# Patient Record
Sex: FEMALE | Race: ASIAN | NOT HISPANIC OR LATINO | Employment: UNEMPLOYED | URBAN - METROPOLITAN AREA
[De-identification: names, ages, dates, MRNs, and addresses within clinical notes are randomized per-mention and may not be internally consistent; named-entity substitution may affect disease eponyms.]

---

## 2017-01-18 ENCOUNTER — OFFICE VISIT (OUTPATIENT)
Dept: FAMILY MEDICINE | Facility: CLINIC | Age: 62
End: 2017-01-18

## 2017-01-18 VITALS
BODY MASS INDEX: 34.42 KG/M2 | DIASTOLIC BLOOD PRESSURE: 72 MMHG | RESPIRATION RATE: 16 BRPM | WEIGHT: 153 LBS | HEART RATE: 70 BPM | HEIGHT: 56 IN | TEMPERATURE: 97.6 F | OXYGEN SATURATION: 98 % | SYSTOLIC BLOOD PRESSURE: 128 MMHG

## 2017-01-18 DIAGNOSIS — E11.42 TYPE 2 DIABETES MELLITUS WITH DIABETIC POLYNEUROPATHY, WITH LONG-TERM CURRENT USE OF INSULIN (H): Primary | ICD-10-CM

## 2017-01-18 DIAGNOSIS — Z79.4 TYPE 2 DIABETES MELLITUS WITH DIABETIC POLYNEUROPATHY, WITH LONG-TERM CURRENT USE OF INSULIN (H): Primary | ICD-10-CM

## 2017-01-18 LAB
CREAT UR-MCNC: 52.4 MG/DL
MICROALBUMIN UR-MCNC: <0.5 MG/DL (ref 0–1.99)
MICROALBUMIN/CREAT UR: NORMAL MG/G

## 2017-01-18 NOTE — NURSING NOTE
name: Naty Santos  Language: Hmong  Agency: [facility]  Phone number: [phone]

## 2017-01-19 LAB
BUN SERPL-MCNC: 52 MG/DL (ref 7–30)
CALCIUM SERPL-MCNC: 9.3 MG/DL (ref 8.5–10.4)
CHLORIDE SERPLBLD-SCNC: 109 MMOL/L (ref 94–109)
CO2 SERPL-SCNC: 23 MMOL/L (ref 20–32)
CREAT SERPL-MCNC: 1.7 MG/DL (ref 0.6–1.3)
EGFR CALCULATED (BLACK REFERENCE): 39.3 ML/MIN
EGFR CALCULATED (NON BLACK REFERENCE): 32.5 ML/MIN
GLUCOSE SERPL-MCNC: 147 MG/DL (ref 60–109)
POTASSIUM SERPL-SCNC: 4.7 MMOL/L (ref 3.4–5.3)
SODIUM SERPL-SCNC: 140 MMOL/L (ref 133–144)

## 2017-01-19 NOTE — PROGRESS NOTES
Quick Note:    Call patient:    Kidney test does show that the new pill (invokana) is causing some decline in kidney function. I don't like this kidney response and therefore want you to stop taking the invokana pill. When you are no longer using that pill, you will need to go back up on your insulin: I think you could return to 70 units in AM and 50 units in PM. We will recheck your kidney and A1c blood work the last week of February.  ______

## 2017-01-20 NOTE — PROGRESS NOTES
Quick Note:    Called pt with results. Informed not to take the Invokana and to go back on insulin as mentioned in message from provider.   ---SANDY Balderas.     ______

## 2017-03-08 ENCOUNTER — OFFICE VISIT (OUTPATIENT)
Dept: FAMILY MEDICINE | Facility: CLINIC | Age: 62
End: 2017-03-08

## 2017-03-08 VITALS
RESPIRATION RATE: 16 BRPM | HEIGHT: 56 IN | BODY MASS INDEX: 34.42 KG/M2 | DIASTOLIC BLOOD PRESSURE: 68 MMHG | HEART RATE: 77 BPM | OXYGEN SATURATION: 100 % | SYSTOLIC BLOOD PRESSURE: 120 MMHG | TEMPERATURE: 97.4 F | WEIGHT: 153 LBS

## 2017-03-08 DIAGNOSIS — Z11.59 NEED FOR HEPATITIS C SCREENING TEST: ICD-10-CM

## 2017-03-08 DIAGNOSIS — Z79.4 TYPE 2 DIABETES MELLITUS WITH DIABETIC POLYNEUROPATHY, WITH LONG-TERM CURRENT USE OF INSULIN (H): Primary | ICD-10-CM

## 2017-03-08 DIAGNOSIS — E11.42 TYPE 2 DIABETES MELLITUS WITH DIABETIC POLYNEUROPATHY, WITH LONG-TERM CURRENT USE OF INSULIN (H): Primary | ICD-10-CM

## 2017-03-08 DIAGNOSIS — Z12.39 BREAST CANCER SCREENING: ICD-10-CM

## 2017-03-08 DIAGNOSIS — Z12.11 COLON CANCER SCREENING: ICD-10-CM

## 2017-03-08 LAB
ANION GAP SERPL CALCULATED.3IONS-SCNC: 9 MMOL/L (ref 5–18)
BUN SERPL-MCNC: 35 MG/DL (ref 8–22)
CALCIUM SERPL-MCNC: 9.8 MG/DL (ref 8.5–10.5)
CHLORIDE SERPL-SCNC: 109 MMOL/L (ref 98–107)
CO2 SERPL-SCNC: 21 MMOL/L (ref 22–31)
CREAT SERPL-MCNC: 1.19 MG/DL (ref 0.6–1.1)
GLUCOSE SERPL-MCNC: 76 MG/DL (ref 70–125)
HBA1C MFR BLD: 9.4 % (ref 4.1–5.7)
POTASSIUM SERPL-SCNC: 4.3 MMOL/L (ref 3.5–5)
SODIUM SERPL-SCNC: 139 MMOL/L (ref 136–145)

## 2017-03-08 RX ORDER — GLIPIZIDE 5 MG/1
15 TABLET ORAL
Qty: 270 TABLET | Refills: 3 | Status: SHIPPED | OUTPATIENT
Start: 2017-03-08 | End: 2017-07-19

## 2017-03-08 NOTE — PROGRESS NOTES
SUBJECTIVE:  Omid Adorno is a 60 year old who presents today for follow up of DIABETES MELLITUS     1.  Diabetes:  Victoza daily, lantus 70 AM 54 PM, and glipizide 10 in the AM,  Blood glucose averages:132 in AM, 107 and 191 in evening  Symptoms of low blood sugar (hypoglycemia:sweating, shaky, weak, dizzy, blurred vision, confusion)? Denies    Problems taking medications regularly? none  Side effects? None    Health maintenance reviewed and appropriate orders placed?  Yes      BP Readings from Last 3 Encounters:   03/08/17 120/68   01/18/17 128/72   11/30/16 138/75     Lab Results   Component Value Date    A1C 9.4 03/08/2017    A1C 10.3 11/30/2016    A1C 9.2 09/13/2016    A1C 9.4 06/01/2016    A1C 10.8 03/18/2016           Recent Labs   Lab Test  05/08/15   0847  04/28/14   0829   CHOL  119.0  147.0   HDL  40.0*  47.0*   LDL  62  65.0  82.0   TRIG  74.0  88.0   CHOLHDLRATIO  3.0  3.1       Wt Readings from Last 3 Encounters:   03/08/17 153 lb (69.4 kg)   01/18/17 153 lb (69.4 kg)   11/30/16 154 lb 12.8 oz (70.2 kg)       Current Outpatient Prescriptions   Medication Sig Dispense Refill     insulin glargine (LANTUS) 100 UNIT/ML injection 64 units SQ in AM 50 units SQ in PM 30 mL 11     aspirin 81 MG tablet Take 1 tablet (81 mg) by mouth daily 90 tablet 3     atorvastatin (LIPITOR) 40 MG tablet Take 1 tablet (40 mg) by mouth daily 90 tablet 3     glipiZIDE (GLUCOTROL) 10 MG tablet Take 1 tablet (10 mg) by mouth daily (before lunch) 90 tablet 3     liraglutide (VICTOZA) 18 MG/3ML soln Inject 1.8 mg Subcutaneous daily 3 Month 1     lisinopril-hydrochlorothiazide (PRINZIDE,ZESTORETIC) 20-12.5 MG per tablet Take 2 tablets by mouth daily 180 tablet 3     canagliflozin (INVOKANA) 100 MG tablet Take 1 tablet (100 mg) by mouth every morning (before breakfast) 30 tablet 3     latanoprost (XALATAN) 0.005 % ophthalmic solution Apply 1 drop to eye daily Instill  1 drop into the affected eye(s) once daily as directed 1 Bottle 3  "    acetaminophen (TYLENOL) 325 MG tablet Take 1-2 tablets (325-650 mg) by mouth every 4 hours as needed for mild pain 100 tablet 3     ammonium lactate (AMLACTIN) 12 % cream Apply to feet twice daily 420 g 4     blood glucose monitoring (KEYSHA CONTOUR) test strip Check sugars 2 times daily 200 each 3     blood glucose monitoring (KEYSHA MICROLET) lancets Check sugars twice daily 200 Box 3     capsaicin (ZOSTRIX) 0.025 % CREA Apply twice daily to affected skin 180 g 10     cholecalciferol (VITAMIN D) 1000 UNIT tablet Take 1 tablet (1,000 Units) by mouth daily 100 tablet 3     insulin pen needle 31G X 8 MM Use for insulin injections twice daily 200 each 3     loratadine (CLARITIN) 10 MG tablet Take 1 tablet (10 mg) by mouth daily as needed for allergies 30 tablet 10     Alcohol Swabs PADS Use as directed twice daily 100 each 1     Blood Glucose Monitoring Suppl ([employer] CONTOUR MONITOR) W/DEVICE KIT Check fasting glucose 2 day on insulin 1 kit 3       Histories reviewed and updated in Epic.      ROS:  C: NEGATIVE for fatigue, unexpected change in weight  R: NEGATIVE for significant cough or shortness of breath  CV: NEGATIVE for chest pain, palpitations or new or worsening peripheral edema  HEME/ALLERGY/IMMUNE: no known seasonal allergies  P: NEGATIVE for changes in mood or affect    EXAM:  Vitals: /68  Pulse 77  Temp 97.4  F (36.3  C) (Oral)  Resp 16  Ht 4' 7.5\" (141 cm)  Wt 153 lb (69.4 kg)  SpO2 100%  BMI 34.92 kg/m2  BMIE= Body mass index is 34.92 kg/(m^2).  GENERAL APPEARANCE: alert and no acute distress  RESP: lungs clear to auscultation - no rales, rhonchi or wheezes  CV: regular rate and rhythm, normal S1 S2, no S3 or S4 and no murmur, click or rub -    ASSESSMENT AND PLAN:  (E11.42,  Z79.4) Type 2 diabetes mellitus with diabetic polyneuropathy, with long-term current use of insulin (H)  (primary encounter diagnosis)  Comment: improving again  Plan: Hemoglobin A1c ([facility]), glipiZIDE (GLUCOTROL)         " 5 MG tablet, Basic Metabolic Profile         ([employer]), CANCELED: Basic Metabolic Panel         (LabDAQ)        Only change will be with glipizide to now take 1.5 tabs daily with supper meal    (Z11.59) Need for hepatitis C screening test  Comment: agrees, low risk  Plan: Hepatitis C Antibody ([facility])            (Z12.11) Colon cancer screening  Comment: agrees, asymptomatic  Plan: Fecal Occult Blood - FIT, iFOB ([facility])            (Z12.39) Breast cancer screening  Comment: agrees  Plan: MA SCREENING DIGITAL BILAT

## 2017-03-08 NOTE — MR AVS SNAPSHOT
After Visit Summary   3/8/2017    Omid Adorno    MRN: [id number]           Patient Information     Date Of Birth          1955        Visit Information        Provider Department      3/8/2017 4:20 PM Shannan Benson MD [facility]        Today's Diagnoses     Type 2 diabetes mellitus with diabetic polyneuropathy, with long-term current use of insulin (H)    -  1    Need for hepatitis C screening test        Colon cancer screening        Breast cancer screening          Care Instructions    What: Mammogram   Where: [facility]   When:  4:30pm Thursday March 16th 2017  Telephone: [phone]   Fax: [phone]  Any additional comments: No soaps, powders, deodorants, or scented items.  Please arrive at least 10-15 minutes early.   Scheduled by: Detsin José CMA    Increase Glipizide  ~A new script sent to pharmacy for the Glipizide 5mg (take 3 daily to equal a total of 15mg daily).  If you want to use up the Glipizide you have now, you can take 1 and half tablet (Total of 15mg)    ~Follow up 3 months    Your medication list is printed, please keep this with you, it is helpful to bring this current list to any other medical appointments, the emergency room or hospital.    If you had lab testing today and your results are reassuring or normal they will be be mailed to you within 7 days.     If the lab tests need quick action we will call you with the results.   The phone number we will call with results is # [phone] (home) . If this is not the best number please call our clinic and change the number.    If you need any refills please call your pharmacy and they will contact us.    If you have any further concerns or wish to schedule another appointment you must call our office during normal business hours  [phone] (8-5:00 M-F)  If you have urgent medical questions that cannot wait  you may also call [phone] at any time of day.  If you have a  "medical emergency please call 911.    Thank you for coming to [facility].          Follow-ups after your visit        Future tests that were ordered for you today     Open Future Orders        Priority Expected Expires Ordered    Fecal Occult Blood - FIT, iFOB ([facility]) Routine  12/15/2017 3/8/2017            Who to contact     Please call your clinic at [phone] to:    Ask questions about your health    Make or cancel appointments    Discuss your medicines    Learn about your test results    Speak to your doctor   If you have compliments or concerns about an experience at your clinic, or if you wish to file a complaint, please contact [facility] Patient Relations at [phone] or email us at [email or web address]         Additional Information About Your Visit        Care EveryWhere ID     This is your Care EveryWhere ID. This could be used by other organizations to access your [place] medical records  [id number]        Your Vitals Were     Pulse Temperature Respirations Height Pulse Oximetry BMI (Body Mass Index)    77 97.4  F (36.3  C) (Oral) 16 4' 7.5\" (141 cm) 100% 34.92 kg/m2       Blood Pressure from Last 3 Encounters:   03/08/17 120/68   01/18/17 128/72   11/30/16 138/75    Weight from Last 3 Encounters:   03/08/17 153 lb (69.4 kg)   01/18/17 153 lb (69.4 kg)   11/30/16 154 lb 12.8 oz (70.2 kg)              We Performed the Following     Basic Metabolic Panel ([facility])     Hemoglobin A1c ([facility])     Hepatitis C Antibody ([facility])     MA SCREENING DIGITAL BILAT          Today's Medication Changes          These changes are accurate as of: 3/8/17  5:15 PM.  If you have any questions, ask your nurse or doctor.               These medicines have changed or have updated prescriptions.        Dose/Directions    glipiZIDE 5 MG tablet   Commonly known as:  GLUCOTROL   This may have changed:    - medication strength  - how much to take   Used for:  Type 2 " diabetes mellitus with diabetic polyneuropathy, with long-term current use of insulin (H)   Changed by:  Shannan Benson MD        Dose:  15 mg   Take 3 tablets (15 mg) by mouth daily (before lunch)   Quantity:  270 tablet   Refills:  3         Stop taking these medicines if you haven't already. Please contact your care team if you have questions.     canagliflozin 100 MG tablet   Commonly known as:  INVOKANA   Stopped by:  Shannan Benson MD                Where to get your medicines      These medications were sent to [facility]pharm[facility] #[id number] - [place], [place] - [address]  [address], [place] [address]    Hours:  24-hours Phone:  [phone]     glipiZIDE 5 MG tablet                Primary Care Provider Office Phone # Fax #    Shannan Benson [phone]       [facility] [address] [facility] [address]        Thank you!     Thank you for choosing [facility]  for your care. Our goal is always to provide you with excellent care. Hearing back from our patients is one way we can continue to improve our services. Please take a few minutes to complete the written survey that you may receive in the mail after your visit with us. Thank you!             Your Updated Medication List - Protect others around you: Learn how to safely use, store and throw away your medicines at www.disposemymeds.org.          This list is accurate as of: 3/8/17  5:15 PM.  Always use your most recent med list.                   Brand Name Dispense Instructions for use    acetaminophen 325 MG tablet    TYLENOL    100 tablet    Take 1-2 tablets (325-650 mg) by mouth every 4 hours as needed for mild pain       Alcohol Swabs Pads     100 each    Use as directed twice daily       ammonium lactate 12 % cream    AMLACTIN    420 g    Apply to feet twice daily       aspirin 81 MG tablet     90 tablet    Take 1 tablet (81 mg) by mouth daily       atorvastatin 40 MG tablet     LIPITOR    90 tablet    Take 1 tablet (40 mg) by mouth daily       blood glucose monitoring lancets     200 Box    Check sugars twice daily       blood glucose monitoring meter device kit     1 kit    Check fasting glucose 2 day on insulin       blood glucose monitoring test strip    KEYSHA CONTOUR    200 each    Check sugars 2 times daily       capsaicin 0.025 % Crea cream    ZOSTRIX    180 g    Apply twice daily to affected skin       cholecalciferol 1000 UNIT tablet    vitamin D    100 tablet    Take 1 tablet (1,000 Units) by mouth daily       glipiZIDE 5 MG tablet    GLUCOTROL    270 tablet    Take 3 tablets (15 mg) by mouth daily (before lunch)       insulin glargine 100 UNIT/ML injection    LANTUS    30 mL    70 units SQ in AM 50 units SQ in PM       insulin pen needle 31G X 8 MM     200 each    Use for insulin injections twice daily       latanoprost 0.005 % ophthalmic solution    XALATAN    1 Bottle    Apply 1 drop to eye daily Instill  1 drop into the affected eye(s) once daily as directed       liraglutide 18 MG/3ML soln    VICTOZA    3 Month    Inject 1.8 mg Subcutaneous daily       lisinopril-hydrochlorothiazide 20-12.5 MG per tablet    PRINZIDE/ZESTORETIC    180 tablet    Take 2 tablets by mouth daily       loratadine 10 MG tablet    CLARITIN    30 tablet    Take 1 tablet (10 mg) by mouth daily as needed for allergies

## 2017-03-08 NOTE — PATIENT INSTRUCTIONS
What: Mammogram   Where: [facility]   When:  4:30pm Thursday March 16th 2017  Telephone: [phone]   Fax: [phone]  Any additional comments: No soaps, powders, deodorants, or scented items.  Please arrive at least 10-15 minutes early.   Scheduled by: Destin José CMA    Increase Glipizide  ~A new script sent to pharmacy for the Glipizide 5mg (take 3 daily to equal a total of 15mg daily).  If you want to use up the Glipizide you have now, you can take 1 and half tablet (Total of 15mg)    ~Follow up 3 months    Your medication list is printed, please keep this with you, it is helpful to bring this current list to any other medical appointments, the emergency room or hospital.    If you had lab testing today and your results are reassuring or normal they will be be mailed to you within 7 days.     If the lab tests need quick action we will call you with the results.   The phone number we will call with results is # [phone] (home) . If this is not the best number please call our clinic and change the number.    If you need any refills please call your pharmacy and they will contact us.    If you have any further concerns or wish to schedule another appointment you must call our office during normal business hours  [phone] (8-5:00 M-F)  If you have urgent medical questions that cannot wait  you may also call [phone] at any time of day.  If you have a medical emergency please call [phone].    Thank you for coming to [facility].

## 2017-03-09 LAB — HCV AB SER QL: NEGATIVE

## 2017-03-09 ASSESSMENT — PATIENT HEALTH QUESTIONNAIRE - PHQ9: SUM OF ALL RESPONSES TO PHQ QUESTIONS 1-9: 9

## 2017-03-09 NOTE — PROGRESS NOTES
Call patient:    Kidneys better since stopping invokanna medicine.  We will see you back in mid May for recheck.  Only medicine change is glipizide now 15 mg daily

## 2017-03-14 DIAGNOSIS — E11.42 TYPE 2 DIABETES MELLITUS WITH DIABETIC POLYNEUROPATHY, WITH LONG-TERM CURRENT USE OF INSULIN (H): Primary | ICD-10-CM

## 2017-03-14 DIAGNOSIS — H40.9 GLAUCOMA, UNSPECIFIED GLAUCOMA, UNSPECIFIED LATERALITY: ICD-10-CM

## 2017-03-14 DIAGNOSIS — Z79.4 TYPE 2 DIABETES MELLITUS WITH DIABETIC POLYNEUROPATHY, WITH LONG-TERM CURRENT USE OF INSULIN (H): Primary | ICD-10-CM

## 2017-03-14 RX ORDER — LATANOPROST 50 UG/ML
1 SOLUTION/ DROPS OPHTHALMIC DAILY
Qty: 2.5 ML | Refills: 6 | Status: SHIPPED | OUTPATIENT
Start: 2017-03-14 | End: 2017-10-26

## 2017-03-14 RX ORDER — LIRAGLUTIDE 6 MG/ML
1.8 INJECTION SUBCUTANEOUS DAILY
Qty: 27 ML | Refills: 3 | Status: SHIPPED | OUTPATIENT
Start: 2017-03-14 | End: 2017-10-25

## 2017-03-16 ENCOUNTER — HOSPITAL ENCOUNTER (OUTPATIENT)
Dept: MAMMOGRAPHY | Facility: HOSPITAL | Age: 62
Discharge: HOME | End: 2017-03-16
Attending: FAMILY MEDICINE

## 2017-03-16 DIAGNOSIS — Z12.31 VISIT FOR SCREENING MAMMOGRAM: ICD-10-CM

## 2017-03-16 LAB — MAMMOGRAM: NORMAL

## 2017-05-03 DIAGNOSIS — L84 CALLUS OF FOOT: ICD-10-CM

## 2017-05-03 RX ORDER — AMMONIUM LACTATE 12 G/100G
CREAM TOPICAL
Qty: 420 G | Refills: 3 | Status: SHIPPED | OUTPATIENT
Start: 2017-05-03 | End: 2017-12-20

## 2017-05-09 ENCOUNTER — TRANSFERRED RECORDS (OUTPATIENT)
Dept: HEALTH INFORMATION MANAGEMENT | Facility: CLINIC | Age: 62
End: 2017-05-09

## 2017-05-09 ENCOUNTER — OFFICE VISIT (OUTPATIENT)
Dept: FAMILY MEDICINE | Facility: CLINIC | Age: 62
End: 2017-05-09

## 2017-05-09 VITALS
BODY MASS INDEX: 35.68 KG/M2 | HEIGHT: 55 IN | SYSTOLIC BLOOD PRESSURE: 130 MMHG | HEART RATE: 69 BPM | DIASTOLIC BLOOD PRESSURE: 79 MMHG | TEMPERATURE: 97.7 F | WEIGHT: 154.2 LBS | OXYGEN SATURATION: 99 %

## 2017-05-09 DIAGNOSIS — M72.2 PLANTAR FASCIITIS: ICD-10-CM

## 2017-05-09 DIAGNOSIS — M79.671 RIGHT FOOT PAIN: Primary | ICD-10-CM

## 2017-05-09 NOTE — PATIENT INSTRUCTIONS
Your medication list is printed, please keep this with you, it is helpful to bring this current list to any other medical appointments, the emergency room or hospital.    If you had lab testing today and your results are reassuring or normal they will be be mailed to you within 7 days.     If the lab tests need quick action we will call you with the results.   The phone number we will call with results is # [phone] (home) . If this is not the best number please call our clinic and change the number.    If you need any refills please call your pharmacy and they will contact us.    If you have any further concerns or wish to schedule another appointment you must call our office during normal business hours  [phone] (8-5:00 M-F)  If you have urgent medical questions that cannot wait  you may also call [phone] at any time of day.  If you have a medical emergency please call [phone].    Thank you for coming to [facility].    Referral for Physical Therapy faxed to [facility] and they will contact pt to schedule appointment.

## 2017-05-09 NOTE — PROGRESS NOTES
"SUBJECTIVE:  1. Right heel pain   -since nails in sole of feet 9/2016 one area seemed to heal well, another area seemed to always be tender, now gotten worse  -pain around the heel with weight bearing, no pain when just sitting/laying  -denies other trauma and has not tried anything for pain    ROS: denies fevers/chills/skin changes    ED visit and initial injury records reviewed    OBJECTIVE:  /79 (BP Location: Right arm, Patient Position: Chair, Cuff Size: Adult Large)  Pulse 69  Temp 97.7  F (36.5  C) (Oral)  Ht 4' 7.25\" (140.3 cm)  Wt 154 lb 3.2 oz (69.9 kg)  SpO2 99%  BMI 35.52 kg/m2  Gen: alert, oriented X 3, no acute distress, no sign of discomfort  HEEL: relatively non-tender over lateral heel, mild tender over plantar insertion, no obvious skin changes/lumps suggesting retained fb    XRAY: negative for retained metallic FB, no spur, nl bony structures    ASSESSMENT/PLAN:  (M79.671) Right foot pain  (primary encounter diagnosis)  Comment: evaluate for any FB from retained nail  Plan: XR FOOT RT G/E 3 VW        Reassured about puncture    (M72.2) Plantar fasciitis  Comment: exam and story consistent  Plan: PHYSICAL THERAPY REFERRAL        Discussed use of ibuprofen/tylenol/ice massage and PT      "

## 2017-05-09 NOTE — MR AVS SNAPSHOT
After Visit Summary   5/9/2017    Omid Adorno    MRN: [id number]           Patient Information     Date Of Birth          1955        Visit Information        Provider Department      5/9/2017 4:20 PM Shannan eBnson MD [facility]        Today's Diagnoses     Right foot pain    -  1    Plantar fasciitis          Care Instructions          Your medication list is printed, please keep this with you, it is helpful to bring this current list to any other medical appointments, the emergency room or hospital.    If you had lab testing today and your results are reassuring or normal they will be be mailed to you within 7 days.     If the lab tests need quick action we will call you with the results.   The phone number we will call with results is # [phone] (home) . If this is not the best number please call our clinic and change the number.    If you need any refills please call your pharmacy and they will contact us.    If you have any further concerns or wish to schedule another appointment you must call our office during normal business hours  [phone] (8-5:00 M-F)  If you have urgent medical questions that cannot wait  you may also call [phone] at any time of day.  If you have a medical emergency please call [phone].    Thank you for coming to [facility].    Referral for Physical Therapy faxed to [facility] and they will contact pt to schedule appointment.         Follow-ups after your visit        Additional Services     PHYSICAL THERAPY REFERRAL       PT/OT  Facility:   [facility], P: [phone], F: [phone]    History: right heel pain, has been mild pain and acute flare    Precautions/Contraindications: None    Imaging Studies: X-Ray    Surgical Procedure/Test Results: None    Treatment Goals:   Decrease: Pain    Prognosis: good    Visits: Up to 12    Evaluate: Evaluate and treat    Plan: Manual Therapy                 "  Follow-up notes from your care team     Return in about 4 weeks (around 2017).      Who to contact     Please call your clinic at [phone] to:    Ask questions about your health    Make or cancel appointments    Discuss your medicines    Learn about your test results    Speak to your doctor   If you have compliments or concerns about an experience at your clinic, or if you wish to file a complaint, please contact [facility] Patient Relations at [phone] or email us at [email or web address]         Additional Information About Your Visit        [employer] Information     [employer] is an electronic gateway that provides easy, online access to your medical records. With [employer], you can request a clinic appointment, read your test results, renew a prescription or communicate with your care team.     To sign up for [employer] visit the website at [email or web address]   You will be asked to enter the access code listed below, as well as some personal information. Please follow the directions to create your username and password.     Your access code is: [email or web address]  Expires: 2017 11:19 AM     Your access code will  in 90 days. If you need help or a new code, please contact your [facility] or call [phone] for assistance.        Care EveryWhere ID     This is your Care EveryWhere ID. This could be used by other organizations to access your [place] medical records  [id number]        Your Vitals Were     Pulse Temperature Height Pulse Oximetry BMI (Body Mass Index)       69 97.7  F (36.5  C) (Oral) 4' 7.25\" (140.3 cm) 99% 35.52 kg/m2        Blood Pressure from Last 3 Encounters:   17 130/79   17 120/68   17 128/72    Weight from Last 3 Encounters:   17 154 lb 3.2 oz (69.9 kg)   17 153 lb (69.4 kg)   17 153 lb (69.4 kg)              We Performed the Following     XR FOOT RT G/E 3 VW        " Primary Care Provider Office Phone # Fax #    Shannan Patrick Benson [phone]       [facility] [address] [place] [address]        Thank you!     Thank you for choosing [facility]  for your care. Our goal is always to provide you with excellent care. Hearing back from our patients is one way we can continue to improve our services. Please take a few minutes to complete the written survey that you may receive in the mail after your visit with us. Thank you!             Your Updated Medication List - Protect others around you: Learn how to safely use, store and throw away your medicines at www.disposemymeds.org.          This list is accurate as of: 5/9/17 11:59 PM.  Always use your most recent med list.                   Brand Name Dispense Instructions for use    acetaminophen 325 MG tablet    TYLENOL    100 tablet    Take 1-2 tablets (325-650 mg) by mouth every 4 hours as needed for mild pain       Alcohol Swabs Pads     100 each    Use as directed twice daily       ammonium lactate 12 % cream    AMLACTIN    420 g    Apply to feet twice daily       aspirin 81 MG tablet     90 tablet    Take 1 tablet (81 mg) by mouth daily       atorvastatin 40 MG tablet    LIPITOR    90 tablet    Take 1 tablet (40 mg) by mouth daily       blood glucose monitoring lancets     200 Box    Check sugars twice daily       blood glucose monitoring meter device kit     1 kit    Check fasting glucose 2 day on insulin       blood glucose monitoring test strip    KEYSHA CONTOUR    200 each    Check sugars 2 times daily       capsaicin 0.025 % Crea cream    ZOSTRIX    180 g    Apply twice daily to affected skin       cholecalciferol 1000 UNIT tablet    vitamin D    100 tablet    Take 1 tablet (1,000 Units) by mouth daily       glipiZIDE 5 MG tablet    GLUCOTROL    270 tablet    Take 3 tablets (15 mg) by mouth daily (before lunch)       insulin glargine 100 UNIT/ML injection    LANTUS    30 mL    70  units SQ in AM 50 units SQ in PM       insulin pen needle 31G X 8 MM     200 each    Use for insulin injections twice daily       latanoprost 0.005 % ophthalmic solution    XALATAN    2.5 mL    Apply 1 drop to eye daily Instill  1 drop into the affected eye(s) once daily as directed       liraglutide 18 MG/3ML soln    VICTOZA    27 mL    Inject 1.8 mg Subcutaneous daily       lisinopril-hydrochlorothiazide 20-12.5 MG per tablet    PRINZIDE/ZESTORETIC    180 tablet    Take 2 tablets by mouth daily       loratadine 10 MG tablet    CLARITIN    30 tablet    Take 1 tablet (10 mg) by mouth daily as needed for allergies

## 2017-05-10 ENCOUNTER — AMBULATORY - HEALTHEAST (OUTPATIENT)
Dept: ADMINISTRATIVE | Facility: REHABILITATION | Age: 62
End: 2017-05-10

## 2017-05-10 DIAGNOSIS — M72.2 PLANTAR FASCIITIS: ICD-10-CM

## 2017-05-18 ENCOUNTER — OFFICE VISIT - HEALTHEAST (OUTPATIENT)
Dept: PHYSICAL THERAPY | Facility: REHABILITATION | Age: 62
End: 2017-05-18

## 2017-05-18 DIAGNOSIS — M72.2 PLANTAR FASCIITIS: ICD-10-CM

## 2017-05-18 DIAGNOSIS — M79.671 RIGHT FOOT PAIN: ICD-10-CM

## 2017-05-18 DIAGNOSIS — M25.673 DECREASED ROM OF ANKLE: ICD-10-CM

## 2017-05-25 ENCOUNTER — OFFICE VISIT - HEALTHEAST (OUTPATIENT)
Dept: PHYSICAL THERAPY | Facility: REHABILITATION | Age: 62
End: 2017-05-25

## 2017-05-25 DIAGNOSIS — M25.673 DECREASED ROM OF ANKLE: ICD-10-CM

## 2017-05-25 DIAGNOSIS — M72.2 PLANTAR FASCIITIS: ICD-10-CM

## 2017-05-25 DIAGNOSIS — M79.671 RIGHT FOOT PAIN: ICD-10-CM

## 2017-06-01 ENCOUNTER — OFFICE VISIT - HEALTHEAST (OUTPATIENT)
Dept: PHYSICAL THERAPY | Facility: REHABILITATION | Age: 62
End: 2017-06-01

## 2017-06-01 DIAGNOSIS — M79.671 RIGHT FOOT PAIN: ICD-10-CM

## 2017-06-01 DIAGNOSIS — M25.673 DECREASED ROM OF ANKLE: ICD-10-CM

## 2017-06-01 DIAGNOSIS — M72.2 PLANTAR FASCIITIS: ICD-10-CM

## 2017-06-08 ENCOUNTER — OFFICE VISIT - HEALTHEAST (OUTPATIENT)
Dept: PHYSICAL THERAPY | Facility: REHABILITATION | Age: 62
End: 2017-06-08

## 2017-06-08 DIAGNOSIS — M79.671 RIGHT FOOT PAIN: ICD-10-CM

## 2017-06-08 DIAGNOSIS — M25.673 DECREASED ROM OF ANKLE: ICD-10-CM

## 2017-06-08 DIAGNOSIS — M72.2 PLANTAR FASCIITIS: ICD-10-CM

## 2017-06-12 DIAGNOSIS — E11.43 TYPE 2 DIABETES MELLITUS WITH DIABETIC AUTONOMIC NEUROPATHY, WITH LONG-TERM CURRENT USE OF INSULIN (H): ICD-10-CM

## 2017-06-12 DIAGNOSIS — Z79.4 TYPE 2 DIABETES MELLITUS WITH DIABETIC AUTONOMIC NEUROPATHY, WITH LONG-TERM CURRENT USE OF INSULIN (H): ICD-10-CM

## 2017-06-12 NOTE — TELEPHONE ENCOUNTER
[facility] phone call message- medication clarification/question:    Full Medication Name: Insulin glargin (lantus) 100 unit/mL injection   Strength:     Have you contacted your pharmacy about this refill request? Yes    If  Yes,  which pharmacy? [facility]    When did you contact the pharmacy? Just right before she called the clinic    Additional comments/concerns from call to pharmacy: The Pharmacy said they will send a fax to the clinic but told her to contact the clinic as well since she will be out.    Reason for call to clinic: Patient is calling for refills, she states she forgot to check and now she is going to run out so she would need lantus above refill tonight or tomorrow because for tomorrow she will not have any insulin. Told her that the clinic would contact her or the pharmacy will to let her know about her Rx if ready. Please call and advise.       Pharmacy confirmed as [id number] - [place], [place] - [address]: Yes    OK to leave a message on voice mail?     Primary language: Hmong      needed? Yes    Call taken on June 12, 2017 at 3:47 PM by Sherwin Huff

## 2017-06-13 NOTE — TELEPHONE ENCOUNTER
Called and spoke with patient and did informed patient that the Lantus still has 6 refill left and patient should call the pharmacy and patient stated that she request refill of pen needle not lantus.

## 2017-06-14 NOTE — TELEPHONE ENCOUNTER
Patient's primary number is not a working number. Called emergency contact number and left message with relative. Informed relative to have patient call clinic to schedule apt for some time in July.

## 2017-06-15 ENCOUNTER — OFFICE VISIT - HEALTHEAST (OUTPATIENT)
Dept: PHYSICAL THERAPY | Facility: REHABILITATION | Age: 62
End: 2017-06-15

## 2017-06-15 DIAGNOSIS — M79.671 RIGHT FOOT PAIN: ICD-10-CM

## 2017-06-15 DIAGNOSIS — M25.673 DECREASED ROM OF ANKLE: ICD-10-CM

## 2017-06-15 DIAGNOSIS — M72.2 PLANTAR FASCIITIS: ICD-10-CM

## 2017-07-05 ENCOUNTER — OFFICE VISIT (OUTPATIENT)
Dept: FAMILY MEDICINE | Facility: CLINIC | Age: 62
End: 2017-07-05

## 2017-07-05 VITALS
WEIGHT: 153.4 LBS | HEART RATE: 69 BPM | DIASTOLIC BLOOD PRESSURE: 65 MMHG | OXYGEN SATURATION: 100 % | BODY MASS INDEX: 35.33 KG/M2 | SYSTOLIC BLOOD PRESSURE: 109 MMHG | RESPIRATION RATE: 16 BRPM | TEMPERATURE: 97.7 F

## 2017-07-05 DIAGNOSIS — J06.9 VIRAL UPPER RESPIRATORY TRACT INFECTION: Primary | ICD-10-CM

## 2017-07-05 RX ORDER — GUAIFENESIN 200 MG/1
400 TABLET ORAL EVERY 4 HOURS PRN
Qty: 60 TABLET | Refills: 0 | Status: SHIPPED | OUTPATIENT
Start: 2017-07-05 | End: 2017-07-12

## 2017-07-05 RX ORDER — BENZONATATE 200 MG/1
200 CAPSULE ORAL 3 TIMES DAILY PRN
Qty: 42 CAPSULE | Refills: 0 | Status: SHIPPED | OUTPATIENT
Start: 2017-07-05 | End: 2022-08-04

## 2017-07-05 NOTE — PATIENT INSTRUCTIONS
For your cough:     Start Benzonatate 200 mg capsule - 1 capsule by mouth 3 times a day.    If you develop fever or short of breath call the clinic.    Your medication list is printed, please keep this with you, it is helpful to bring this current list to any other medical appointments, the emergency room or hospital.    If you had lab testing today and your results are reassuring or normal they will be be mailed to you within 7 days.     If the lab tests need quick action we will call you with the results.   The phone number we will call with results is # [phone] (home) . If this is not the best number please call our clinic and change the number.    If you need any refills please call your pharmacy and they will contact us.    If you have any further concerns or wish to schedule another appointment you must call our office during normal business hours  [phone] (8-5:00 M-F)  If you have urgent medical questions that cannot wait  you may also call [phone] at any time of day.  If you have a medical emergency please call [phone].    Thank you for coming to [facility].

## 2017-07-05 NOTE — PROGRESS NOTES
HPI:       Omid Adorno is a 61 year old  female with a significant past medical history of diabetes who presents for the new concern(s) of    1.  Cough  Patient presents with:  Cough: x 3 weeks, cough more per pt. Has not tried or taken any meds for this.  med review: pt did not bring in medication bottles.   coughing all night and now feeling a headache and sore throat  Slight mucous this morning,  Unaware of contagious contacts  -slight postnasal drip      A [employer]  was used for this visit         PMHX:     Patient Active Problem List   Diagnosis     Health Care Home     Hepatitis B carrier (H)     Chronic kidney disease, stage III (moderate)     Pure hypercholesterolemia     Essential hypertension     Obesity     Disorder of bursae and tendons in shoulder region     Vitamin D deficiency     Major depressive disorder, recurrent episode, moderate (H)     Dyspepsia     Callus of foot     Glaucoma     Other lymphedema     Rheumatoid arthritis involving both hands with positive rheumatoid factor (H)     Type 2 diabetes mellitus with diabetic polyneuropathy, with long-term current use of insulin (H)       Current Outpatient Prescriptions   Medication Sig Dispense Refill     insulin pen needle 31G X 8 MM Use for insulin injections twice daily 200 each 3     ammonium lactate (AMLACTIN) 12 % cream Apply to feet twice daily 420 g 3     liraglutide (VICTOZA) 18 MG/3ML soln Inject 1.8 mg Subcutaneous daily 27 mL 3     latanoprost (XALATAN) 0.005 % ophthalmic solution Apply 1 drop to eye daily Instill  1 drop into the affected eye(s) once daily as directed 2.5 mL 6     glipiZIDE (GLUCOTROL) 5 MG tablet Take 3 tablets (15 mg) by mouth daily (before lunch) 270 tablet 3     insulin glargine (LANTUS) 100 UNIT/ML injection 70 units SQ in AM 50 units SQ in PM 30 mL 11     acetaminophen (TYLENOL) 325 MG tablet Take 1-2 tablets (325-650 mg) by mouth every 4 hours as needed for mild pain 100 tablet 3     aspirin 81 MG  tablet Take 1 tablet (81 mg) by mouth daily 90 tablet 3     atorvastatin (LIPITOR) 40 MG tablet Take 1 tablet (40 mg) by mouth daily 90 tablet 3     blood glucose monitoring (KEYSHA CONTOUR) test strip Check sugars 2 times daily 200 each 3     blood glucose monitoring (KEYSHA MICROLET) lancets Check sugars twice daily 200 Box 3     capsaicin (ZOSTRIX) 0.025 % CREA Apply twice daily to affected skin 180 g 10     cholecalciferol (VITAMIN D) 1000 UNIT tablet Take 1 tablet (1,000 Units) by mouth daily 100 tablet 3     lisinopril-hydrochlorothiazide (PRINZIDE,ZESTORETIC) 20-12.5 MG per tablet Take 2 tablets by mouth daily 180 tablet 3     loratadine (CLARITIN) 10 MG tablet Take 1 tablet (10 mg) by mouth daily as needed for allergies 30 tablet 10     Alcohol Swabs PADS Use as directed twice daily 100 each 1     Blood Glucose Monitoring Suppl ([employer] CONTOUR MONITOR) W/DEVICE KIT Check fasting glucose 2 day on insulin 1 kit 3       Social History     Social History     Marital status:      Spouse name: N/A     Number of children: 8     Years of education: N/A     Occupational History     Homemaker      Social History Main Topics     Smoking status: Never Smoker     Smokeless tobacco: Never Used      Comment: no exposure at home per pt     Alcohol use No     Drug use: No     Sexual activity: Not on file     Other Topics Concern     Not on file     Social History Narrative    Lives with adult children        Children: May (1979), Morgan (1981), Anaid (1981), Juanjo (1983), Jewell (1984), Cristhian (1986), Jeff (1987), and Kash Adorno (1993).        No Known Allergies    No results found for this or any previous visit (from the past 24 hour(s)).         Review of Systems:   E/M: NEGATIVE for ear, mouth and throat problems  RESP:see HPI: cough  CV: NEGATIVE for chest pain, palpitations or new or worsening peripheral edema  P: NEGATIVE for changes in mood or affect          Physical Exam:     Vitals:    07/05/17 1620   BP: 109/65    BP Location: Right arm   Pulse: 69   Resp: 16   Temp: 97.7  F (36.5  C)   TempSrc: Oral   SpO2: 100%   Weight: 153 lb 6.4 oz (69.6 kg)     Body mass index is 35.33 kg/(m^2).    GENERAL APPEARANCE: healthy, alert and no distress,  EYES: Eyes grossly normal to inspection,  PERRL  HENT: ear canals and TM's normal and nose and mouth without ulcers or lesions  NECK: no adenopathy, no asymmetry, masses, or scars and thyroid normal to palpation  RESP: lungs clear to auscultation - no rales, rhonchi or wheezes  CV: regular rate and rhythm,  and no murmur, click,  rub or gallop      Assessment and Plan     (J06.9,  B97.89) Viral upper respiratory tract infection  (primary encounter diagnosis)  Comment: no signs of bacterial issue and exam normal, reassured  Plan: symptom management and requesting pill for cough so sugars don't spike with syrup  -guaifenesin or tessalon has appt next week for DM and we can f/u then.        Options for treatment and follow-up care were reviewed with the patient and/or guardian. Omid Adorno and/or guardian engaged in the decision making process and verbalized understanding of the options discussed and agreed with the final plan.    Shannan Benson MD

## 2017-07-05 NOTE — MR AVS SNAPSHOT
After Visit Summary   7/5/2017    Omid Adorno    MRN: [id number]           Patient Information     Date Of Birth          1955        Visit Information        Provider Department      7/5/2017 4:20 PM Shannan Benson MD [facility]        Today's Diagnoses     Viral upper respiratory tract infection    -  1      Care Instructions    For your cough:     Start Benzonatate 200 mg capsule - 1 capsule by mouth 3 times a day.    If you develop fever or short of breath call the clinic.    Your medication list is printed, please keep this with you, it is helpful to bring this current list to any other medical appointments, the emergency room or hospital.    If you had lab testing today and your results are reassuring or normal they will be be mailed to you within 7 days.     If the lab tests need quick action we will call you with the results.   The phone number we will call with results is # [phone] (home) . If this is not the best number please call our clinic and change the number.    If you need any refills please call your pharmacy and they will contact us.    If you have any further concerns or wish to schedule another appointment you must call our office during normal business hours  [phone] (8-5:00 M-F)  If you have urgent medical questions that cannot wait  you may also call [phone] at any time of day.  If you have a medical emergency please call [phone].    Thank you for coming to [facility].            Follow-ups after your visit        Your next 10 appointments already scheduled     Jul 11, 2017  4:20 PM CDT   Return Visit with Shannan Benson MD   [facility] ([facility])    [address] [place] [id number]   [phone]              Who to contact     Please call your clinic at [phone] to:    Ask questions about your health    Make or cancel appointments    Discuss your medicines    Learn about your test results    Speak  to your doctor   If you have compliments or concerns about an experience at your clinic, or if you wish to file a complaint, please contact [facility] Patient Relations at [phone] or email us at [email or web address]         Additional Information About Your Visit        [employer] Information     X2IMPACT is an electronic gateway that provides easy, online access to your medical records. With X2IMPACT, you can request a clinic appointment, read your test results, renew a prescription or communicate with your care team.     To sign up for X2IMPACT visit the website at [email or web address]   You will be asked to enter the access code listed below, as well as some personal information. Please follow the directions to create your username and password.     Your access code is: [email or web address]  Expires: 2017 11:19 AM     Your access code will  in 90 days. If you need help or a new code, please contact your [facility] or call [phone] for assistance.        Care EveryWhere ID     This is your Care EveryWhere ID. This could be used by other organizations to access your [facility] medical records  [id number]        Your Vitals Were     Pulse Temperature Respirations Pulse Oximetry BMI (Body Mass Index)       69 97.7  F (36.5  C) (Oral) 16 100% 35.33 kg/m2        Blood Pressure from Last 3 Encounters:   17 109/65   17 130/79   17 120/68    Weight from Last 3 Encounters:   17 153 lb 6.4 oz (69.6 kg)   17 154 lb 3.2 oz (69.9 kg)   17 153 lb (69.4 kg)              Today, you had the following     No orders found for display         Today's Medication Changes          These changes are accurate as of: 17  4:42 PM.  If you have any questions, ask your nurse or doctor.               Start taking these medicines.        Dose/Directions    benzonatate 200 MG capsule   Commonly known as:  TESSALON   Used for:   Viral upper respiratory tract infection   Started by:  Shannan Benson MD        Dose:  200 mg   Take 1 capsule (200 mg) by mouth 3 times daily as needed for cough   Quantity:  42 capsule   Refills:  0            Where to get your medicines      These medications were sent to [id number] - [place], [place] - [address]  [address], [place] [address]    Hours:  24-hours Phone:  [phone]     benzonatate 200 MG capsule                Primary Care Provider Office Phone # Fax #    Shannan Benson [phone]       [facility] [address]  [facility] [address]        Equal Access to Services     KESHIA [facility] CentralBENEDICT : Hadii anselmo moore hadasho Solester, waaxda luqadaha, qaybta kaalmada adeegyada, kyle liao . So [facility] [phone].    ATENCIÓN: Si habla español, tiene a mcclure disposición servicios gratuitos de asistencia lingüística. Llame al [phone].    We comply with applicable federal civil rights laws and [place] laws. We do not discriminate on the basis of race, color, national origin, age, disability sex, sexual orientation or gender identity.            Thank you!     Thank you for choosing [facility]  for your care. Our goal is always to provide you with excellent care. Hearing back from our patients is one way we can continue to improve our services. Please take a few minutes to complete the written survey that you may receive in the mail after your visit with us. Thank you!             Your Updated Medication List - Protect others around you: Learn how to safely use, store and throw away your medicines at www.disposemymeds.org.          This list is accurate as of: 7/5/17  4:42 PM.  Always use your most recent med list.                   Brand Name Dispense Instructions for use Diagnosis    acetaminophen 325 MG tablet    TYLENOL    100 tablet    Take 1-2 tablets (325-650 mg) by mouth every 4 hours as needed for  mild pain    Rheumatoid arthritis involving both hands with positive rheumatoid factor (H)       Alcohol Swabs Pads     100 each    Use as directed twice daily    Type 2 diabetes mellitus with other diabetic ophthalmic complication (H)       ammonium lactate 12 % cream    AMLACTIN    420 g    Apply to feet twice daily    Callus of foot       aspirin 81 MG tablet     90 tablet    Take 1 tablet (81 mg) by mouth daily    Type 2 diabetes mellitus with diabetic polyneuropathy (H)       atorvastatin 40 MG tablet    LIPITOR    90 tablet    Take 1 tablet (40 mg) by mouth daily    Pure hypercholesterolemia       benzonatate 200 MG capsule    TESSALON    42 capsule    Take 1 capsule (200 mg) by mouth 3 times daily as needed for cough    Viral upper respiratory tract infection       blood glucose monitoring lancets     200 Box    Check sugars twice daily    Type 2 diabetes mellitus with diabetic polyneuropathy (H)       blood glucose monitoring meter device kit     1 kit    Check fasting glucose 2 day on insulin    Type II or unspecified type diabetes mellitus with unspecified complication, uncontrolled       blood glucose monitoring test strip    KEYSHA CONTOUR    200 each    Check sugars 2 times daily    Type 2 diabetes mellitus with diabetic polyneuropathy (H)       capsaicin 0.025 % Crea cream    ZOSTRIX    180 g    Apply twice daily to affected skin    Type 2 diabetes mellitus with diabetic polyneuropathy (H)       cholecalciferol 1000 UNIT tablet    vitamin D    100 tablet    Take 1 tablet (1,000 Units) by mouth daily    Chronic kidney disease, stage III (moderate)       glipiZIDE 5 MG tablet    GLUCOTROL    270 tablet    Take 3 tablets (15 mg) by mouth daily (before lunch)    Type 2 diabetes mellitus with diabetic polyneuropathy, with long-term current use of insulin (H)       insulin glargine 100 UNIT/ML injection    LANTUS    30 mL    70 units SQ in AM 50 units SQ in PM    Type 2 diabetes mellitus with diabetic  polyneuropathy, with long-term current use of insulin (H)       insulin pen needle 31G X 8 MM     200 each    Use for insulin injections twice daily    Type 2 diabetes mellitus with diabetic autonomic neuropathy, with long-term current use of insulin (H)       latanoprost 0.005 % ophthalmic solution    XALATAN    2.5 mL    Apply 1 drop to eye daily Instill  1 drop into the affected eye(s) once daily as directed    Glaucoma, unspecified glaucoma, unspecified laterality       liraglutide 18 MG/3ML soln    VICTOZA    27 mL    Inject 1.8 mg Subcutaneous daily    Type 2 diabetes mellitus with diabetic polyneuropathy, with long-term current use of insulin (H)       lisinopril-hydrochlorothiazide 20-12.5 MG per tablet    PRINZIDE/ZESTORETIC    180 tablet    Take 2 tablets by mouth daily    Essential hypertension with goal blood pressure less than 140/90       loratadine 10 MG tablet    CLARITIN    30 tablet    Take 1 tablet (10 mg) by mouth daily as needed for allergies    Seasonal allergic rhinitis

## 2017-07-05 NOTE — NURSING NOTE
DUE FOR:  A1C - pt coming in on 7/11/2017 for dm follow up. Wants to check at the time.  TSH (satisfy on HM not needed for patient)   Colon Cancer screening/ Fit test - remind pt to complete kit and bring back to clinic. New fit test given per pt request.  Advance Directive Planning     name: Sherwin Perales  Language: Hmong  Agency: [employer]  Phone number: [phone]

## 2017-07-06 ASSESSMENT — PATIENT HEALTH QUESTIONNAIRE - PHQ9: SUM OF ALL RESPONSES TO PHQ QUESTIONS 1-9: 9

## 2017-07-10 DIAGNOSIS — Z12.11 COLON CANCER SCREENING: ICD-10-CM

## 2017-07-10 LAB — HEMOCCULT STL QL IA: POSITIVE

## 2017-07-13 ENCOUNTER — TRANSFERRED RECORDS (OUTPATIENT)
Dept: HEALTH INFORMATION MANAGEMENT | Facility: CLINIC | Age: 62
End: 2017-07-13

## 2017-07-19 ENCOUNTER — OFFICE VISIT (OUTPATIENT)
Dept: FAMILY MEDICINE | Facility: CLINIC | Age: 62
End: 2017-07-19

## 2017-07-19 VITALS
RESPIRATION RATE: 16 BRPM | BODY MASS INDEX: 34.19 KG/M2 | TEMPERATURE: 97.4 F | SYSTOLIC BLOOD PRESSURE: 107 MMHG | HEART RATE: 71 BPM | OXYGEN SATURATION: 99 % | DIASTOLIC BLOOD PRESSURE: 70 MMHG | HEIGHT: 56 IN | WEIGHT: 152 LBS

## 2017-07-19 DIAGNOSIS — N18.30 CHRONIC KIDNEY DISEASE, STAGE III (MODERATE) (H): ICD-10-CM

## 2017-07-19 DIAGNOSIS — Z79.4 TYPE 2 DIABETES MELLITUS WITH DIABETIC POLYNEUROPATHY, WITH LONG-TERM CURRENT USE OF INSULIN (H): Primary | ICD-10-CM

## 2017-07-19 DIAGNOSIS — E87.5 HYPERKALEMIA: ICD-10-CM

## 2017-07-19 DIAGNOSIS — E11.42 TYPE 2 DIABETES MELLITUS WITH DIABETIC POLYNEUROPATHY, WITH LONG-TERM CURRENT USE OF INSULIN (H): Primary | ICD-10-CM

## 2017-07-19 DIAGNOSIS — I10 ESSENTIAL HYPERTENSION WITH GOAL BLOOD PRESSURE LESS THAN 140/90: ICD-10-CM

## 2017-07-19 LAB
ANION GAP SERPL CALCULATED.3IONS-SCNC: 10 MMOL/L (ref 5–18)
BUN SERPL-MCNC: 45 MG/DL (ref 8–22)
CALCIUM SERPL-MCNC: 9.8 MG/DL (ref 8.5–10.5)
CHLORIDE SERPL-SCNC: 111 MMOL/L (ref 98–107)
CO2 SERPL-SCNC: 19 MMOL/L (ref 22–31)
CREAT SERPL-MCNC: 1.34 MG/DL (ref 0.6–1.1)
GLUCOSE SERPL-MCNC: 68 MG/DL (ref 70–125)
HBA1C MFR BLD: 9.6 % (ref 4.1–5.7)
POTASSIUM SERPL-SCNC: 5.3 MMOL/L (ref 3.5–5)
SODIUM SERPL-SCNC: 140 MMOL/L (ref 136–145)

## 2017-07-19 RX ORDER — GLIPIZIDE 5 MG/1
20 TABLET ORAL
Qty: 360 TABLET | Refills: 3 | Status: SHIPPED | OUTPATIENT
Start: 2017-07-19 | End: 2017-12-20

## 2017-07-19 NOTE — NURSING NOTE
name: Kamila Adorno  Language: Hmong  Agency: [employer]  Phone number: [phone]      DUE FOR:  Labs due: A1C  Colonoscopy - positive feccal test. Pt declined colonoscopy.

## 2017-07-19 NOTE — PROGRESS NOTES
SUBJECTIVE:  Omid Adorno is a 60 year old who presents today for follow up of DIABETES MELLITUS     1.  Diabetes:  Victoza daily, lantus 70 AM 54 PM, and glipizide 15 in the AM,  Blood glucose averages:132 in AM, 107 and 191 in evening  Symptoms of low blood sugar (hypoglycemia:sweating, shaky, weak, dizzy, blurred vision, confusion)? Denies    Problems taking medications regularly? none  Side effects? None    Health maintenance reviewed and appropriate orders placed?  Yes      BP Readings from Last 3 Encounters:   07/19/17 107/70   07/05/17 109/65   05/09/17 130/79     Lab Results   Component Value Date    A1C 9.4 03/08/2017    A1C 10.3 11/30/2016    A1C 9.2 09/13/2016    A1C 9.4 06/01/2016    A1C 10.8 03/18/2016           Recent Labs   Lab Test  05/08/15   0847  04/28/14   0829   CHOL  119.0  147.0   HDL  40.0*  47.0*   LDL  62  65.0  82.0   TRIG  74.0  88.0   CHOLHDLRATIO  3.0  3.1       Wt Readings from Last 3 Encounters:   07/19/17 152 lb (68.9 kg)   07/05/17 153 lb 6.4 oz (69.6 kg)   05/09/17 154 lb 3.2 oz (69.9 kg)       Current Outpatient Prescriptions   Medication Sig Dispense Refill     blood glucose monitoring (KEYSHA CONTOUR MONITOR) meter device kit Check fasting glucose 2 day on insulin (okay to dispense preferred brand cover by insurance) 1 kit 3     blood glucose monitoring (KEYSHA MICROLET) lancets Check sugars twice daily (okay to dispense preferred brand cover by insurance) 200 each 3     blood glucose monitoring (KEYSHA CONTOUR) test strip Check sugars 2 times daily (okay to dispense preferred brand cover by insurance) 200 each 3     glipiZIDE (GLUCOTROL) 5 MG tablet Take 4 tablets (20 mg) by mouth daily (before lunch) 360 tablet 3     liraglutide (VICTOZA) 18 MG/3ML soln Inject 1.8 mg Subcutaneous daily 27 mL 3     insulin glargine (LANTUS) 100 UNIT/ML injection 70 units SQ in AM 50 units SQ in PM 30 mL 11     aspirin 81 MG tablet Take 1 tablet (81 mg) by mouth daily 90 tablet 3     atorvastatin  "(LIPITOR) 40 MG tablet Take 1 tablet (40 mg) by mouth daily 90 tablet 3     cholecalciferol (VITAMIN D) 1000 UNIT tablet Take 1 tablet (1,000 Units) by mouth daily 100 tablet 3     lisinopril-hydrochlorothiazide (PRINZIDE,ZESTORETIC) 20-12.5 MG per tablet Take 2 tablets by mouth daily 180 tablet 3     insulin pen needle 31G X 8 MM Use for insulin injections twice daily 200 each 3     ammonium lactate (AMLACTIN) 12 % cream Apply to feet twice daily 420 g 3     latanoprost (XALATAN) 0.005 % ophthalmic solution Apply 1 drop to eye daily Instill  1 drop into the affected eye(s) once daily as directed 2.5 mL 6     acetaminophen (TYLENOL) 325 MG tablet Take 1-2 tablets (325-650 mg) by mouth every 4 hours as needed for mild pain 100 tablet 3     capsaicin (ZOSTRIX) 0.025 % CREA Apply twice daily to affected skin 180 g 10     loratadine (CLARITIN) 10 MG tablet Take 1 tablet (10 mg) by mouth daily as needed for allergies 30 tablet 10     Alcohol Swabs PADS Use as directed twice daily 100 each 1       Histories reviewed and updated in Epic.      ROS:  C: NEGATIVE for fatigue, unexpected change in weight  R: NEGATIVE for significant cough or shortness of breath  CV: NEGATIVE for chest pain, palpitations or new or worsening peripheral edema  HEME/ALLERGY/IMMUNE: no known seasonal allergies  P: NEGATIVE for changes in mood or affect    EXAM:  Vitals: /70 (BP Location: Right arm)  Pulse 71  Temp 97.4  F (36.3  C) (Oral)  Resp 16  Ht 4' 7.5\" (141 cm)  Wt 152 lb (68.9 kg)  SpO2 99%  BMI 34.69 kg/m2  BMIE= Body mass index is 34.69 kg/(m^2).  GENERAL APPEARANCE: alert and no acute distress  RESP: lungs clear to auscultation - no rales, rhonchi or wheezes  CV: regular rate and rhythm, normal S1 S2, no S3 or S4 and no murmur, click or rub -    ASSESSMENT AND PLAN:  (E11.42,  Z79.4) Type 2 diabetes mellitus with diabetic polyneuropathy, with long-term current use of insulin (H)  (primary encounter diagnosis)  Comment: still " uncontrolled with elevated a1c and glucose levels monitored low to normal at times  Plan: Hemoglobin A1c ([facility]), blood glucose         monitoring (KEYSHA CONTOUR MONITOR) meter device        kit, blood glucose monitoring (KEYSHA MICROLET)         lancets, blood glucose monitoring (KEYSHA         CONTOUR) test strip, glipiZIDE (GLUCOTROL) 5 MG        tablet, Basic Metabolic Profile ([employer]),         CANCELED: Basic Metabolic Panel ([facility])  -         Results < 1 hr        Will increase glipizide from 15 daily to 20 mg daily all in AM      (N18.3) Chronic kidney disease, stage III (moderate)  Comment: evaluate and if elevated would consider adjusting   Plan:  Based on labs would reduce ace/hctz dose and recheck bmp in 2 weeks, elevated k and cr. With GFR now lower, push fluids.

## 2017-07-19 NOTE — MR AVS SNAPSHOT
After Visit Summary   7/19/2017    Omid Adorno    MRN: [id number]           Patient Information     Date Of Birth          1955        Visit Information        Provider Department      7/19/2017 4:20 PM Shannan Benson MD [facility]        Today's Diagnoses     Type 2 diabetes mellitus with diabetic polyneuropathy, with long-term current use of insulin (H)    -  1    Chronic kidney disease, stage III (moderate)        Essential hypertension with goal blood pressure less than 140/90        Hyperkalemia           Follow-ups after your visit        Follow-up notes from your care team     Return in about 2 weeks (around 8/2/2017) for Lab Work.      Future tests that were ordered for you today     Open Future Orders        Priority Expected Expires Ordered    Basic Metabolic Panel ([facility])  - Results < 1 hr Routine 8/3/2017 8/17/2017 7/20/2017            Who to contact     Please call your clinic at [phone] to:    Ask questions about your health    Make or cancel appointments    Discuss your medicines    Learn about your test results    Speak to your doctor   If you have compliments or concerns about an experience at your clinic, or if you wish to file a complaint, please contact [facility] Patient Relations at [phone] or email us at [email or web address]         Additional Information About Your Visit        MyChart Information     [employer] is an electronic gateway that provides easy, online access to your medical records. With [employer], you can request a clinic appointment, read your test results, renew a prescription or communicate with your care team.     To sign up for [employer] visit the website at [email or web address]   You will be asked to enter the access code listed below, as well as some personal information. Please follow the directions to create your username and password.     Your access code is: [email or web address]  Expires:  "2017 11:19 AM     Your access code will  in 90 days. If you need help or a new code, please contact your [facility] or call [phone] for assistance.        Care EveryWhere ID     This is your Care EveryWhere ID. This could be used by other organizations to access your [place] medical records  [id number]        Your Vitals Were     Pulse Temperature Respirations Height Pulse Oximetry BMI (Body Mass Index)    71 97.4  F (36.3  C) (Oral) 16 4' 7.5\" (141 cm) 99% 34.69 kg/m2       Blood Pressure from Last 3 Encounters:   17 107/70   17 109/65   17 130/79    Weight from Last 3 Encounters:   17 152 lb (68.9 kg)   17 153 lb 6.4 oz (69.6 kg)   17 154 lb 3.2 oz (69.9 kg)              We Performed the Following     Basic Metabolic Profile ([facility])     Hemoglobin A1c ([facility])          Today's Medication Changes          These changes are accurate as of: 17 11:59 PM.  If you have any questions, ask your nurse or doctor.               These medicines have changed or have updated prescriptions.        Dose/Directions    blood glucose monitoring lancets   This may have changed:  additional instructions   Used for:  Type 2 diabetes mellitus with diabetic polyneuropathy, with long-term current use of insulin (H)   Changed by:  Shannan Benson MD        Check sugars twice daily (okay to dispense preferred brand cover by insurance)   Quantity:  200 each   Refills:  3       blood glucose monitoring meter device kit   This may have changed:  additional instructions   Used for:  Type 2 diabetes mellitus with diabetic polyneuropathy, with long-term current use of insulin (H)   Changed by:  Shannan Benson MD        Check fasting glucose 2 day on insulin (okay to dispense preferred brand cover by insurance)   Quantity:  1 kit   Refills:  3       blood glucose monitoring test strip   Commonly known as:  KEYSHA CONTOUR   This may have " changed:  additional instructions   Used for:  Type 2 diabetes mellitus with diabetic polyneuropathy, with long-term current use of insulin (H)   Changed by:  Shannan Benson MD        Check sugars 2 times daily (okay to dispense preferred brand cover by insurance)   Quantity:  200 each   Refills:  3       glipiZIDE 5 MG tablet   Commonly known as:  GLUCOTROL   This may have changed:  how much to take   Used for:  Type 2 diabetes mellitus with diabetic polyneuropathy, with long-term current use of insulin (H)   Changed by:  Shannan Benson MD        Dose:  20 mg   Take 4 tablets (20 mg) by mouth daily (before lunch)   Quantity:  360 tablet   Refills:  3       lisinopril-hydrochlorothiazide 20-12.5 MG per tablet   Commonly known as:  PRINZIDE/ZESTORETIC   This may have changed:  how much to take   Used for:  Essential hypertension with goal blood pressure less than 140/90   Changed by:  Shannan Benson MD        Dose:  1 tablet   Take 1 tablet by mouth daily   Quantity:  180 tablet   Refills:  3            Where to get your medicines      These medications were sent to [facility]/pharmacy #[id number] - [place], [place] - [address], [place] [address]    Hours:  24-hours Phone:  [phone]     blood glucose monitoring lancets    blood glucose monitoring meter device kit    blood glucose monitoring test strip    glipiZIDE 5 MG tablet                Primary Care Provider Office Phone # Fax #    Shannan Benson [phone]       [facility] [address] [facility] [address]        Equal Access to Services     [facility] Area[facility]ENED[facility] AH: Hadii aad ku hadasho Soomaali, waaxda luqadaha, qaybta kaalmada adeegyada, waxay hawain hayprestonn valentina liao . So [facility] [phone].    ATENCIÓN: Si habla español, tiene a mcclure disposición servicios gratuitos de asistencia lingüística. Llame al [phone].    We comply with applicable federal civil rights laws and [place]  laws. We do not discriminate on the basis of race, color, national origin, age, disability sex, sexual orientation or gender identity.            Thank you!     Thank you for choosing [facility]  for your care. Our goal is always to provide you with excellent care. Hearing back from our patients is one way we can continue to improve our services. Please take a few minutes to complete the written survey that you may receive in the mail after your visit with us. Thank you!             Your Updated Medication List - Protect others around you: Learn how to safely use, store and throw away your medicines at www.disposemymeds.org.          This list is accurate as of: 7/19/17 11:59 PM.  Always use your most recent med list.                   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 (H)       Alcohol Swabs Pads     100 each    Use as directed twice daily    Type 2 diabetes mellitus with other diabetic ophthalmic complication (H)       ammonium lactate 12 % cream    AMLACTIN    420 g    Apply to feet twice daily    Callus of foot       aspirin 81 MG tablet     90 tablet    Take 1 tablet (81 mg) by mouth daily    Type 2 diabetes mellitus with diabetic polyneuropathy (H)       atorvastatin 40 MG tablet    LIPITOR    90 tablet    Take 1 tablet (40 mg) by mouth daily    Pure hypercholesterolemia       blood glucose monitoring lancets     200 each    Check sugars twice daily (okay to dispense preferred brand cover by insurance)    Type 2 diabetes mellitus with diabetic polyneuropathy, with long-term current use of insulin (H)       blood glucose monitoring meter device kit     1 kit    Check fasting glucose 2 day on insulin (okay to dispense preferred brand cover by insurance)    Type 2 diabetes  mellitus with diabetic polyneuropathy, with long-term current use of insulin (H)       blood glucose monitoring test strip    KEYSHA CONTOUR    200 each    Check sugars 2 times daily (okay to dispense preferred brand cover by insurance)    Type 2 diabetes mellitus with diabetic polyneuropathy, with long-term current use of insulin (H)       capsaicin 0.025 % Crea cream    ZOSTRIX    180 g    Apply twice daily to affected skin    Type 2 diabetes mellitus with diabetic polyneuropathy (H)       cholecalciferol 1000 UNIT tablet    vitamin D    100 tablet    Take 1 tablet (1,000 Units) by mouth daily    Chronic kidney disease, stage III (moderate)       glipiZIDE 5 MG tablet    GLUCOTROL    360 tablet    Take 4 tablets (20 mg) by mouth daily (before lunch)    Type 2 diabetes mellitus with diabetic polyneuropathy, with long-term current use of insulin (H)       insulin glargine 100 UNIT/ML injection    LANTUS    30 mL    70 units SQ in AM 50 units SQ in PM    Type 2 diabetes mellitus with diabetic polyneuropathy, with long-term current use of insulin (H)       insulin pen needle 31G X 8 MM     200 each    Use for insulin injections twice daily    Type 2 diabetes mellitus with diabetic autonomic neuropathy, with long-term current use of insulin (H)       latanoprost 0.005 % ophthalmic solution    XALATAN    2.5 mL    Apply 1 drop to eye daily Instill  1 drop into the affected eye(s) once daily as directed    Glaucoma, unspecified glaucoma, unspecified laterality       liraglutide 18 MG/3ML soln    VICTOZA    27 mL    Inject 1.8 mg Subcutaneous daily    Type 2 diabetes mellitus with diabetic polyneuropathy, with long-term current use of insulin (H)       lisinopril-hydrochlorothiazide 20-12.5 MG per tablet    PRINZIDE/ZESTORETIC    180 tablet    Take 1 tablet by mouth daily    Essential hypertension with goal blood pressure less than 140/90       loratadine 10 MG tablet    CLARITIN    30 tablet    Take 1 tablet (10 mg) by  mouth daily as needed for allergies    Seasonal allergic rhinitis

## 2017-07-19 NOTE — LETTER
July 21, 2017      Omid Adorno  [address] [place] [address]        Dear Omid,    Labs show your kidneys are looking a little dry: two things you need to do 1. Ensure enough water daily at least 3-4 glasses/day.  2. Reduce one of your bp meds (lisinopril/hctz or prinzide) had been previously taking 2 tabs daily and now start taking just 1 tab daily.  Want you to come in in 7-10 days for a recheck of your BMP checking potassium and other salts.     Please see below for your test results.    Resulted Orders   Hemoglobin A1c ([facility])   Result Value Ref Range    Hemoglobin A1C 9.6 (H) 4.1 - 5.7 %   Basic Metabolic Profile ([facility])   Result Value Ref Range    Sodium 140 136 - 145 mmol/L    Potassium 5.3 (H) 3.5 - 5.0 mmol/L    Chloride 111 (H) 98 - 107 mmol/L    CO2, Total 19 (L) 22 - 31 mmol/L    Anion Gap 10 5 - 18 mmol/L    Glucose 68 (L) 70 - 125 mg/dL    Calcium 9.8 8.5 - 10.5 mg/dL    Urea Nitrogen 45 (H) 8 - 22 mg/dL    Creatinine 1.34 (H) 0.60 - 1.10 mg/dL    GFR Estimate If Black 49 (L) >60 mL/min/1.73m2    GFR Estimate 40 (L) >60 mL/min/1.73m2    Narrative    Test performed by:  [facility]  [address] [place] [address]  Fasting Glucose reference range is 70-99 mg/dL per  American Diabetes Association (ADA) guidelines.       If you have any questions, please call the clinic to make an appointment.    Sincerely,    Shannan Benson MD

## 2017-07-20 RX ORDER — LISINOPRIL AND HYDROCHLOROTHIAZIDE 12.5; 2 MG/1; MG/1
1 TABLET ORAL DAILY
Qty: 180 TABLET | Refills: 3 | COMMUNITY
Start: 2017-07-20 | End: 2017-10-02

## 2017-07-21 NOTE — PROGRESS NOTES
Call patient:    Labs show your kidneys are looking a little dry: two things you need to do 1. Ensure enough water daily at least 3-4 glasses/day.  2. Reduce one of your bp meds (lisinopril/hctz or prinzide) had been previously taking 2 tabs daily and now start taking just 1 tab daily.  Want you to come in in 7-10 days for a recheck of your BMP checking potassium and other salts.

## 2017-08-04 ENCOUNTER — MEDICAL CORRESPONDENCE (OUTPATIENT)
Dept: HEALTH INFORMATION MANAGEMENT | Facility: CLINIC | Age: 62
End: 2017-08-04

## 2017-08-08 DIAGNOSIS — M05.741 RHEUMATOID ARTHRITIS INVOLVING BOTH HANDS WITH POSITIVE RHEUMATOID FACTOR (H): ICD-10-CM

## 2017-08-08 DIAGNOSIS — M05.742 RHEUMATOID ARTHRITIS INVOLVING BOTH HANDS WITH POSITIVE RHEUMATOID FACTOR (H): ICD-10-CM

## 2017-08-09 RX ORDER — ACETAMINOPHEN 325 MG/1
325-650 TABLET ORAL EVERY 4 HOURS PRN
Qty: 100 TABLET | Refills: 3 | Status: SHIPPED | OUTPATIENT
Start: 2017-08-09 | End: 2017-12-13

## 2017-09-11 ENCOUNTER — TELEPHONE (OUTPATIENT)
Dept: FAMILY MEDICINE | Facility: CLINIC | Age: 62
End: 2017-09-11

## 2017-09-11 DIAGNOSIS — N18.30 CHRONIC KIDNEY DISEASE, STAGE III (MODERATE) (H): ICD-10-CM

## 2017-09-11 NOTE — TELEPHONE ENCOUNTER
Spoke to Monica from [facility].  Stated record received and pt has diabetes therefore needing lab values for A1C to determine treatment plan. Given Monica last value 9.6 for 7/19/17. To fax last three A1C result to [phone].     Faxed.

## 2017-09-27 ENCOUNTER — OFFICE VISIT (OUTPATIENT)
Dept: FAMILY MEDICINE | Facility: CLINIC | Age: 62
End: 2017-09-27

## 2017-09-27 VITALS
DIASTOLIC BLOOD PRESSURE: 72 MMHG | SYSTOLIC BLOOD PRESSURE: 133 MMHG | HEIGHT: 55 IN | BODY MASS INDEX: 35.18 KG/M2 | WEIGHT: 152 LBS | HEART RATE: 68 BPM | OXYGEN SATURATION: 99 % | TEMPERATURE: 97.6 F

## 2017-09-27 DIAGNOSIS — E11.42 TYPE 2 DIABETES MELLITUS WITH DIABETIC POLYNEUROPATHY, WITH LONG-TERM CURRENT USE OF INSULIN (H): ICD-10-CM

## 2017-09-27 DIAGNOSIS — N18.30 CHRONIC KIDNEY DISEASE, STAGE III (MODERATE) (H): Primary | ICD-10-CM

## 2017-09-27 DIAGNOSIS — I10 ESSENTIAL HYPERTENSION: ICD-10-CM

## 2017-09-27 DIAGNOSIS — Z79.4 TYPE 2 DIABETES MELLITUS WITH DIABETIC POLYNEUROPATHY, WITH LONG-TERM CURRENT USE OF INSULIN (H): ICD-10-CM

## 2017-09-27 DIAGNOSIS — Z23 NEED FOR INFLUENZA VACCINATION: ICD-10-CM

## 2017-09-27 LAB
BUN SERPL-MCNC: 44 MG/DL (ref 7–30)
CALCIUM SERPL-MCNC: 9.4 MG/DL (ref 8.5–10.4)
CHLORIDE SERPLBLD-SCNC: 107 MMOL/L (ref 94–109)
CHOLEST SERPL-MCNC: 101 MG/DL
CHOLEST/HDLC SERPL: 3 RATIO
CO2 SERPL-SCNC: 24 MMOL/L (ref 20–32)
CREAT SERPL-MCNC: 1.4 MG/DL (ref 0.6–1.3)
EGFR CALCULATED (BLACK REFERENCE): 49 ML/MIN
EGFR CALCULATED (NON BLACK REFERENCE): 40.5 ML/MIN
GLUCOSE SERPL-MCNC: 190 MG/DL (ref 60–109)
HDLC SERPL-MCNC: 33 MG/DL
LDLC SERPL CALC-MCNC: 39 MG/DL (ref 0–99)
POTASSIUM SERPL-SCNC: 4.7 MMOL/L (ref 3.4–5.3)
SODIUM SERPL-SCNC: 140 MMOL/L (ref 133–144)
TRIGL SERPL-MCNC: 142 MG/DL
VLDL-CHOLESTEROL: 28 MG/DL (ref 7–32)

## 2017-09-27 NOTE — PROGRESS NOTES
SUBJECTIVE:  Omid Adorno is a 60 year old who presents today for follow up of DIABETES MELLITUS     Patient presents with:  Diabetes: follow-up      1.  Diabetes:  Victoza daily, lantus 70 AM 54 PM, and glipizide 15 in the AM,  Blood glucose averages:14 day ave. 130s  Symptoms of low blood sugar (hypoglycemia:sweating, shaky, weak, dizzy, blurred vision, confusion)? Denies    Problems taking medications regularly? none  Side effects? None    Health maintenance reviewed and appropriate orders placed?  Yes      BP Readings from Last 3 Encounters:   09/27/17 133/72   07/19/17 107/70   07/05/17 109/65     Lab Results   Component Value Date    A1C 9.4 03/08/2017    A1C 10.3 11/30/2016    A1C 9.2 09/13/2016    A1C 9.4 06/01/2016    A1C 10.8 03/18/2016           Recent Labs   Lab Test  05/08/15   0847  04/28/14   0829   CHOL  119.0  147.0   HDL  40.0*  47.0*   LDL  62  65.0  82.0   TRIG  74.0  88.0   CHOLHDLRATIO  3.0  3.1       Wt Readings from Last 3 Encounters:   09/27/17 152 lb (68.9 kg)   07/19/17 152 lb (68.9 kg)   07/05/17 153 lb 6.4 oz (69.6 kg)       Current Outpatient Prescriptions   Medication Sig Dispense Refill     cholecalciferol (VITAMIN D) 1000 UNIT tablet Take 1 tablet (1,000 Units) by mouth daily 100 tablet 3     acetaminophen (TYLENOL) 325 MG tablet Take 1-2 tablets (325-650 mg) by mouth every 4 hours as needed for mild pain 100 tablet 3     lisinopril-hydrochlorothiazide (PRINZIDE/ZESTORETIC) 20-12.5 MG per tablet Take 1 tablet by mouth daily 180 tablet 3     blood glucose monitoring (KEYSHA CONTOUR MONITOR) meter device kit Check fasting glucose 2 day on insulin (okay to dispense preferred brand cover by insurance) 1 kit 3     blood glucose monitoring (KEYSHA MICROLET) lancets Check sugars twice daily (okay to dispense preferred brand cover by insurance) 200 each 3     blood glucose monitoring (KEYSHA CONTOUR) test strip Check sugars 2 times daily (okay to dispense preferred brand cover by insurance) 200  "each 3     glipiZIDE (GLUCOTROL) 5 MG tablet Take 4 tablets (20 mg) by mouth daily (before lunch) 360 tablet 3     insulin pen needle 31G X 8 MM Use for insulin injections twice daily 200 each 3     ammonium lactate (AMLACTIN) 12 % cream Apply to feet twice daily 420 g 3     liraglutide (VICTOZA) 18 MG/3ML soln Inject 1.8 mg Subcutaneous daily 27 mL 3     latanoprost (XALATAN) 0.005 % ophthalmic solution Apply 1 drop to eye daily Instill  1 drop into the affected eye(s) once daily as directed 2.5 mL 6     insulin glargine (LANTUS) 100 UNIT/ML injection 70 units SQ in AM 50 units SQ in PM 30 mL 11     aspirin 81 MG tablet Take 1 tablet (81 mg) by mouth daily 90 tablet 3     atorvastatin (LIPITOR) 40 MG tablet Take 1 tablet (40 mg) by mouth daily 90 tablet 3     capsaicin (ZOSTRIX) 0.025 % CREA Apply twice daily to affected skin 180 g 10     loratadine (CLARITIN) 10 MG tablet Take 1 tablet (10 mg) by mouth daily as needed for allergies 30 tablet 10     Alcohol Swabs PADS Use as directed twice daily 100 each 1       Histories reviewed and updated in Epic.      ROS:  C: NEGATIVE for fatigue, unexpected change in weight  R: NEGATIVE for significant cough or shortness of breath  CV: NEGATIVE for chest pain, palpitations or new or worsening peripheral edema  HEME/ALLERGY/IMMUNE: no known seasonal allergies  P: NEGATIVE for changes in mood or affect    EXAM:  Vitals: /72 (BP Location: Right arm, Patient Position: Chair, Cuff Size: Adult Regular)  Pulse 68  Temp 97.6  F (36.4  C) (Oral)  Ht 4' 7.25\" (140.3 cm)  Wt 152 lb (68.9 kg)  SpO2 99%  BMI 35.01 kg/m2  BMIE= Body mass index is 35.01 kg/(m^2).  GENERAL APPEARANCE: alert and no acute distress  RESP: lungs clear to auscultation - no rales, rhonchi or wheezes  CV: regular rate and rhythm, normal S1 S2, no S3 or S4 and no murmur, click or rub -    ASSESSMENT AND PLAN:  (N18.3) Chronic kidney disease, stage III (moderate)  (primary encounter diagnosis)  Comment: " Now normal K, stable  Plan: Basic Metabolic Panel ([facility])  - Results < 1         hr        No change    (E11.42,  Z79.4) Type 2 diabetes mellitus with diabetic polyneuropathy, with long-term current use of insulin (H)  Comment: has been elevated but current glucose all in range,   Plan: CANCELED: Hemoglobin A1c (P )            (Z23) Need for influenza vaccination  Comment: agrees  Plan: ADMIN VACCINE, INITIAL, Flu vaccine, quad,         preserve-free, 0.5 ml            (I10) Essential hypertension  Comment: controlled  Plan: Lipid Panel (P )

## 2017-09-27 NOTE — NURSING NOTE
Omid Adorno      1.  Has the patient received the information for the influenza vaccine? YES    2.  Does the patient have any of the following contraindications?     Allergy to eggs? No     Allergic reaction to previous influenza vaccines? No     Any other problems to previous influenza vaccines? No     Paralyzed by Guillain-Benton syndrome? No     Currently pregnant? NO     Current moderate or severe illness? No    Vaccination given by Ady CAROLINA CMA.  Recorded by ADY JESUS

## 2017-09-27 NOTE — MR AVS SNAPSHOT
After Visit Summary   9/27/2017    Omid Adorno    MRN: [id number]           Patient Information     Date Of Birth          1955        Visit Information        Provider Department      9/27/2017 4:00 PM Shannan Benson MD [facility]        Today's Diagnoses     Chronic kidney disease, stage III (moderate)    -  1    Type 2 diabetes mellitus with diabetic polyneuropathy, with long-term current use of insulin (H)        Need for influenza vaccination        Essential hypertension          Care Instructions    - Follow-up diabetes in 1 month.  - Try to sit down with family members and fill out advance directive. Bring back to the clinic after filling out to be scanned into your chart.    Your medication list is printed, please keep this with you, it is helpful to bring this current list to any other medical appointments, the emergency room or hospital.    If you had lab testing today and your results are reassuring or normal they will be be mailed to you within 7 days.     If the lab tests need quick action we will call you with the results.   The phone number we will call with results is # [phone] (home) . If this is not the best number please call our clinic and change the number.    If you need any refills please call your pharmacy and they will contact us.    If you have any further concerns or wish to schedule another appointment you must call our office during normal business hours  [phone] (8-5:00 M-F)  If you have urgent medical questions that cannot wait  you may also call [phone] at any time of day.  If you have a medical emergency please call [phone].    Thank you for coming to [facility].            Follow-ups after your visit        Your next 10 appointments already scheduled     Oct 25, 2017  4:20 PM CDT   Return Visit with Shannan Benson MD   [facility] ([facility])    [address] [place] [address]   [phone]     "          Who to contact     Please call your clinic at [phone] to:    Ask questions about your health    Make or cancel appointments    Discuss your medicines    Learn about your test results    Speak to your doctor   If you have compliments or concerns about an experience at your clinic, or if you wish to file a complaint, please contact [facility] Patient Relations at [phone] or email us at [email or web address]         Additional Information About Your Visit        Care EveryWhere ID     This is your Care EveryWhere ID. This could be used by other organizations to access your [place] medical records  [id number]        Your Vitals Were     Pulse Temperature Height Pulse Oximetry BMI (Body Mass Index)       68 97.6  F (36.4  C) (Oral) 4' 7.25\" (140.3 cm) 99% 35.01 kg/m2        Blood Pressure from Last 3 Encounters:   09/27/17 133/72   07/19/17 107/70   07/05/17 109/65    Weight from Last 3 Encounters:   09/27/17 152 lb (68.9 kg)   07/19/17 152 lb (68.9 kg)   07/05/17 153 lb 6.4 oz (69.6 kg)              We Performed the Following     ADMIN VACCINE, INITIAL     Basic Metabolic Panel ([facility])  - Results < 1 hr     Flu vaccine, quad, preserve-free, 0.5 ml     Lipid Panel (P [facility])        Primary Care Provider Office Phone # Fax #    Shannan Partick Benson [phone]       [facility] [address] [place] [address]        Equal Access to Services     KESHIA JULIEN AH: Hadii aad ku hadasho Soomaali, waaxda luqadaha, qaybta kaalmada adeegyada, waxay cori soria. So [facility] [phone].    ATENCIÓN: Si habla español, tiene a mcclure disposición servicios gratuitos de asistencia lingüística. Llame al [phone].    We comply with applicable federal civil rights laws and [place] laws. We do not discriminate on the basis of race, color, national origin, age, disability sex, sexual orientation or gender identity.          "   Thank you!     Thank you for choosing [facility]  for your care. Our goal is always to provide you with excellent care. Hearing back from our patients is one way we can continue to improve our services. Please take a few minutes to complete the written survey that you may receive in the mail after your visit with us. Thank you!             Your Updated Medication List - Protect others around you: Learn how to safely use, store and throw away your medicines at www.disposemymeds.org.          This list is accurate as of: 9/27/17  4:40 PM.  Always use your most recent med list.                   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 (H)       Alcohol Swabs Pads     100 each    Use as directed twice daily    Type 2 diabetes mellitus with other diabetic ophthalmic complication (H)       ammonium lactate 12 % cream    AMLACTIN    420 g    Apply to feet twice daily    Callus of foot       aspirin 81 MG tablet     90 tablet    Take 1 tablet (81 mg) by mouth daily    Type 2 diabetes mellitus with diabetic polyneuropathy (H)       atorvastatin 40 MG tablet    LIPITOR    90 tablet    Take 1 tablet (40 mg) by mouth daily    Pure hypercholesterolemia       blood glucose monitoring lancets     200 each    Check sugars twice daily (okay to dispense preferred brand cover by insurance)    Type 2 diabetes mellitus with diabetic polyneuropathy, with long-term current use of insulin (H)       blood glucose monitoring meter device kit     1 kit    Check fasting glucose 2 day on insulin (okay to dispense preferred brand cover by insurance)    Type 2 diabetes mellitus with diabetic polyneuropathy, with long-term current use of insulin (H)       blood glucose monitoring test strip    KEYSHA CONTOUR    200  each    Check sugars 2 times daily (okay to dispense preferred brand cover by insurance)    Type 2 diabetes mellitus with diabetic polyneuropathy, with long-term current use of insulin (H)       capsaicin 0.025 % Crea cream    ZOSTRIX    180 g    Apply twice daily to affected skin    Type 2 diabetes mellitus with diabetic polyneuropathy (H)       cholecalciferol 1000 UNIT tablet    vitamin D    100 tablet    Take 1 tablet (1,000 Units) by mouth daily    Chronic kidney disease, stage III (moderate)       glipiZIDE 5 MG tablet    GLUCOTROL    360 tablet    Take 4 tablets (20 mg) by mouth daily (before lunch)    Type 2 diabetes mellitus with diabetic polyneuropathy, with long-term current use of insulin (H)       insulin glargine 100 UNIT/ML injection    LANTUS    30 mL    70 units SQ in AM 50 units SQ in PM    Type 2 diabetes mellitus with diabetic polyneuropathy, with long-term current use of insulin (H)       insulin pen needle 31G X 8 MM     200 each    Use for insulin injections twice daily    Type 2 diabetes mellitus with diabetic autonomic neuropathy, with long-term current use of insulin (H)       latanoprost 0.005 % ophthalmic solution    XALATAN    2.5 mL    Apply 1 drop to eye daily Instill  1 drop into the affected eye(s) once daily as directed    Glaucoma, unspecified glaucoma, unspecified laterality       liraglutide 18 MG/3ML soln    VICTOZA    27 mL    Inject 1.8 mg Subcutaneous daily    Type 2 diabetes mellitus with diabetic polyneuropathy, with long-term current use of insulin (H)       lisinopril-hydrochlorothiazide 20-12.5 MG per tablet    PRINZIDE/ZESTORETIC    180 tablet    Take 1 tablet by mouth daily    Essential hypertension with goal blood pressure less than 140/90       loratadine 10 MG tablet    CLARITIN    30 tablet    Take 1 tablet (10 mg) by mouth daily as needed for allergies    Seasonal allergic rhinitis

## 2017-09-27 NOTE — PATIENT INSTRUCTIONS
- Follow-up diabetes in 1 month.  - Try to sit down with family members and fill out advance directive. Bring back to the clinic after filling out to be scanned into your chart.    Your medication list is printed, please keep this with you, it is helpful to bring this current list to any other medical appointments, the emergency room or hospital.    If you had lab testing today and your results are reassuring or normal they will be be mailed to you within 7 days.     If the lab tests need quick action we will call you with the results.   The phone number we will call with results is # [phone] (home) . If this is not the best number please call our clinic and change the number.    If you need any refills please call your pharmacy and they will contact us.    If you have any further concerns or wish to schedule another appointment you must call our office during normal business hours  [phone] (8-5:00 M-F)  If you have urgent medical questions that cannot wait  you may also call [phone] at any time of day.  If you have a medical emergency please call [phone].    Thank you for coming to [facility].

## 2017-10-02 DIAGNOSIS — E11.42 TYPE 2 DIABETES MELLITUS WITH DIABETIC POLYNEUROPATHY, UNSPECIFIED LONG TERM INSULIN USE STATUS: ICD-10-CM

## 2017-10-02 DIAGNOSIS — I10 ESSENTIAL HYPERTENSION WITH GOAL BLOOD PRESSURE LESS THAN 140/90: ICD-10-CM

## 2017-10-02 DIAGNOSIS — E78.00 PURE HYPERCHOLESTEROLEMIA: ICD-10-CM

## 2017-10-02 DIAGNOSIS — J30.2 SEASONAL ALLERGIC RHINITIS, UNSPECIFIED CHRONICITY, UNSPECIFIED TRIGGER: ICD-10-CM

## 2017-10-02 RX ORDER — LORATADINE 10 MG/1
10 TABLET ORAL DAILY PRN
Qty: 30 TABLET | Refills: 10 | Status: SHIPPED | OUTPATIENT
Start: 2017-10-02 | End: 2020-01-06

## 2017-10-02 RX ORDER — ATORVASTATIN CALCIUM 40 MG/1
40 TABLET, FILM COATED ORAL DAILY
Qty: 90 TABLET | Refills: 3 | Status: SHIPPED | OUTPATIENT
Start: 2017-10-02 | End: 2018-11-07

## 2017-10-02 RX ORDER — LISINOPRIL AND HYDROCHLOROTHIAZIDE 12.5; 2 MG/1; MG/1
1 TABLET ORAL DAILY
Qty: 180 TABLET | Refills: 3 | Status: SHIPPED | OUTPATIENT
Start: 2017-10-02 | End: 2018-06-27 | Stop reason: SINTOL

## 2017-10-25 ENCOUNTER — OFFICE VISIT (OUTPATIENT)
Dept: FAMILY MEDICINE | Facility: CLINIC | Age: 62
End: 2017-10-25

## 2017-10-25 VITALS
OXYGEN SATURATION: 97 % | SYSTOLIC BLOOD PRESSURE: 104 MMHG | WEIGHT: 151.8 LBS | DIASTOLIC BLOOD PRESSURE: 64 MMHG | HEART RATE: 70 BPM | HEIGHT: 55 IN | BODY MASS INDEX: 35.13 KG/M2 | TEMPERATURE: 97.6 F

## 2017-10-25 DIAGNOSIS — E11.42 TYPE 2 DIABETES MELLITUS WITH DIABETIC POLYNEUROPATHY, WITH LONG-TERM CURRENT USE OF INSULIN (H): ICD-10-CM

## 2017-10-25 DIAGNOSIS — Z79.4 TYPE 2 DIABETES MELLITUS WITH DIABETIC POLYNEUROPATHY, WITH LONG-TERM CURRENT USE OF INSULIN (H): ICD-10-CM

## 2017-10-25 LAB — HBA1C MFR BLD: 10 % (ref 4.1–5.7)

## 2017-10-25 RX ORDER — PIOGLITAZONEHYDROCHLORIDE 15 MG/1
15 TABLET ORAL DAILY
Qty: 30 TABLET | Refills: 3 | Status: SHIPPED | OUTPATIENT
Start: 2017-10-25 | End: 2017-11-29

## 2017-10-25 RX ORDER — LIRAGLUTIDE 6 MG/ML
1.8 INJECTION SUBCUTANEOUS DAILY
Qty: 27 ML | Refills: 3 | Status: SHIPPED | OUTPATIENT
Start: 2017-10-25 | End: 2018-11-07

## 2017-10-25 NOTE — MR AVS SNAPSHOT
"              After Visit Summary   10/25/2017    Omid Adorno    MRN: [id number]           Patient Information     Date Of Birth          1955        Visit Information        Provider Department      10/25/2017 4:20 PM Shannan Benson MD [facility]        Today's Diagnoses     Type 2 diabetes mellitus with diabetic polyneuropathy, with long-term current use of insulin (H)           Follow-ups after your visit        Your next 10 appointments already scheduled     Nov 29, 2017  4:20 PM CST   Return Visit with Shannan Benson MD   [facility] ([facility])    [address] [place] [address]   [phone]              Who to contact     Please call your clinic at [phone] to:    Ask questions about your health    Make or cancel appointments    Discuss your medicines    Learn about your test results    Speak to your doctor   If you have compliments or concerns about an experience at your clinic, or if you wish to file a complaint, please contact [facility] Patient Relations at [phone] or email us at [email or web address]         Additional Information About Your Visit        Care EveryWhere ID     This is your Care EveryWhere ID. This could be used by other organizations to access your [place] medical records  [id number]        Your Vitals Were     Pulse Temperature Height Pulse Oximetry BMI (Body Mass Index)       70 97.6  F (36.4  C) (Oral) 4' 7.25\" (140.3 cm) 97% 34.96 kg/m2        Blood Pressure from Last 3 Encounters:   10/25/17 104/64   09/27/17 133/72   07/19/17 107/70    Weight from Last 3 Encounters:   10/25/17 151 lb 12.8 oz (68.9 kg)   09/27/17 152 lb (68.9 kg)   07/19/17 152 lb (68.9 kg)              We Performed the Following     Hemoglobin A1c ([facility])          Today's Medication Changes          These changes are accurate as of: 10/25/17 11:59 PM.  If you have any questions, ask your nurse or doctor.       "         Start taking these medicines.        Dose/Directions    pioglitazone 15 MG tablet   Commonly known as:  ACTOS   Used for:  Type 2 diabetes mellitus with diabetic polyneuropathy, with long-term current use of insulin (H)   Started by:  Shannan Benson MD        Dose:  15 mg   Take 1 tablet (15 mg) by mouth daily   Quantity:  30 tablet   Refills:  3            Where to get your medicines      These medications were sent to [facility]/pharmacy #[id number] - [place], [place] - [address] [facility]  [address], [place] [address]    Hours:  24-hours Phone:  [phone]     insulin glargine 100 UNIT/ML injection    liraglutide 18 MG/3ML soln    pioglitazone 15 MG tablet                Primary Care Provider Office Phone # Fax #    Shannan Benson [phone]       [facility] [address] [facility] [address]        Equal Access to Services     [facility]: Hadii aad ku hadasho Soomaali, waaxda luqadaha, qaybta kaalmada adeegyada, waxay idiin hayaan aderhina liao . So [facility] [phone].    ATENCIÓN: Si habla español, tiene a mcclure disposición servicios gratuitos de asistencia lingüística. Llame al [phone].    We comply with applicable federal civil rights laws and [place] laws. We do not discriminate on the basis of race, color, national origin, age, disability, sex, sexual orientation, or gender identity.            Thank you!     Thank you for choosing [facility]  for your care. Our goal is always to provide you with excellent care. Hearing back from our patients is one way we can continue to improve our services. Please take a few minutes to complete the written survey that you may receive in the mail after your visit with us. Thank you!             Your Updated Medication List - Protect others around you: Learn how to safely use, store and throw away your medicines at www.disposemymeds.org.          This list is accurate as of: 10/25/17 11:59 PM.   Always use your most recent med list.                   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 (H)       Alcohol Swabs Pads     100 each    Use as directed twice daily    Type 2 diabetes mellitus with other diabetic ophthalmic complication       ammonium lactate 12 % cream    AMLACTIN    420 g    Apply to feet twice daily    Callus of foot       aspirin 81 MG tablet     90 tablet    Take 1 tablet (81 mg) by mouth daily    Type 2 diabetes mellitus with diabetic polyneuropathy, unspecified long term insulin use status (H)       atorvastatin 40 MG tablet    LIPITOR    90 tablet    Take 1 tablet (40 mg) by mouth daily    Pure hypercholesterolemia       blood glucose monitoring lancets     200 each    Check sugars twice daily (okay to dispense preferred brand cover by insurance)    Type 2 diabetes mellitus with diabetic polyneuropathy, with long-term current use of insulin (H)       blood glucose monitoring meter device kit     1 kit    Check fasting glucose 2 day on insulin (okay to dispense preferred brand cover by insurance)    Type 2 diabetes mellitus with diabetic polyneuropathy, with long-term current use of insulin (H)       blood glucose monitoring test strip    KEYSHA CONTOUR    200 each    Check sugars 2 times daily (okay to dispense preferred brand cover by insurance)    Type 2 diabetes mellitus with diabetic polyneuropathy, with long-term current use of insulin (H)       capsaicin 0.025 % Crea cream    ZOSTRIX    180 g    Apply twice daily to affected skin    Type 2 diabetes mellitus with diabetic polyneuropathy (H)       cholecalciferol 1000 UNIT tablet    vitamin D3    100 tablet    Take 1 tablet (1,000 Units) by mouth daily    Chronic kidney disease, stage III (moderate)       glipiZIDE  5 MG tablet    GLUCOTROL    360 tablet    Take 4 tablets (20 mg) by mouth daily (before lunch)    Type 2 diabetes mellitus with diabetic polyneuropathy, with long-term current use of insulin (H)       insulin glargine 100 UNIT/ML injection    LANTUS    30 mL    70 units SQ in AM 50 units SQ in PM    Type 2 diabetes mellitus with diabetic polyneuropathy, with long-term current use of insulin (H)       insulin pen needle 31G X 8 MM     200 each    Use for insulin injections twice daily    Type 2 diabetes mellitus with diabetic autonomic neuropathy, with long-term current use of insulin (H)       liraglutide 18 MG/3ML soln    VICTOZA    27 mL    Inject 1.8 mg Subcutaneous daily    Type 2 diabetes mellitus with diabetic polyneuropathy, with long-term current use of insulin (H)       lisinopril-hydrochlorothiazide 20-12.5 MG per tablet    PRINZIDE/ZESTORETIC    180 tablet    Take 1 tablet by mouth daily    Essential hypertension with goal blood pressure less than 140/90       loratadine 10 MG tablet    CLARITIN    30 tablet    Take 1 tablet (10 mg) by mouth daily as needed for allergies    Seasonal allergic rhinitis, unspecified chronicity, unspecified trigger       pioglitazone 15 MG tablet    ACTOS    30 tablet    Take 1 tablet (15 mg) by mouth daily    Type 2 diabetes mellitus with diabetic polyneuropathy, with long-term current use of insulin (H)

## 2017-10-25 NOTE — PROGRESS NOTES
SUBJECTIVE:  Omid Adorno is a 60 year old who presents today for follow up of DIABETES MELLITUS     Patient presents with:  Diabetes: follow-up      1.  Diabetes:  Victoza daily, lantus 70 AM 54 PM, and glipizide 15 in the AM,  Blood glucose averages: didn't bring in meter, but AM fastings all low 100s  Symptoms of low blood sugar (hypoglycemia:sweating, shaky, weak, dizzy, blurred vision, confusion)? Denies    Problems taking medications regularly? none  Side effects? None    Health maintenance reviewed and appropriate orders placed?  Yes      BP Readings from Last 3 Encounters:   10/25/17 104/64   09/27/17 133/72   07/19/17 107/70     Lab Results   Component Value Date    A1C 10.0 10/25/2017    A1C 9.6 07/19/2017    A1C 9.4 03/08/2017    A1C 10.3 11/30/2016    A1C 9.2 09/13/2016         Recent Labs   Lab Test  05/08/15   0847  04/28/14   0829   CHOL  119.0  147.0   HDL  40.0*  47.0*   LDL  62  65.0  82.0   TRIG  74.0  88.0   CHOLHDLRATIO  3.0  3.1       Wt Readings from Last 3 Encounters:   10/25/17 151 lb 12.8 oz (68.9 kg)   09/27/17 152 lb (68.9 kg)   07/19/17 152 lb (68.9 kg)       Current Outpatient Prescriptions   Medication Sig Dispense Refill     atorvastatin (LIPITOR) 40 MG tablet Take 1 tablet (40 mg) by mouth daily 90 tablet 3     lisinopril-hydrochlorothiazide (PRINZIDE/ZESTORETIC) 20-12.5 MG per tablet Take 1 tablet by mouth daily 180 tablet 3     loratadine (CLARITIN) 10 MG tablet Take 1 tablet (10 mg) by mouth daily as needed for allergies 30 tablet 10     cholecalciferol (VITAMIN D) 1000 UNIT tablet Take 1 tablet (1,000 Units) by mouth daily 100 tablet 3     blood glucose monitoring (KEYSHA CONTOUR MONITOR) meter device kit Check fasting glucose 2 day on insulin (okay to dispense preferred brand cover by insurance) 1 kit 3     blood glucose monitoring (KEYSHA MICROLET) lancets Check sugars twice daily (okay to dispense preferred brand cover by insurance) 200 each 3     blood glucose monitoring (KEYSHA  "CONTOUR) test strip Check sugars 2 times daily (okay to dispense preferred brand cover by insurance) 200 each 3     glipiZIDE (GLUCOTROL) 5 MG tablet Take 4 tablets (20 mg) by mouth daily (before lunch) 360 tablet 3     insulin pen needle 31G X 8 MM Use for insulin injections twice daily 200 each 3     liraglutide (VICTOZA) 18 MG/3ML soln Inject 1.8 mg Subcutaneous daily 27 mL 3     latanoprost (XALATAN) 0.005 % ophthalmic solution Apply 1 drop to eye daily Instill  1 drop into the affected eye(s) once daily as directed 2.5 mL 6     insulin glargine (LANTUS) 100 UNIT/ML injection 70 units SQ in AM 50 units SQ in PM 30 mL 11     aspirin 81 MG tablet Take 1 tablet (81 mg) by mouth daily 90 tablet 3     acetaminophen (TYLENOL) 325 MG tablet Take 1-2 tablets (325-650 mg) by mouth every 4 hours as needed for mild pain 100 tablet 3     ammonium lactate (AMLACTIN) 12 % cream Apply to feet twice daily 420 g 3     capsaicin (ZOSTRIX) 0.025 % CREA Apply twice daily to affected skin 180 g 10     Alcohol Swabs PADS Use as directed twice daily 100 each 1       Histories reviewed and updated in Epic.      ROS:  C: NEGATIVE for fatigue, unexpected change in weight  R: NEGATIVE for significant cough or shortness of breath  CV: NEGATIVE for chest pain, palpitations or new or worsening peripheral edema  HEME/ALLERGY/IMMUNE: no known seasonal allergies  P: NEGATIVE for changes in mood or affect    EXAM:  Vitals: /64 (BP Location: Right arm, Patient Position: Chair, Cuff Size: Adult Regular)  Pulse 70  Temp 97.6  F (36.4  C) (Oral)  Ht 4' 7.25\" (140.3 cm)  Wt 151 lb 12.8 oz (68.9 kg)  SpO2 97%  BMI 34.96 kg/m2  BMIE= Body mass index is 34.96 kg/(m^2).  GENERAL APPEARANCE: alert and no acute distress  RESP: lungs clear to auscultation - no rales, rhonchi or wheezes  CV: regular rate and rhythm, normal S1 S2, no S3 or S4 and no murmur, click or rub -    ASSESSMENT AND PLAN:  (E11.42,  Z79.4) Type 2 diabetes mellitus with " diabetic polyneuropathy, with long-term current use of insulin (H)  Comment: uncontrolled will add on actos and have check sugars 4 times/day (fasting, 1-2 hours/meals)  Plan: liraglutide (VICTOZA) 18 MG/3ML soln, insulin         glargine (LANTUS) 100 UNIT/ML injection,         Hemoglobin A1c ([facility]), pioglitazone (ACTOS)         15 MG tablet          Discussed with patient option of wearing CGM.  Patient was not interested in this at this time.  Would consider.  Will return in 3 weeks before actos is gone to check glucometer and readdress if CGM would be an option for patient in controlling sugars.

## 2017-10-26 DIAGNOSIS — H42 GLAUCOMA OF BOTH EYES ASSOCIATED WITH UNDERLYING DISEASE: Primary | ICD-10-CM

## 2017-10-26 RX ORDER — LATANOPROST 50 UG/ML
1 SOLUTION/ DROPS OPHTHALMIC DAILY
Qty: 2.5 ML | Refills: 11 | Status: SHIPPED | OUTPATIENT
Start: 2017-10-26 | End: 2018-10-17

## 2017-10-27 ENCOUNTER — TELEPHONE (OUTPATIENT)
Dept: FAMILY MEDICINE | Facility: CLINIC | Age: 62
End: 2017-10-27

## 2017-10-27 NOTE — TELEPHONE ENCOUNTER
[facility] phone call message- general phone call:    Reason for call: Patient called stating that after she saw her doctor on 10/25 she went to the pharmacy to  her prescriptions. She was expecting a medication for DM as well but it wasn't there. The only medication she got was the eye drops. She does not know the name of the DM medication.     Return call needed: Yes    OK to leave a message on voice mail? Yes    Primary language: ong      needed? Yes    Call taken on October 27, 2017 at 9:07 AM by Charity Wilson

## 2017-10-27 NOTE — TELEPHONE ENCOUNTER
Informed Omid, I have called [facility] and they have confirmed they have received Lantus, Victoza and oral med Actos 15mg. Omid states she was expecting a new DM med, name unknown. Explained to Omid Actos 45mg was what she was on in 2012. Which no longer on until 10/25/2017 again but at a dose of 15mg Actos. [facility] is currently at adult day center, when she returns home will recheck her received medications to make sure it's Actos 15mg. Will call back if there are further questions.  Ten FELICIANO

## 2017-10-31 ENCOUNTER — TELEPHONE (OUTPATIENT)
Dept: FAMILY MEDICINE | Facility: CLINIC | Age: 62
End: 2017-10-31

## 2017-10-31 NOTE — TELEPHONE ENCOUNTER
Called and informed patient. Patient is agreeable to come in for visit with PharmD. Scheduled for 11.3.2017 at 3pm.  to be requested.     ---Ezequiel HOOD CMA ([place])

## 2017-10-31 NOTE — TELEPHONE ENCOUNTER
[facility] phone call message- general phone call:    Reason for call: Patient called stating ever since 10/25 her blood sugar went up very high between 200-300 she feels just fine. She thinks that her diabetes meter isn't working properly and is requesting to get a new one if that's the case. She also stated that her diabetes medications aren't very effective either; meds doesn't seem to help reduce her blood sugar or blood sugar stays the same. She isn't quite sure what to do if she has no symptoms when blood sugar is high. Dr. Benson will be here this afternoon and she'll wait to hear what Dr. Benson would like her to do.     Return call needed: Yes    OK to leave a message on voice mail? Yes    Primary language: Rafong      needed? Yes    Call taken on October 31, 2017 at 10:00 AM by Charity Wilson

## 2017-10-31 NOTE — TELEPHONE ENCOUNTER
I think the meter is correct as the test here (a1c) which was also high suggests most sugars are 200-300.  Would like patient to come in to meet with our PHarmD Parul Stanley to discuss options to get sugars under better control.

## 2017-11-29 ENCOUNTER — OFFICE VISIT (OUTPATIENT)
Dept: FAMILY MEDICINE | Facility: CLINIC | Age: 62
End: 2017-11-29

## 2017-11-29 ENCOUNTER — TRANSFERRED RECORDS (OUTPATIENT)
Dept: HEALTH INFORMATION MANAGEMENT | Facility: CLINIC | Age: 62
End: 2017-11-29

## 2017-11-29 VITALS
OXYGEN SATURATION: 98 % | SYSTOLIC BLOOD PRESSURE: 135 MMHG | TEMPERATURE: 97.6 F | HEART RATE: 72 BPM | DIASTOLIC BLOOD PRESSURE: 75 MMHG | WEIGHT: 154 LBS | BODY MASS INDEX: 35.47 KG/M2 | RESPIRATION RATE: 16 BRPM

## 2017-11-29 DIAGNOSIS — Z79.4 TYPE 2 DIABETES MELLITUS WITH DIABETIC POLYNEUROPATHY, WITH LONG-TERM CURRENT USE OF INSULIN (H): ICD-10-CM

## 2017-11-29 DIAGNOSIS — E11.42 TYPE 2 DIABETES MELLITUS WITH DIABETIC POLYNEUROPATHY, WITH LONG-TERM CURRENT USE OF INSULIN (H): ICD-10-CM

## 2017-11-29 RX ORDER — PIOGLITAZONEHYDROCHLORIDE 15 MG/1
15 TABLET ORAL DAILY
Qty: 30 TABLET | Refills: 3 | Status: SHIPPED | OUTPATIENT
Start: 2017-11-29 | End: 2018-07-09

## 2017-11-29 NOTE — NURSING NOTE
PHQ9 - given today     name: Miguel Elvira  Language: Hmong  Agency: [employer]  Phone number: [phone]

## 2017-11-29 NOTE — MR AVS SNAPSHOT
After Visit Summary   11/29/2017    Omid Adorno    MRN: [id number]           Patient Information     Date Of Birth          1955        Visit Information        Provider Department      11/29/2017 4:20 PM Shannan Benson MD [facility]        Today's Diagnoses     Type 2 diabetes mellitus with diabetic polyneuropathy, with long-term current use of insulin (H)           Follow-ups after your visit        Who to contact     Please call your clinic at [phone] to:    Ask questions about your health    Make or cancel appointments    Discuss your medicines    Learn about your test results    Speak to your doctor   If you have compliments or concerns about an experience at your clinic, or if you wish to file a complaint, please contact [facility] Patient Relations at [phone] or email us at [email or web address]         Additional Information About Your Visit        Care EveryWhere ID     This is your Care EveryWhere ID. This could be used by other organizations to access your [place] medical records  [id number]        Your Vitals Were     Pulse Temperature Respirations Pulse Oximetry BMI (Body Mass Index)       72 97.6  F (36.4  C) (Oral) 16 98% 35.47 kg/m2        Blood Pressure from Last 3 Encounters:   11/29/17 135/75   10/25/17 104/64   09/27/17 133/72    Weight from Last 3 Encounters:   11/29/17 154 lb (69.9 kg)   10/25/17 151 lb 12.8 oz (68.9 kg)   09/27/17 152 lb (68.9 kg)              Today, you had the following     No orders found for display         Today's Medication Changes          These changes are accurate as of: 11/29/17  4:57 PM.  If you have any questions, ask your nurse or doctor.               These medicines have changed or have updated prescriptions.        Dose/Directions    ammonium lactate 12 % cream   Commonly known as:  AMLACTIN   This may have changed:  additional instructions   Used for:  Callus of foot         Apply to feet twice daily   Quantity:  420 g   Refills:  3       glipiZIDE 5 MG tablet   Commonly known as:  GLUCOTROL   This may have changed:  how much to take   Used for:  Type 2 diabetes mellitus with diabetic polyneuropathy, with long-term current use of insulin (H)        Dose:  20 mg   Take 4 tablets (20 mg) by mouth daily (before lunch)   Quantity:  360 tablet   Refills:  3       insulin glargine 100 UNIT/ML injection   Commonly known as:  LANTUS   This may have changed:  additional instructions   Used for:  Type 2 diabetes mellitus with diabetic polyneuropathy, with long-term current use of insulin (H)        70 units SQ in AM 50 units SQ in PM   Quantity:  30 mL   Refills:  11            Where to get your medicines      These medications were sent to [facility]/pharmacy #[id number] - [place], [place] - [address], [place] [address]     Phone:  [phone]     pioglitazone 15 MG tablet                Primary Care Provider Office Phone # Fax #    Shannan Benson [phone]       [facility] [address] [place] [id number]        Equal Access to Services     [facility] Hospital[facility] : Hadii anselmo moore hadasho Soomaali, waaxda luqadaha, qaybta kaalmada aderhinayarogerio, kyle liao . So [facility] [phone].    ATENCIÓN: Si habla español, tiene a mcclure disposición servicios gratuitos de asistencia lingüística. [facility] [phone].    We comply with applicable federal civil rights laws and [place] laws. We do not discriminate on the basis of race, color, national origin, age, disability, sex, sexual orientation, or gender identity.            Thank you!     Thank you for choosing [facility]  for your care. Our goal is always to provide you with excellent care. Hearing back from our patients is one way we can continue to improve our services. Please take a few minutes to complete the written survey that you may receive in the mail after  your visit with us. Thank you!             Your Updated Medication List - Protect others around you: Learn how to safely use, store and throw away your medicines at www.disposemymeds.org.          This list is accurate as of: 11/29/17  4:57 PM.  Always use your most recent med list.                   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 (H)       Alcohol Swabs Pads     100 each    Use as directed twice daily    Type 2 diabetes mellitus with other diabetic ophthalmic complication       ammonium lactate 12 % cream    AMLACTIN    420 g    Apply to feet twice daily    Callus of foot       aspirin 81 MG tablet     90 tablet    Take 1 tablet (81 mg) by mouth daily    Type 2 diabetes mellitus with diabetic polyneuropathy, unspecified long term insulin use status (H)       atorvastatin 40 MG tablet    LIPITOR    90 tablet    Take 1 tablet (40 mg) by mouth daily    Pure hypercholesterolemia       blood glucose monitoring lancets     200 each    Check sugars twice daily (okay to dispense preferred brand cover by insurance)    Type 2 diabetes mellitus with diabetic polyneuropathy, with long-term current use of insulin (H)       blood glucose monitoring meter device kit     1 kit    Check fasting glucose 2 day on insulin (okay to dispense preferred brand cover by insurance)    Type 2 diabetes mellitus with diabetic polyneuropathy, with long-term current use of insulin (H)       blood glucose monitoring test strip    KEYSHA CONTOUR    200 each    Check sugars 2 times daily (okay to dispense preferred brand cover by insurance)    Type 2 diabetes mellitus with diabetic polyneuropathy, with long-term current use of insulin (H)       capsaicin 0.025 % Crea cream    ZOSTRIX    180 g    Apply twice daily to  affected skin    Type 2 diabetes mellitus with diabetic polyneuropathy (H)       cholecalciferol 1000 UNIT tablet    vitamin D3    100 tablet    Take 1 tablet (1,000 Units) by mouth daily    Chronic kidney disease, stage III (moderate)       glipiZIDE 5 MG tablet    GLUCOTROL    360 tablet    Take 4 tablets (20 mg) by mouth daily (before lunch)    Type 2 diabetes mellitus with diabetic polyneuropathy, with long-term current use of insulin (H)       insulin glargine 100 UNIT/ML injection    LANTUS    30 mL    70 units SQ in AM 50 units SQ in PM    Type 2 diabetes mellitus with diabetic polyneuropathy, with long-term current use of insulin (H)       insulin pen needle 31G X 8 MM     200 each    Use for insulin injections twice daily    Type 2 diabetes mellitus with diabetic autonomic neuropathy, with long-term current use of insulin (H)       latanoprost 0.005 % ophthalmic solution    XALATAN    2.5 mL    Apply 1 drop to eye daily Instill  1 drop into the affected eye(s) once daily as directed    Glaucoma of both eyes associated with underlying disease       liraglutide 18 MG/3ML soln    VICTOZA    27 mL    Inject 1.8 mg Subcutaneous daily    Type 2 diabetes mellitus with diabetic polyneuropathy, with long-term current use of insulin (H)       lisinopril-hydrochlorothiazide 20-12.5 MG per tablet    PRINZIDE/ZESTORETIC    180 tablet    Take 1 tablet by mouth daily    Essential hypertension with goal blood pressure less than 140/90       loratadine 10 MG tablet    CLARITIN    30 tablet    Take 1 tablet (10 mg) by mouth daily as needed for allergies    Seasonal allergic rhinitis, unspecified chronicity, unspecified trigger       pioglitazone 15 MG tablet    ACTOS    30 tablet    Take 1 tablet (15 mg) by mouth daily    Type 2 diabetes mellitus with diabetic polyneuropathy, with long-term current use of insulin (H)

## 2017-11-29 NOTE — PROGRESS NOTES
Patient presents with:  Diabetes  Medication Reconciliation    SUBJECTIVE:  DM  -never received the actos eprescribed last visit, has no changes to report, no symptoms or questions, but wants the med prescribed    OBJECTIVE:  /75 (BP Location: Right arm)  Pulse 72  Temp 97.6  F (36.4  C) (Oral)  Resp 16  Wt 154 lb (69.9 kg)  SpO2 98%  BMI 35.47 kg/m2  Gen: alert, oriented X 3, no acute distress, no sign of discomfort      ASSESSMENT/PLAN:  (E11.42,  Z79.4) Type 2 diabetes mellitus with diabetic polyneuropathy, with long-term current use of insulin (H)  Comment: Represcribe and start the daily actos  Plan: pioglitazone (ACTOS) 15 MG tablet          Again recheck 3 weeks after starting with checking sugars 3-4 X/day so can evaluate for hyperglycemia/hypoglycemia.

## 2017-11-30 ASSESSMENT — PATIENT HEALTH QUESTIONNAIRE - PHQ9: SUM OF ALL RESPONSES TO PHQ QUESTIONS 1-9: 9

## 2017-12-13 DIAGNOSIS — M05.742 RHEUMATOID ARTHRITIS INVOLVING BOTH HANDS WITH POSITIVE RHEUMATOID FACTOR (H): ICD-10-CM

## 2017-12-13 DIAGNOSIS — M05.741 RHEUMATOID ARTHRITIS INVOLVING BOTH HANDS WITH POSITIVE RHEUMATOID FACTOR (H): ICD-10-CM

## 2017-12-14 RX ORDER — ACETAMINOPHEN 325 MG/1
325-650 TABLET ORAL EVERY 4 HOURS PRN
Qty: 100 TABLET | Refills: 3 | Status: SHIPPED | OUTPATIENT
Start: 2017-12-14 | End: 2018-04-06

## 2017-12-20 ENCOUNTER — OFFICE VISIT (OUTPATIENT)
Dept: FAMILY MEDICINE | Facility: CLINIC | Age: 62
End: 2017-12-20

## 2017-12-20 VITALS
TEMPERATURE: 97.6 F | HEART RATE: 68 BPM | SYSTOLIC BLOOD PRESSURE: 162 MMHG | DIASTOLIC BLOOD PRESSURE: 77 MMHG | WEIGHT: 158 LBS | BODY MASS INDEX: 36.57 KG/M2 | OXYGEN SATURATION: 100 % | HEIGHT: 55 IN

## 2017-12-20 DIAGNOSIS — E11.42 TYPE 2 DIABETES MELLITUS WITH DIABETIC POLYNEUROPATHY, WITH LONG-TERM CURRENT USE OF INSULIN (H): Primary | ICD-10-CM

## 2017-12-20 DIAGNOSIS — Z79.4 TYPE 2 DIABETES MELLITUS WITH DIABETIC POLYNEUROPATHY, WITH LONG-TERM CURRENT USE OF INSULIN (H): Primary | ICD-10-CM

## 2017-12-20 NOTE — PROGRESS NOTES
"Patient presents with:  Recheck Medication: on new medcines.   Medication Reconciliation: completed but needs attention.     SUBJECTIVE:  DM  -now taking actos eprescribed last visit, noticed improvement and feels fine on this medication  -Ave glucose on monitor over last 14 days 114, has readings of 67-160s    OBJECTIVE:  /77  Pulse 68  Temp 97.6  F (36.4  C) (Oral)  Ht 4' 7.31\" (140.5 cm)  Wt 158 lb (71.7 kg)  SpO2 100%  BMI 36.31 kg/m2  Gen: alert, oriented X 3, no acute distress, no sign of discomfort  LUNGS: CTAB, no wheezing, no rales, no crackles, no accessory muscle use  COR: normal rate, regular rhythm and no murmurs, clicks, or gallops      ASSESSMENT/PLAN:  (E11.42,  Z79.4) Type 2 diabetes mellitus with diabetic polyneuropathy, with long-term current use of insulin (H)  Comment: continue the daily actos  Plan: pioglitazone (ACTOS) 15 MG tablet          Again recheck January to see if a1c improving, will also need to closely follow BMP.        "

## 2017-12-20 NOTE — MR AVS SNAPSHOT
"              After Visit Summary   12/20/2017    Omid Adorno    MRN: [id number]           Patient Information     Date Of Birth          1955        Visit Information        Provider Department      12/20/2017 4:20 PM Shannan Benson MD [facility]         Follow-ups after your visit        Who to contact     Please call your clinic at [phone] to:    Ask questions about your health    Make or cancel appointments    Discuss your medicines    Learn about your test results    Speak to your doctor   If you have compliments or concerns about an experience at your clinic, or if you wish to file a complaint, please contact [facility] Patient Relations at [phone] or email us at [email or web address]         Additional Information About Your Visit        Care EveryWhere ID     This is your Care EveryWhere ID. This could be used by other organizations to access your [place] medical records  [id number]        Your Vitals Were     Pulse Temperature Height Pulse Oximetry BMI (Body Mass Index)       68 97.6  F (36.4  C) (Oral) 4' 7.31\" (140.5 cm) 100% 36.31 kg/m2        Blood Pressure from Last 3 Encounters:   12/20/17 162/77   11/29/17 135/75   10/25/17 104/64    Weight from Last 3 Encounters:   12/20/17 158 lb (71.7 kg)   11/29/17 154 lb (69.9 kg)   10/25/17 151 lb 12.8 oz (68.9 kg)              Today, you had the following     No orders found for display         Today's Medication Changes          These changes are accurate as of: 12/20/17  5:07 PM.  If you have any questions, ask your nurse or doctor.               These medicines have changed or have updated prescriptions.        Dose/Directions    insulin glargine 100 UNIT/ML injection   Commonly known as:  LANTUS   This may have changed:  additional instructions   Used for:  Type 2 diabetes mellitus with diabetic polyneuropathy, with long-term current use of insulin (H)        70 units SQ in AM 50 units SQ " in PM   Quantity:  30 mL   Refills:  11         Stop taking these medicines if you haven't already. Please contact your care team if you have questions.     ammonium lactate 12 % cream   Commonly known as:  AMLACTIN   Stopped by:  Shannan Benson MD           glipiZIDE 5 MG tablet   Commonly known as:  GLUCOTROL   Stopped by:  Shannan Benson MD                    Primary Care Provider Office Phone # Fax #    Shannan Benson [phone]       [facility] [address] [place] [address]        Equal Access to Services     [facility]: Hadii aad ku hadasho Soomaali, waaxda luqadaha, qaybta kaalmada adeegyada, waxay cori hayprestonn valentina liao . So [facility] [phone].    ATENCIÓN: Si habla español, tiene a mcclure disposición servicios gratuitos de asistencia lingüística. [facility] [phone].    We comply with applicable federal civil rights laws and [place] laws. We do not discriminate on the basis of race, color, national origin, age, disability, sex, sexual orientation, or gender identity.            Thank you!     Thank you for choosing [facility]  for your care. Our goal is always to provide you with excellent care. Hearing back from our patients is one way we can continue to improve our services. Please take a few minutes to complete the written survey that you may receive in the mail after your visit with us. Thank you!             Your Updated Medication List - Protect others around you: Learn how to safely use, store and throw away your medicines at www.disposemymeds.org.          This list is accurate as of: 12/20/17  5:07 PM.  Always use your most recent med list.                   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 (H)        Alcohol Swabs Pads     100 each    Use as directed twice daily    Type 2 diabetes mellitus with other diabetic ophthalmic complication       aspirin 81 MG tablet     90 tablet    Take 1 tablet (81 mg) by mouth daily    Type 2 diabetes mellitus with diabetic polyneuropathy, unspecified long term insulin use status (H)       atorvastatin 40 MG tablet    LIPITOR    90 tablet    Take 1 tablet (40 mg) by mouth daily    Pure hypercholesterolemia       blood glucose monitoring lancets     200 each    Check sugars twice daily (okay to dispense preferred brand cover by insurance)    Type 2 diabetes mellitus with diabetic polyneuropathy, with long-term current use of insulin (H)       blood glucose monitoring meter device kit     1 kit    Check fasting glucose 2 day on insulin (okay to dispense preferred brand cover by insurance)    Type 2 diabetes mellitus with diabetic polyneuropathy, with long-term current use of insulin (H)       blood glucose monitoring test strip    KEYSHA CONTOUR    200 each    Check sugars 2 times daily (okay to dispense preferred brand cover by insurance)    Type 2 diabetes mellitus with diabetic polyneuropathy, with long-term current use of insulin (H)       capsaicin 0.025 % Crea cream    ZOSTRIX    180 g    Apply twice daily to affected skin    Type 2 diabetes mellitus with diabetic polyneuropathy (H)       cholecalciferol 1000 UNIT tablet    vitamin D3    100 tablet    Take 1 tablet (1,000 Units) by mouth daily    Chronic kidney disease, stage III (moderate)       insulin glargine 100 UNIT/ML injection    LANTUS    30 mL    70 units SQ in AM 50 units SQ in PM    Type 2 diabetes mellitus with diabetic polyneuropathy, with long-term current use of insulin (H)       insulin pen needle 31G X 8 MM     200 each    Use for insulin injections twice daily    Type 2 diabetes mellitus with diabetic autonomic neuropathy, with long-term current use of insulin (H)       latanoprost 0.005 % ophthalmic  solution    XALATAN    2.5 mL    Apply 1 drop to eye daily Instill  1 drop into the affected eye(s) once daily as directed    Glaucoma of both eyes associated with underlying disease       liraglutide 18 MG/3ML soln    VICTOZA    27 mL    Inject 1.8 mg Subcutaneous daily    Type 2 diabetes mellitus with diabetic polyneuropathy, with long-term current use of insulin (H)       lisinopril-hydrochlorothiazide 20-12.5 MG per tablet    PRINZIDE/ZESTORETIC    180 tablet    Take 1 tablet by mouth daily    Essential hypertension with goal blood pressure less than 140/90       loratadine 10 MG tablet    CLARITIN    30 tablet    Take 1 tablet (10 mg) by mouth daily as needed for allergies    Seasonal allergic rhinitis, unspecified chronicity, unspecified trigger       pioglitazone 15 MG tablet    ACTOS    30 tablet    Take 1 tablet (15 mg) by mouth daily    Type 2 diabetes mellitus with diabetic polyneuropathy, with long-term current use of insulin (H)

## 2017-12-20 NOTE — NURSING NOTE
Recent eye visit in October.   ~ Ezequiel HOOD CMA ([place])     name: Miguel Felix  Language: Hmong  Agency: [employer]  Phone number: [phone]

## 2018-01-03 ENCOUNTER — MEDICAL CORRESPONDENCE (OUTPATIENT)
Dept: HEALTH INFORMATION MANAGEMENT | Facility: CLINIC | Age: 63
End: 2018-01-03

## 2018-01-12 DIAGNOSIS — Z79.4 TYPE 2 DIABETES MELLITUS WITH DIABETIC POLYNEUROPATHY, WITH LONG-TERM CURRENT USE OF INSULIN (H): Primary | ICD-10-CM

## 2018-01-12 DIAGNOSIS — E11.42 TYPE 2 DIABETES MELLITUS WITH DIABETIC POLYNEUROPATHY, WITH LONG-TERM CURRENT USE OF INSULIN (H): Primary | ICD-10-CM

## 2018-01-12 RX ORDER — BLOOD-GLUCOSE METER
KIT MISCELLANEOUS
Qty: 1 KIT | Refills: 3 | Status: SHIPPED | OUTPATIENT
Start: 2018-01-12 | End: 2018-02-06

## 2018-02-06 ENCOUNTER — OFFICE VISIT (OUTPATIENT)
Dept: FAMILY MEDICINE | Facility: CLINIC | Age: 63
End: 2018-02-06

## 2018-02-06 VITALS
SYSTOLIC BLOOD PRESSURE: 165 MMHG | HEART RATE: 65 BPM | WEIGHT: 164.4 LBS | BODY MASS INDEX: 36.98 KG/M2 | HEIGHT: 56 IN | DIASTOLIC BLOOD PRESSURE: 74 MMHG | OXYGEN SATURATION: 99 % | TEMPERATURE: 97.6 F

## 2018-02-06 DIAGNOSIS — B18.1 HEPATITIS B CARRIER (H): ICD-10-CM

## 2018-02-06 DIAGNOSIS — J06.9 VIRAL URI WITH COUGH: ICD-10-CM

## 2018-02-06 DIAGNOSIS — Z79.4 TYPE 2 DIABETES MELLITUS WITH DIABETIC POLYNEUROPATHY, WITH LONG-TERM CURRENT USE OF INSULIN (H): Primary | ICD-10-CM

## 2018-02-06 DIAGNOSIS — E11.42 TYPE 2 DIABETES MELLITUS WITH DIABETIC POLYNEUROPATHY, WITH LONG-TERM CURRENT USE OF INSULIN (H): Primary | ICD-10-CM

## 2018-02-06 DIAGNOSIS — K21.9 GASTROESOPHAGEAL REFLUX DISEASE WITHOUT ESOPHAGITIS: ICD-10-CM

## 2018-02-06 LAB — HBA1C MFR BLD: 7.9 % (ref 4.1–5.7)

## 2018-02-06 RX ORDER — BLOOD-GLUCOSE METER
KIT MISCELLANEOUS
Qty: 1 KIT | Refills: 3 | COMMUNITY
Start: 2018-02-06 | End: 2021-11-11

## 2018-02-06 RX ORDER — GUAIFENESIN AND DEXTROMETHORPHAN HYDROBROMIDE 600; 30 MG/1; MG/1
1 TABLET, EXTENDED RELEASE ORAL EVERY 12 HOURS
Qty: 28 TABLET | Refills: 0 | Status: SHIPPED | OUTPATIENT
Start: 2018-02-06 | End: 2018-05-09

## 2018-02-06 RX ORDER — GLIPIZIDE 5 MG/1
15 TABLET ORAL
Qty: 360 TABLET | Refills: 3 | COMMUNITY
Start: 2018-02-06 | End: 2018-05-09 | Stop reason: DRUGHIGH

## 2018-02-06 RX ORDER — GLIPIZIDE 5 MG/1
10 TABLET ORAL
COMMUNITY
Start: 2018-02-06 | End: 2018-07-31

## 2018-02-06 NOTE — MR AVS SNAPSHOT
After Visit Summary   2/6/2018    Omid Adorno    MRN: [id number]           Patient Information     Date Of Birth          1955        Visit Information        Provider Department      2/6/2018 4:20 PM Shannan Benson MD [facility]        Today's Diagnoses     Type 2 diabetes mellitus with diabetic polyneuropathy, with long-term current use of insulin (H)    -  1      Care Instructions    - Start taking glipizide 5 mg tablets 3 times daily.  - If you are still getting low blood sugars (anything under 60), cut glipizide down to 2 tablets daily.  - Try to eat more protein to keep you feeling full longer.  - Start drinking caffeine-free tea, Camomile tea.    Your medication list is printed, please keep this with you, it is helpful to bring this current list to any other medical appointments, the emergency room or hospital.    If you had lab testing today and your results are reassuring or normal they will be be mailed to you within 7 days.     If the lab tests need quick action we will call you with the results.   The phone number we will call with results is # [phone] (home) . If this is not the best number please call our clinic and change the number.    If you need any refills please call your pharmacy and they will contact us.    If you have any further concerns or wish to schedule another appointment you must call our office during normal business hours  [phone] (8-5:00 M-F)  If you have urgent medical questions that cannot wait  you may also call [phone] at any time of day.  If you have a medical emergency please call [phone].    Thank you for coming to [facility].            Follow-ups after your visit        Who to contact     Please call your clinic at [phone] to:    Ask questions about your health    Make or cancel appointments    Discuss your medicines    Learn about your test results    Speak to your doctor   If you have compliments or concerns about  "an experience at your clinic, or if you wish to file a complaint, please contact [facility] Patient Relations at [phone] or email us at [email or web address]         Additional Information About Your Visit        [employer] Information     [employer] is an electronic gateway that provides easy, online access to your medical records. With [employer], you can request a clinic appointment, read your test results, renew a prescription or communicate with your care team.     To sign up for [employer] visit the website at [email or web address]   You will be asked to enter the access code listed below, as well as some personal information. Please follow the directions to create your username and password.     Your access code is: [email or web address]  Expires: 2018  5:17 PM     Your access code will  in 90 days. If you need help or a new code, please contact your [facility] or call [phone] for assistance.        Care EveryWhere ID     This is your Care EveryWhere ID. This could be used by other organizations to access your [place] medical records  [id number]        Your Vitals Were     Pulse Temperature Height Pulse Oximetry BMI (Body Mass Index)       65 97.6  F (36.4  C) (Oral) 4' 7.75\" (141.6 cm) 99% 37.19 kg/m2        Blood Pressure from Last 3 Encounters:   18 165/74   17 162/77   17 135/75    Weight from Last 3 Encounters:   18 164 lb 6.4 oz (74.6 kg)   17 158 lb (71.7 kg)   17 154 lb (69.9 kg)              We Performed the Following     Hemoglobin A1c ([facility])          Today's Medication Changes          These changes are accurate as of 18  5:17 PM.  If you have any questions, ask your nurse or doctor.               These medicines have changed or have updated prescriptions.        Dose/Directions    insulin glargine 100 UNIT/ML injection   Commonly known as:  LANTUS   This may have changed:  " additional instructions   Used for:  Type 2 diabetes mellitus with diabetic polyneuropathy, with long-term current use of insulin (H)        70 units SQ in AM 50 units SQ in PM   Quantity:  30 mL   Refills:  11         Stop taking these medicines if you haven't already. Please contact your care team if you have questions.     capsaicin 0.025 % Crea cream   Commonly known as:  ZOSTRIX   Stopped by:  Shannan Benson MD                    Primary Care Provider Office Phone # Fax #    Shannan Benson [phone]       [facility] [address] [place] [address]        Equal Access to Services     [facility]: Hadii aad ku hadasho Soomaali, waaxda luqadaha, qaybta kaalmada adeegyada, kyle persaud haycharlene liao . So [facility] [phone].    ATENCIÓN: Si habla español, tiene a mcclure disposición servicios gratuitos de asistencia lingüística. [facility] [phone].    We comply with applicable federal civil rights laws and [place] laws. We do not discriminate on the basis of race, color, national origin, age, disability, sex, sexual orientation, or gender identity.            Thank you!     Thank you for choosing [facility]  for your care. Our goal is always to provide you with excellent care. Hearing back from our patients is one way we can continue to improve our services. Please take a few minutes to complete the written survey that you may receive in the mail after your visit with us. Thank you!             Your Updated Medication List - Protect others around you: Learn how to safely use, store and throw away your medicines at www.disposemymeds.org.          This list is accurate as of 2/6/18  5:17 PM.  Always use your most recent med list.                   Brand Name Dispense Instructions for use Diagnosis    acetaminophen 325 MG tablet    TYLENOL    100 tablet    Take 1-2 tablets (325-650 mg) by mouth every 4 hours as needed for mild pain    Rheumatoid  arthritis involving both hands with positive rheumatoid factor (H)       Alcohol Swabs Pads     100 each    Use as directed twice daily    Type 2 diabetes mellitus with other diabetic ophthalmic complication       aspirin 81 MG tablet     90 tablet    Take 1 tablet (81 mg) by mouth daily    Type 2 diabetes mellitus with diabetic polyneuropathy, unspecified long term insulin use status (H)       atorvastatin 40 MG tablet    LIPITOR    90 tablet    Take 1 tablet (40 mg) by mouth daily    Pure hypercholesterolemia       blood glucose monitoring lancets     100 each    Use to test blood sugars 2 times daily or as directed.    Type 2 diabetes mellitus with diabetic polyneuropathy, with long-term current use of insulin (H)       blood glucose monitoring meter device kit     1 kit    Use to test blood sugars 2 times daily or as directed.    Type 2 diabetes mellitus with diabetic polyneuropathy, with long-term current use of insulin (H)       cholecalciferol 1000 UNIT tablet    vitamin D3    100 tablet    Take 1 tablet (1,000 Units) by mouth daily    Chronic kidney disease, stage III (moderate)       glipiZIDE 5 MG tablet    GLUCOTROL     Take 4 tablets (20 mg) by mouth every morning (before breakfast)    Type 2 diabetes mellitus with diabetic polyneuropathy, with long-term current use of insulin (H)       insulin glargine 100 UNIT/ML injection    LANTUS    30 mL    70 units SQ in AM 50 units SQ in PM    Type 2 diabetes mellitus with diabetic polyneuropathy, with long-term current use of insulin (H)       insulin pen needle 31G X 8 MM     200 each    Use for insulin injections twice daily    Type 2 diabetes mellitus with diabetic autonomic neuropathy, with long-term current use of insulin (H)       latanoprost 0.005 % ophthalmic solution    XALATAN    2.5 mL    Apply 1 drop to eye daily Instill  1 drop into the affected eye(s) once daily as directed    Glaucoma of both eyes associated with underlying disease        liraglutide 18 MG/3ML soln    VICTOZA    27 mL    Inject 1.8 mg Subcutaneous daily    Type 2 diabetes mellitus with diabetic polyneuropathy, with long-term current use of insulin (H)       lisinopril-hydrochlorothiazide 20-12.5 MG per tablet    PRINZIDE/ZESTORETIC    180 tablet    Take 1 tablet by mouth daily    Essential hypertension with goal blood pressure less than 140/90       loratadine 10 MG tablet    CLARITIN    30 tablet    Take 1 tablet (10 mg) by mouth daily as needed for allergies    Seasonal allergic rhinitis, unspecified chronicity, unspecified trigger       ONETOUCH ULTRA test strip   Generic drug:  blood glucose monitoring     100 strip    Use to test blood sugars 2 times daily or as directed.    Type 2 diabetes mellitus with diabetic polyneuropathy, with long-term current use of insulin (H)       pioglitazone 15 MG tablet    ACTOS    30 tablet    Take 1 tablet (15 mg) by mouth daily    Type 2 diabetes mellitus with diabetic polyneuropathy, with long-term current use of insulin (H)

## 2018-02-06 NOTE — PROGRESS NOTES
"Patient presents with:  Diabetes  Cough: x1 week, no fevers, headache, sore throatv, nasal congestion  Medication Reconciliation: needs attention, is pt suppose to be taking glipizide, not on list.    SUBJECTIVE:  DM  -feels maybe like the sugars are too low has 14 day ave of 89  -sugar meter has       2.  Cough: worse at night, X 7 days, not improving  -throat is sore, does have a headache, denies ear pains  -taking tylenol    3.  Stomach pain: feels like ulcer pain X 2 weeks  -doesn't think she's ever been diagnosed with that  -rarely has issues with heartburn    ROS:  GEN: denies fevers, chills  LUNGS: feels wheezy wheeze, cough  COR: no chest pain, palpitations, PND  GI: denies changes to bowels, no N/V    OBJECTIVE:  /74  Pulse 65  Temp 97.6  F (36.4  C) (Oral)  Ht 4' 7.75\" (141.6 cm)  Wt 164 lb 6.4 oz (74.6 kg)  SpO2 99%  BMI 37.19 kg/m2  Gen: alert, oriented X 3, no acute distress, no sign of discomfort  LUNGS: CTAB, faint wheezing with cough, no rales, no crackles, no accessory muscle use  COR: normal rate, regular rhythm and no murmurs, clicks, or gallops  -lower extremities : no edema  ABD:tenderness epigastric and bowel sounds normal      Lab Results   Component Value Date    A1C 7.9 02/06/2018    A1C 10.0 10/25/2017    A1C 9.6 07/19/2017    A1C 9.4 03/08/2017    A1C 10.3 11/30/2016      ASSESSMENT/PLAN:  (E11.42,  Z79.4) Type 2 diabetes mellitus with diabetic polyneuropathy, with long-term current use of insulin (H)  (primary encounter diagnosis)  Comment: controlled with a1c now in goal of <8  Plan: Hemoglobin A1c ([facility]), glipiZIDE (GLUCOTROL)         5 MG tablet, blood glucose monitoring (ONE         TOUCH ULTRA MINI) meter device kit, blood         glucose monitoring (ONETOUCH ULTRA) test strip,        blood glucose monitoring (ONE TOUCH DELICA)         lancets, glipiZIDE (GLUCOTROL) 5 MG tablet        Hypoglycemic levels so will reduce glucotrol from 20 daily to 15 daily and " monitor sugars, if sugars still <60 then reduce to 10 mg daily    (J06.9,  B97.89) Viral URI with cough  Comment: not wanting syrup, no sign or symp consistent with pneumonia or flu  Plan: Dextromethorphan-Guaifenesin (MUCINEX DM)          MG per 12 hr tablet        Symptomatic treatment     (K21.9) Gastroesophageal reflux disease without esophagitis  Comment: no known hx of ulcer dz  Plan: ranitidine (ZANTAC) 150 MG tablet        Trial of med, consider recheck if not improving within 1 month    (B18.1) Hepatitis B carrier (H)  Comment: reviewed old records and pt with known high viral load and refusing hep b treatment  Plan: have been unaware of hep b load, at next visit, check hepatic levels, hep B viral quant and in need of screening liver u/s.

## 2018-03-23 DIAGNOSIS — Z79.4 TYPE 2 DIABETES MELLITUS WITH DIABETIC AUTONOMIC NEUROPATHY, WITH LONG-TERM CURRENT USE OF INSULIN (H): ICD-10-CM

## 2018-03-23 DIAGNOSIS — E11.43 TYPE 2 DIABETES MELLITUS WITH DIABETIC AUTONOMIC NEUROPATHY, WITH LONG-TERM CURRENT USE OF INSULIN (H): ICD-10-CM

## 2018-04-04 DIAGNOSIS — Z79.4 TYPE 2 DIABETES MELLITUS WITH DIABETIC POLYNEUROPATHY, WITH LONG-TERM CURRENT USE OF INSULIN (H): ICD-10-CM

## 2018-04-04 DIAGNOSIS — E11.42 TYPE 2 DIABETES MELLITUS WITH DIABETIC POLYNEUROPATHY, WITH LONG-TERM CURRENT USE OF INSULIN (H): ICD-10-CM

## 2018-04-04 RX ORDER — INSULIN GLARGINE 100 [IU]/ML
INJECTION, SOLUTION SUBCUTANEOUS
Qty: 30 ML | Refills: 3 | Status: SHIPPED | OUTPATIENT
Start: 2018-04-04 | End: 2018-05-09

## 2018-04-06 DIAGNOSIS — M05.741 RHEUMATOID ARTHRITIS INVOLVING BOTH HANDS WITH POSITIVE RHEUMATOID FACTOR (H): ICD-10-CM

## 2018-04-06 DIAGNOSIS — M05.742 RHEUMATOID ARTHRITIS INVOLVING BOTH HANDS WITH POSITIVE RHEUMATOID FACTOR (H): ICD-10-CM

## 2018-04-06 DIAGNOSIS — K21.9 GASTROESOPHAGEAL REFLUX DISEASE WITHOUT ESOPHAGITIS: ICD-10-CM

## 2018-04-06 RX ORDER — ACETAMINOPHEN 325 MG/1
325-650 TABLET ORAL EVERY 4 HOURS PRN
Qty: 100 TABLET | Refills: 3 | Status: SHIPPED | OUTPATIENT
Start: 2018-04-06 | End: 2018-11-07

## 2018-05-09 ENCOUNTER — OFFICE VISIT (OUTPATIENT)
Dept: FAMILY MEDICINE | Facility: CLINIC | Age: 63
End: 2018-05-09

## 2018-05-09 VITALS
WEIGHT: 158.6 LBS | SYSTOLIC BLOOD PRESSURE: 133 MMHG | HEIGHT: 55 IN | BODY MASS INDEX: 36.7 KG/M2 | OXYGEN SATURATION: 99 % | TEMPERATURE: 97.5 F | HEART RATE: 65 BPM | DIASTOLIC BLOOD PRESSURE: 78 MMHG

## 2018-05-09 DIAGNOSIS — E11.42 TYPE 2 DIABETES MELLITUS WITH DIABETIC POLYNEUROPATHY, WITH LONG-TERM CURRENT USE OF INSULIN (H): ICD-10-CM

## 2018-05-09 DIAGNOSIS — Z79.4 TYPE 2 DIABETES MELLITUS WITH DIABETIC POLYNEUROPATHY, WITH LONG-TERM CURRENT USE OF INSULIN (H): ICD-10-CM

## 2018-05-09 DIAGNOSIS — B18.1 CHRONIC HEPATITIS B VIRUS INFECTION (H): Primary | ICD-10-CM

## 2018-05-09 LAB
ALBUMIN SERPL BCP-MCNC: 3.1 G/DL (ref 3.5–5)
ALP SERPL-CCNC: 136 U/L (ref 45–120)
ALT SERPL W/O P-5'-P-CCNC: 57 U/L (ref 0–45)
AST SERPL-CCNC: 48 U/L (ref 0–40)
BILIRUB DIRECT SERPL-MCNC: 0.1 MG/DL (ref 0–0.5)
BILIRUB SERPL-MCNC: 0.3 MG/DL (ref 0–1)
HBA1C MFR BLD: 7.8 % (ref 4.1–5.7)
PROT SERPL-MCNC: 7.5 G/DL (ref 6–8)

## 2018-05-09 RX ORDER — INSULIN GLARGINE 100 [IU]/ML
INJECTION, SOLUTION SUBCUTANEOUS
Qty: 45 ML | Refills: 3 | Status: SHIPPED | OUTPATIENT
Start: 2018-05-09 | End: 2018-11-12

## 2018-05-09 NOTE — MR AVS SNAPSHOT
After Visit Summary   5/9/2018    Omid Adorno    MRN: [id number]           Patient Information     Date Of Birth          1955        Visit Information        Provider Department      5/9/2018 4:20 PM Shannan Benson MD [facility]        Today's Diagnoses     Chronic hepatitis B virus infection (H)    -  1    Type 2 diabetes mellitus with diabetic polyneuropathy, with long-term current use of insulin (H)          Care Instructions    - Take loratadine for coughing and itchy throat.    Your medication list is printed, please keep this with you, it is helpful to bring this current list to any other medical appointments, the emergency room or hospital.    If you had lab testing today and your results are reassuring or normal they will be be mailed to you within 7 days.     If the lab tests need quick action we will call you with the results.   The phone number we will call with results is # [phone] (home) . If this is not the best number please call our clinic and change the number.    If you need any refills please call your pharmacy and they will contact us.    If you have any further concerns or wish to schedule another appointment you must call our office during normal business hours  [phone] (8-5:00 M-F)  If you have urgent medical questions that cannot wait  you may also call [phone] at any time of day.  If you have a medical emergency please call [phone].    Thank you for coming to [facility].            Follow-ups after your visit        Who to contact     Please call your clinic at [phone] to:    Ask questions about your health    Make or cancel appointments    Discuss your medicines    Learn about your test results    Speak to your doctor            Additional Information About Your Visit        [employer] Information     [employer] is an electronic gateway that provides easy, online access to your medical records. With [employer], you can request a clinic  "appointment, read your test results, renew a prescription or communicate with your care team.     To sign up for [employer] visit the website at [email or web address]   You will be asked to enter the access code listed below, as well as some personal information. Please follow the directions to create your username and password.     Your access code is: [email or web address]  Expires: 2018  5:00 PM     Your access code will  in 90 days. If you need help or a new code, please contact your [facility] or call [phone] for assistance.        Care EveryWhere ID     This is your Care EveryWhere ID. This could be used by other organizations to access your [place] medical records  [id number]        Your Vitals Were     Pulse Temperature Height Pulse Oximetry BMI (Body Mass Index)       65 97.5  F (36.4  C) (Oral) 4' 7.25\" (140.3 cm) 99% 36.53 kg/m2        Blood Pressure from Last 3 Encounters:   18 133/78   18 165/74   17 162/77    Weight from Last 3 Encounters:   18 158 lb 9.6 oz (71.9 kg)   18 164 lb 6.4 oz (74.6 kg)   17 158 lb (71.7 kg)              We Performed the Following     Hemoglobin A1c ([facility])     Hepatic Profile ([facility])     Hepatitis B DNA Detect and Quant ([facility])          Today's Medication Changes          These changes are accurate as of 18  5:00 PM.  If you have any questions, ask your nurse or doctor.               These medicines have changed or have updated prescriptions.        Dose/Directions    glipiZIDE 5 MG tablet   Commonly known as:  GLUCOTROL   This may have changed:  Another medication with the same name was removed. Continue taking this medication, and follow the directions you see here.   Used for:  Type 2 diabetes mellitus with diabetic polyneuropathy, with long-term current use of insulin (H)   Changed by:  Shannan Benson MD        Dose:  10 mg   Take 10 mg by mouth every morning (before " breakfast)   Refills:  0         Stop taking these medicines if you haven't already. Please contact your care team if you have questions.     MUCINEX DM  MG per 12 hr tablet   Stopped by:  Shannan Benson MD                Where to get your medicines      These medications were sent to [facility]/pharmacy #[id number] - [place], [place] - [address]  [address], [place] [address]     Phone:  [phone]     BASAGLAR 100 UNIT/ML injection                Primary Care Provider Office Phone # Fax #    Shannan Benson [phone] [address] [address]        Equal Access to Services     [facility]: Hadii anselmo moore hadregiso Solester, waaxda luqadaha, qaybta kaalmada aderhinayada, kyle liao . So [facility] [phone].    ATENCIÓN: Si habla español, tiene a mcclure disposición servicios gratuitos de asistencia lingüística. Llame al [phone].    We comply with applicable federal civil rights laws and [place] laws. We do not discriminate on the basis of race, color, national origin, age, disability, sex, sexual orientation, or gender identity.            Thank you!     Thank you for choosing [facility]  for your care. Our goal is always to provide you with excellent care. Hearing back from our patients is one way we can continue to improve our services. Please take a few minutes to complete the written survey that you may receive in the mail after your visit with us. Thank you!             Your Updated Medication List - Protect others around you: Learn how to safely use, store and throw away your medicines at www.disposemymeds.org.          This list is accurate as of 5/9/18  5:00 PM.  Always use your most recent med list.                   Brand Name Dispense Instructions for use Diagnosis    acetaminophen 325 MG tablet    TYLENOL    100 tablet    Take 1-2 tablets (325-650 mg) by mouth every 4 hours as needed for mild pain     Rheumatoid arthritis involving both hands with positive rheumatoid factor (H)       aspirin 81 MG tablet     90 tablet    Take 1 tablet (81 mg) by mouth daily    Type 2 diabetes mellitus with diabetic polyneuropathy, unspecified long term insulin use status (H)       atorvastatin 40 MG tablet    LIPITOR    90 tablet    Take 1 tablet (40 mg) by mouth daily    Pure hypercholesterolemia       BASAGLAR 100 UNIT/ML injection     45 mL    Inject 70 units SQ in AM and 54 units SQ in PM    Type 2 diabetes mellitus with diabetic polyneuropathy, with long-term current use of insulin (H)       blood glucose monitoring lancets     100 each    Use to test blood sugars 2 times daily or as directed.    Type 2 diabetes mellitus with diabetic polyneuropathy, with long-term current use of insulin (H)       blood glucose monitoring meter device kit     1 kit    Use to test blood sugars 2 times daily or as directed.    Type 2 diabetes mellitus with diabetic polyneuropathy, with long-term current use of insulin (H)       cholecalciferol 1000 UNIT tablet    vitamin D3    100 tablet    Take 1 tablet (1,000 Units) by mouth daily    Chronic kidney disease, stage III (moderate)       glipiZIDE 5 MG tablet    GLUCOTROL     Take 10 mg by mouth every morning (before breakfast)    Type 2 diabetes mellitus with diabetic polyneuropathy, with long-term current use of insulin (H)       insulin pen needle 31G X 8 MM     200 each    Use for insulin injections twice daily    Type 2 diabetes mellitus with diabetic autonomic neuropathy, with long-term current use of insulin (H)       latanoprost 0.005 % ophthalmic solution    XALATAN    2.5 mL    Apply 1 drop to eye daily Instill  1 drop into the affected eye(s) once daily as directed    Glaucoma of both eyes associated with underlying disease       liraglutide 18 MG/3ML soln    VICTOZA    27 mL    Inject 1.8 mg Subcutaneous daily    Type 2 diabetes mellitus with diabetic polyneuropathy, with long-term  current use of insulin (H)       lisinopril-hydrochlorothiazide 20-12.5 MG per tablet    PRINZIDE/ZESTORETIC    180 tablet    Take 1 tablet by mouth daily    Essential hypertension with goal blood pressure less than 140/90       loratadine 10 MG tablet    CLARITIN    30 tablet    Take 1 tablet (10 mg) by mouth daily as needed for allergies    Seasonal allergic rhinitis, unspecified chronicity, unspecified trigger       ONETOUCH ULTRA test strip   Generic drug:  blood glucose monitoring     100 strip    Use to test blood sugars 2 times daily or as directed.    Type 2 diabetes mellitus with diabetic polyneuropathy, with long-term current use of insulin (H)       pioglitazone 15 MG tablet    ACTOS    30 tablet    Take 1 tablet (15 mg) by mouth daily    Type 2 diabetes mellitus with diabetic polyneuropathy, with long-term current use of insulin (H)       ranitidine 150 MG tablet    ZANTAC    60 tablet    Take 1 tablet (150 mg) by mouth 2 times daily as needed for heartburn    Gastroesophageal reflux disease without esophagitis

## 2018-05-09 NOTE — PATIENT INSTRUCTIONS
- Take loratadine for coughing and itchy throat.    Your medication list is printed, please keep this with you, it is helpful to bring this current list to any other medical appointments, the emergency room or hospital.    If you had lab testing today and your results are reassuring or normal they will be be mailed to you within 7 days.     If the lab tests need quick action we will call you with the results.   The phone number we will call with results is # [phone] (home) . If this is not the best number please call our clinic and change the number.    If you need any refills please call your pharmacy and they will contact us.    If you have any further concerns or wish to schedule another appointment you must call our office during normal business hours  [phone] (8-5:00 M-F)  If you have urgent medical questions that cannot wait  you may also call [phone] at any time of day.  If you have a medical emergency please call [phone].    Thank you for coming to [facility].

## 2018-05-09 NOTE — PROGRESS NOTES
"Patient presents with:  Cough  Blood Draw  Medication Reconciliation: ran out of basaglar, gor refilled today but will run out again this month.    SUBJECTIVE:  DM  -feels maybe like the sugars are too low has 14 day ave of 89  -sugar meter has   -running out of her insulin pen, only gets 10 pens and doesn't last the month  -patient tries to be compliant with meds    2.  CHronic Hep B  -can't recall treatment, agrees to testing, unaware of any past treatment or liver issues, denies abdominal pain      ROS:  GEN: denies fevers, chills  LUNGS:slight cough, nocturnal  COR: no chest pain, palpitations, PND  GI: denies changes to bowels, no N/V    OBJECTIVE:  /78  Pulse 65  Temp 97.5  F (36.4  C) (Oral)  Ht 4' 7.25\" (140.3 cm)  Wt 158 lb 9.6 oz (71.9 kg)  SpO2 99%  BMI 36.53 kg/m2  Gen: alert, oriented X 3, no acute distress, no sign of discomfort  LUNGS: CTAB, faint wheezing with cough, no rales, no crackles, no accessory muscle use  COR: normal rate, regular rhythm and no murmurs, clicks, or gallops  -lower extremities : no edema  ABD:no HSM, nontender, normal bowel sounds    Lab Results   Component Value Date    A1C 7.9 02/06/2018    A1C 10.0 10/25/2017    A1C 9.6 07/19/2017    A1C 9.4 03/08/2017    A1C 10.3 11/30/2016      ASSESSMENT/PLAN:  (B18.1) Chronic hepatitis B virus infection (H)  (primary encounter diagnosis)  Comment: will check and consider starting treatment  Plan: Hepatitis B DNA Detect and Quant ([facility]),         Hepatic Profile ([facility])        Patient agreeing    (E11.42,  Z79.4) Type 2 diabetes mellitus with diabetic polyneuropathy, with long-term current use of insulin (H)  Comment: Patient agrees to continue same meds  Plan: Hemoglobin A1c ([facility]), BASAGLAR 100 UNIT/ML         injection        Congratulated on great control, discussed healthy habits                  "

## 2018-05-11 LAB
HEP B VIRUS DNA QUANT IU/ML: ABNORMAL [IU]/ML
HEP B VIRUS DNA QUANT LOG IU/ML: 4.8 {LOG_IU}/ML

## 2018-05-11 NOTE — PROGRESS NOTES
Call patient:    Do have persistent hepatitis B virus.  I would like you to see a liver specialist to discuss treating this virus.  Will arrange for referral to [facility].  Excellent DM care.

## 2018-05-16 ENCOUNTER — TRANSFERRED RECORDS (OUTPATIENT)
Dept: HEALTH INFORMATION MANAGEMENT | Facility: CLINIC | Age: 63
End: 2018-05-16

## 2018-05-16 ENCOUNTER — TELEPHONE (OUTPATIENT)
Dept: FAMILY MEDICINE | Facility: CLINIC | Age: 63
End: 2018-05-16

## 2018-05-16 NOTE — TELEPHONE ENCOUNTER
Thanked Parul for looking into this. Call patient and informed her of message from Parul. Pt understood and will request for refills when near due.

## 2018-06-01 DIAGNOSIS — E11.42 TYPE 2 DIABETES MELLITUS WITH DIABETIC POLYNEUROPATHY, WITH LONG-TERM CURRENT USE OF INSULIN (H): ICD-10-CM

## 2018-06-01 DIAGNOSIS — Z79.4 TYPE 2 DIABETES MELLITUS WITH DIABETIC POLYNEUROPATHY, WITH LONG-TERM CURRENT USE OF INSULIN (H): ICD-10-CM

## 2018-06-14 ENCOUNTER — TELEPHONE (OUTPATIENT)
Dept: FAMILY MEDICINE | Facility: CLINIC | Age: 63
End: 2018-06-14

## 2018-06-14 NOTE — TELEPHONE ENCOUNTER
I got a call from the pt, pt called to inform me that her ride did not show up yet.  I checked the pt file, and it does not show the pt has any appointment here.  The pt stated that she was referred somewhere else, and they called her about it.  Pt said that they told her that they were suppose to setup a ride for the pt, since she did not know where she is suppose to go.  I told the pt that I will call [facility] and see if any transportation is setup, and give her a call back.      I called [facility] to find out about the pt ride.  They informed me that there is no ride that has been setup for today.      I called the pt back and informed her that there is not ride setup for today for her.  She stated that they were suppose to.  I told her to call and reschedule the appointment, and I can help setup the ride.  The pt stated that she will come in and have the  help her.

## 2018-06-27 ENCOUNTER — OFFICE VISIT (OUTPATIENT)
Dept: FAMILY MEDICINE | Facility: CLINIC | Age: 63
End: 2018-06-27

## 2018-06-27 VITALS
DIASTOLIC BLOOD PRESSURE: 84 MMHG | OXYGEN SATURATION: 98 % | BODY MASS INDEX: 37.08 KG/M2 | WEIGHT: 161 LBS | HEART RATE: 65 BPM | TEMPERATURE: 97.7 F | SYSTOLIC BLOOD PRESSURE: 129 MMHG

## 2018-06-27 DIAGNOSIS — I10 ESSENTIAL HYPERTENSION: ICD-10-CM

## 2018-06-27 DIAGNOSIS — B18.1 VIRAL HEPATITIS B CHRONIC (H): ICD-10-CM

## 2018-06-27 DIAGNOSIS — R05.3 PERSISTENT DRY COUGH: Primary | ICD-10-CM

## 2018-06-27 RX ORDER — LOSARTAN POTASSIUM AND HYDROCHLOROTHIAZIDE 12.5; 1 MG/1; MG/1
1 TABLET ORAL DAILY
Qty: 30 TABLET | Refills: 1 | Status: SHIPPED | OUTPATIENT
Start: 2018-06-27 | End: 2018-08-21

## 2018-06-27 NOTE — PROGRESS NOTES
SUBJECTIVE:  1. Bad cough  Patient presents with:  Cough: for about 2 months now. Was prescribed to take allergy medications but not helping. Cough is not all the time.   Medication Reconciliation: not completed-- patient did not bring in meds today.   -initially thought med was working but came back  -denies issues with nose congestion/itching/running  -does feel itching in the throat and causes this dry cough  -denies having issus with throat clearing,  -not predicted by eating/sleeping/time of day    2. Hep B chronic  -missed appt due to ride, and needs new referral to [facility] not [facility]    OBJECTIVE:  /84  Pulse 65  Temp 97.7  F (36.5  C) (Oral)  Wt 161 lb (73 kg)  SpO2 98%  BMI 37.08 kg/m2  LUNGS: CTAB, no wheezing, no rales, no crackles, no accessory muscle use  COR: normal rate, regular rhythm and no murmurs, clicks, or gallops  -lower extremities : no edema       ASSESSMENT/PLAN:  (R05) Persistent dry cough  (primary encounter diagnosis)  Comment: initially tried from postnasal/irritation possibly GERD via meds including antihistamines, now appears more consistent with ACE side effect  Plan: stop lisinopril and try new med for BP control: ARB/HCTZ and see if improves, may need to switch off ARB if doesn't improve    (I10) Essential hypertension  Comment: see above  Plan: losartan-hydrochlorothiazide (HYZAAR) 100-12.5         MG per tablet        Stop prinzide switch to ARB/HCTZ combo    (B18.1) Viral hepatitis B chronic (H)  Comment: ongoing, but without liver enzyme abnormality  Plan: GASTROENTEROLOGY ADULT REF CONSULT ONLY        Placed referral will also need ride to appt

## 2018-06-27 NOTE — MR AVS SNAPSHOT
After Visit Summary   6/27/2018    Omid Adorno    MRN: [id number]           Patient Information     Date Of Birth          1955        Visit Information        Provider Department      6/27/2018 4:20 PM Shannan Benson MD [facility]        Today's Diagnoses     Persistent dry cough    -  1    Essential hypertension          Care Instructions      STOP taking Lisinopril as of today to see if this is the cause of your cough.     START taking new medicine called Losartan/HCTZ once daily.     Continue with your allergy medication as needed if you need it.       Your medication list is printed, please keep this with you, it is helpful to bring this current list to any other medical appointments, the emergency room or hospital.    If you had lab testing today and your results are reassuring or normal they will be be mailed to you within 7 days.     If the lab tests need quick action we will call you with the results.   The phone number we will call with results is # [phone] (home) . If this is not the best number please call our clinic and change the number.    If you need any refills please call your pharmacy and they will contact us.    If you have any further concerns or wish to schedule another appointment you must call our office during normal business hours  [phone] (8-5:00 M-F)  If you have urgent medical questions that cannot wait  you may also call [phone] at any time of day.  If you have a medical emergency please call [phone].    Thank you for coming to [facility].            Follow-ups after your visit        Who to contact     Please call your clinic at [phone] to:    Ask questions about your health    Make or cancel appointments    Discuss your medicines    Learn about your test results    Speak to your doctor            Additional Information About Your Visit        Care EveryWhere ID     This is your Care EveryWhere ID. This could be used by other  organizations to access your [place] medical records  [id number]        Your Vitals Were     Pulse Temperature Pulse Oximetry BMI (Body Mass Index)          65 97.7  F (36.5  C) (Oral) 98% 37.08 kg/m2         Blood Pressure from Last 3 Encounters:   06/27/18 129/84   05/09/18 133/78   02/06/18 165/74    Weight from Last 3 Encounters:   06/27/18 161 lb (73 kg)   05/09/18 158 lb 9.6 oz (71.9 kg)   02/06/18 164 lb 6.4 oz (74.6 kg)              Today, you had the following     No orders found for display         Today's Medication Changes          These changes are accurate as of 6/27/18  5:08 PM.  If you have any questions, ask your nurse or doctor.               Start taking these medicines.        Dose/Directions    losartan-hydrochlorothiazide 100-12.5 MG per tablet   Commonly known as:  HYZAAR   Used for:  Essential hypertension   Started by:  Shannan Benson MD        Dose:  1 tablet   Take 1 tablet by mouth daily   Quantity:  30 tablet   Refills:  1         Stop taking these medicines if you haven't already. Please contact your care team if you have questions.     lisinopril-hydrochlorothiazide 20-12.5 MG per tablet   Commonly known as:  PRINZIDE/ZESTORETIC   Stopped by:  Shannan Benson MD                Where to get your medicines      These medications were sent to [facility]/pharmacy #[id number] - [place], [place] - [address], [place] [address]     Phone:  [phone]     losartan-hydrochlorothiazide 100-12.5 MG per tablet                Primary Care Provider Office Phone # Fax #    Shannan Benson [phone]       [address] [place] [address]        Equal Access to Services     LILY JULIEN AH: Alise Allen, millie joseph, citlaly kaalmada hina, kyle soria. So [facility] [phone].    ATENCIÓN: Si habla español, tiene a mcclure disposición servicios gratuitos de asistencia lingüística. Llame al  [phone].    We comply with applicable federal civil rights laws and [place] laws. We do not discriminate on the basis of race, color, national origin, age, disability, sex, sexual orientation, or gender identity.            Thank you!     Thank you for choosing [facility]  for your care. Our goal is always to provide you with excellent care. Hearing back from our patients is one way we can continue to improve our services. Please take a few minutes to complete the written survey that you may receive in the mail after your visit with us. Thank you!             Your Updated Medication List - Protect others around you: Learn how to safely use, store and throw away your medicines at www.disposemymeds.org.          This list is accurate as of 6/27/18  5:08 PM.  Always use your most recent med list.                   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 (H)       aspirin 81 MG tablet     90 tablet    Take 1 tablet (81 mg) by mouth daily    Type 2 diabetes mellitus with diabetic polyneuropathy, unspecified long term insulin use status (H)       atorvastatin 40 MG tablet    LIPITOR    90 tablet    Take 1 tablet (40 mg) by mouth daily    Pure hypercholesterolemia       BASAGLAR 100 UNIT/ML injection     45 mL    Inject 70 units SQ in AM and 54 units SQ in PM    Type 2 diabetes mellitus with diabetic polyneuropathy, with long-term current use of insulin (H)       blood glucose monitoring lancets     100 each    Use to test blood sugars 2 times daily or as directed.    Type 2 diabetes mellitus with diabetic polyneuropathy, with long-term current use of insulin (H)       blood glucose monitoring meter device kit     1 kit    Use to test blood sugars 2 times daily or as directed.    Type  2 diabetes mellitus with diabetic polyneuropathy, with long-term current use of insulin (H)       cholecalciferol 1000 UNIT tablet    vitamin D3    100 tablet    Take 1 tablet (1,000 Units) by mouth daily    Chronic kidney disease, stage III (moderate)       glipiZIDE 5 MG tablet    GLUCOTROL     Take 10 mg by mouth every morning (before breakfast)    Type 2 diabetes mellitus with diabetic polyneuropathy, with long-term current use of insulin (H)       insulin pen needle 31G X 8 MM     200 each    Use for insulin injections twice daily    Type 2 diabetes mellitus with diabetic autonomic neuropathy, with long-term current use of insulin (H)       latanoprost 0.005 % ophthalmic solution    XALATAN    2.5 mL    Apply 1 drop to eye daily Instill  1 drop into the affected eye(s) once daily as directed    Glaucoma of both eyes associated with underlying disease       liraglutide 18 MG/3ML soln    VICTOZA    27 mL    Inject 1.8 mg Subcutaneous daily    Type 2 diabetes mellitus with diabetic polyneuropathy, with long-term current use of insulin (H)       loratadine 10 MG tablet    CLARITIN    30 tablet    Take 1 tablet (10 mg) by mouth daily as needed for allergies    Seasonal allergic rhinitis, unspecified chronicity, unspecified trigger       losartan-hydrochlorothiazide 100-12.5 MG per tablet    HYZAAR    30 tablet    Take 1 tablet by mouth daily    Essential hypertension       ONETOUCH ULTRA test strip   Generic drug:  blood glucose monitoring     100 strip    Use to test blood sugars 2 times daily or as directed.    Type 2 diabetes mellitus with diabetic polyneuropathy, with long-term current use of insulin (H)       pioglitazone 15 MG tablet    ACTOS    30 tablet    Take 1 tablet (15 mg) by mouth daily    Type 2 diabetes mellitus with diabetic polyneuropathy, with long-term current use of insulin (H)       ranitidine 150 MG tablet    ZANTAC    60 tablet    Take 1 tablet (150 mg) by mouth 2 times daily as needed for  heartburn    Gastroesophageal reflux disease without esophagitis

## 2018-06-27 NOTE — NURSING NOTE
Chief Complaint   Patient presents with     Cough     for about 2 months now. Was prescribed to take allergy medications but not helping. Cough is not all the time.      Medication Reconciliation     not completed-- patient did not bring in meds today.        /84  Pulse 65  Temp 97.7  F (36.5  C) (Oral)  Wt 161 lb (73 kg)  SpO2 98%  BMI 37.08 kg/m2

## 2018-07-09 DIAGNOSIS — Z79.4 TYPE 2 DIABETES MELLITUS WITH DIABETIC POLYNEUROPATHY, WITH LONG-TERM CURRENT USE OF INSULIN (H): ICD-10-CM

## 2018-07-09 DIAGNOSIS — E11.42 TYPE 2 DIABETES MELLITUS WITH DIABETIC POLYNEUROPATHY, WITH LONG-TERM CURRENT USE OF INSULIN (H): ICD-10-CM

## 2018-07-10 RX ORDER — PIOGLITAZONEHYDROCHLORIDE 15 MG/1
15 TABLET ORAL DAILY
Qty: 90 TABLET | Refills: 3 | Status: SHIPPED | OUTPATIENT
Start: 2018-07-10 | End: 2018-09-07

## 2018-07-12 ENCOUNTER — TRANSFERRED RECORDS (OUTPATIENT)
Dept: HEALTH INFORMATION MANAGEMENT | Facility: CLINIC | Age: 63
End: 2018-07-12

## 2018-07-24 ENCOUNTER — TELEPHONE (OUTPATIENT)
Dept: FAMILY MEDICINE | Facility: CLINIC | Age: 63
End: 2018-07-24

## 2018-07-24 NOTE — TELEPHONE ENCOUNTER
I received the information from the GI clinic about the liver.  I was informed of the medication they recommend for her Hep B infection and I agree with that medication.  Specialist physicians will prescribe these medications and follow her for this situation.    I am unsure

## 2018-07-24 NOTE — TELEPHONE ENCOUNTER
[facility] phone call message- general phone call:    Reason for call: Per patient arrived at the hepatology Clinic on 7/12 and was prescribed some medication from a doctor there. Patient is confused why a different doctor is prescribing her medication if it's not from her primary care provider. Patient has not taken the medication and will need to speak with her pcp before doing so. She also stated that they scheduled her to go to the  Clinic on [address] in [place] but she missed that apt due to miscommunications. Please call and advise.     Return call needed: Yes    OK to leave a message on voice mail? Yes    Primary language: Bobby      needed? Yes    Call taken on July 24, 2018 at 10:42 AM by Charity Wilson

## 2018-07-25 NOTE — TELEPHONE ENCOUNTER
Patient called this morning to follow up if Dr. Benson received her last message, I told her yes and I also told her that in Dr. Benson's reply she approves the medication patient is concerned about getting from the GI clinic but she still won't take them due to the possible side effects; dizziness and abdominal pain. Patient will schedule a f/u apt with Dr. Benson and discuss about her results and medication later when she is complete with her specialty care apt(s).

## 2018-07-31 DIAGNOSIS — E11.42 TYPE 2 DIABETES MELLITUS WITH DIABETIC POLYNEUROPATHY, WITH LONG-TERM CURRENT USE OF INSULIN (H): ICD-10-CM

## 2018-07-31 DIAGNOSIS — Z79.4 TYPE 2 DIABETES MELLITUS WITH DIABETIC POLYNEUROPATHY, WITH LONG-TERM CURRENT USE OF INSULIN (H): ICD-10-CM

## 2018-07-31 RX ORDER — GLIPIZIDE 5 MG/1
10 TABLET ORAL
Qty: 90 TABLET | Refills: 3 | Status: SHIPPED | OUTPATIENT
Start: 2018-07-31 | End: 2018-10-15

## 2018-08-21 DIAGNOSIS — I10 ESSENTIAL HYPERTENSION: ICD-10-CM

## 2018-08-21 RX ORDER — LOSARTAN POTASSIUM AND HYDROCHLOROTHIAZIDE 12.5; 1 MG/1; MG/1
1 TABLET ORAL DAILY
Qty: 90 TABLET | Refills: 3 | Status: SHIPPED | OUTPATIENT
Start: 2018-08-21 | End: 2019-07-29

## 2018-08-22 NOTE — TELEPHONE ENCOUNTER
Called today again, went straight to , able to leave [facility] this time. Will call 3rd time tomorrow if do not hear back from patient.

## 2018-09-04 DIAGNOSIS — K21.9 GASTROESOPHAGEAL REFLUX DISEASE WITHOUT ESOPHAGITIS: ICD-10-CM

## 2018-09-07 ENCOUNTER — OFFICE VISIT (OUTPATIENT)
Dept: FAMILY MEDICINE | Facility: CLINIC | Age: 63
End: 2018-09-07

## 2018-09-07 VITALS
RESPIRATION RATE: 18 BRPM | HEART RATE: 65 BPM | BODY MASS INDEX: 38.18 KG/M2 | WEIGHT: 165 LBS | SYSTOLIC BLOOD PRESSURE: 158 MMHG | HEIGHT: 55 IN | OXYGEN SATURATION: 97 % | DIASTOLIC BLOOD PRESSURE: 66 MMHG | TEMPERATURE: 97.9 F

## 2018-09-07 DIAGNOSIS — Z79.4 TYPE 2 DIABETES MELLITUS WITH DIABETIC POLYNEUROPATHY, WITH LONG-TERM CURRENT USE OF INSULIN (H): Primary | ICD-10-CM

## 2018-09-07 DIAGNOSIS — B18.1 CHRONIC HEPATITIS B VIRUS INFECTION (H): ICD-10-CM

## 2018-09-07 DIAGNOSIS — E11.42 TYPE 2 DIABETES MELLITUS WITH DIABETIC POLYNEUROPATHY, WITH LONG-TERM CURRENT USE OF INSULIN (H): Primary | ICD-10-CM

## 2018-09-07 DIAGNOSIS — I10 ESSENTIAL HYPERTENSION: ICD-10-CM

## 2018-09-07 LAB
BUN SERPL-MCNC: 28 MG/DL (ref 7–30)
CALCIUM SERPL-MCNC: 9.6 MG/DL (ref 8.5–10.4)
CHLORIDE SERPLBLD-SCNC: 112 MMOL/L (ref 94–109)
CO2 SERPL-SCNC: 26 MMOL/L (ref 20–32)
CREAT SERPL-MCNC: 1.1 MG/DL (ref 0.6–1.3)
CREAT UR-MCNC: 65.2 MG/DL
EGFR CALCULATED (BLACK REFERENCE): 64.5 ML/MIN
EGFR CALCULATED (NON BLACK REFERENCE): 53.3 ML/MIN
GLUCOSE SERPL-MCNC: 74 MG/DL (ref 60–109)
HBA1C MFR BLD: 8.7 % (ref 4.1–5.7)
MICROALBUMIN UR-MCNC: 24.77 MG/DL (ref 0–1.99)
MICROALBUMIN/CREAT UR: 379.9 MG/G
POTASSIUM SERPL-SCNC: 4.7 MMOL/L (ref 3.4–5.3)
SODIUM SERPL-SCNC: 145 MMOL/L (ref 133–144)

## 2018-09-07 NOTE — PROGRESS NOTES
"SUBJECTIVE:  No chief complaint on file.      1. Type 2 DM:  Sugars vary, didn't bring meter, this AM 150s, not very high not very low numbers, possibly slightly higher than before    2. Hep B chronic  -has new med but didn't actually start it, worries about # of pills, feels fine so doesn't understand why it is needed, and heard of side effects    OBJECTIVE:  /66  Pulse 65  Temp 97.9  F (36.6  C) (Oral)  Resp 18  Ht 4' 7.12\" (140 cm)  Wt 165 lb (74.8 kg)  SpO2 97%  BMI 38.19 kg/m2  LUNGS: CTAB, no wheezing, no rales, no crackles, no accessory muscle use  COR: normal rate, regular rhythm and no murmurs, clicks, or gallops  -lower extremities : no edema     Hemoglobin A1C   Date Value Ref Range Status   09/07/2018 8.7 (H) 4.1 - 5.7 % Final     Last Comprehensive Metabolic Panel:  Sodium   Date Value Ref Range Status   09/07/2018 145.0 (H) 133.0 - 144.0 mmol/L Final     Potassium   Date Value Ref Range Status   09/07/2018 4.7 3.4 - 5.3 mmol/L Final     Chloride   Date Value Ref Range Status   09/07/2018 112.0 (H) 94.0 - 109.0 mmol/L Final     Carbon Dioxide   Date Value Ref Range Status   09/07/2018 26.0 20.0 - 32.0 mmol/L Final     Glucose   Date Value Ref Range Status   09/07/2018 74.0 60.0 - 109.0 mg/dL Final     Urea Nitrogen   Date Value Ref Range Status   09/07/2018 28.0 7.0 - 30.0 mg/dL Final     Creatinine   Date Value Ref Range Status   09/07/2018 1.1 0.6 - 1.3 mg/dL Final     GFR Estimate   Date Value Ref Range Status   07/19/2017 40 (L) >60 mL/min/1.73m2 Final     Calcium   Date Value Ref Range Status   09/07/2018 9.6 8.5 - 10.4 mg/dL Final       ASSESSMENT/PLAN:  (E11.42,  Z79.4) Type 2 diabetes mellitus with diabetic polyneuropathy, with long-term current use of insulin (H)  (primary encounter diagnosis)  Comment: uncontrolled no longer at goal of a1c<8  Plan: Hemoglobin A1c ([facility]), Basic Metabolic Panel         ([facility]) - Results < 1 hr, Microalbumin         Creatinine Ratio Random Ur " ([facility])        Will stop pioglitazone, switch to invokanna as possibly better overall health improvement fewer negative side effects    (B18.1) Chronic hepatitis B virus infection (H)  Comment: Discussed signs of chronic hep B  Plan: recommend at least starting the med and seeing if any side effects occur, can discontinue if needed.    3.  HTN: not at goal today, did not take AM BP pill today, so will not adjust

## 2018-09-07 NOTE — LETTER
September 10, 2018      Oimd Adorno  [address] [place] [address]        Dear Omid,    Please see below for your test results.  Your urine test also shows that your sugars are higher than we like as we discussed at your visit.  Keep up with your blood pressure medication and see how this new diabetic medication works.    Resulted Orders   Hemoglobin A1c ([facility])   Result Value Ref Range    Hemoglobin A1C 8.7 (H) 4.1 - 5.7 %   Basic Metabolic Panel ([facility]) - Results < 1 hr   Result Value Ref Range    Glucose 74.0 60.0 - 109.0 mg/dL    Urea Nitrogen 28.0 7.0 - 30.0 mg/dL    Creatinine 1.1 0.6 - 1.3 mg/dL    Sodium 145.0 (H) 133.0 - 144.0 mmol/L    Potassium 4.7 3.4 - 5.3 mmol/L    Chloride 112.0 (H) 94.0 - 109.0 mmol/L    Carbon Dioxide 26.0 20.0 - 32.0 mmol/L    Calcium 9.6 8.5 - 10.4 mg/dL    eGFR Calculated (Non Black Reference) 53.3 (L) >60.0 mL/min    eGFR Calculated (Black Reference) 64.5 >60.0 mL/min   Microalbumin Creatinine Ratio Random Ur ([facility])   Result Value Ref Range    Microalbumin, Urine 24.77 (H) 0.00 - 1.99 mg/dL    Creatinine, Urine 65.2 mg/dL    Albumin Urine mg/g Cr 379.9 (H) <=19.9 mg/g    Narrative       If you have any questions, please call the clinic to make an appointment.    Sincerely,    Shannan Benson MD

## 2018-09-07 NOTE — MR AVS SNAPSHOT
"              After Visit Summary   9/7/2018    Omid Adorno    MRN: [id number]           Patient Information     Date Of Birth          1955        Visit Information        Provider Department      9/7/2018 8:20 AM Shannan Benson MD [facility]        Today's Diagnoses     Type 2 diabetes mellitus with diabetic polyneuropathy, with long-term current use of insulin (H)    -  1    Chronic hepatitis B virus infection (H)           Follow-ups after your visit        Who to contact     Please call your clinic at [phone] to:    Ask questions about your health    Make or cancel appointments    Discuss your medicines    Learn about your test results    Speak to your doctor            Additional Information About Your Visit        Care EveryWhere ID     This is your Care EveryWhere ID. This could be used by other organizations to access your [facility] medical records  [id number]        Your Vitals Were     Pulse Temperature Respirations Height Pulse Oximetry BMI (Body Mass Index)    65 97.9  F (36.6  C) (Oral) 18 4' 7.12\" (140 cm) 97% 38.19 kg/m2       Blood Pressure from Last 3 Encounters:   09/07/18 158/66   06/27/18 129/84   05/09/18 133/78    Weight from Last 3 Encounters:   09/07/18 165 lb (74.8 kg)   06/27/18 161 lb (73 kg)   05/09/18 158 lb 9.6 oz (71.9 kg)              We Performed the Following     Basic Metabolic Panel ([facility]) - Results < 1 hr     Hemoglobin A1c ([facility])     Microalbumin Creatinine Ratio Random Ur ([facility])          Today's Medication Changes          These changes are accurate as of 9/7/18  8:59 AM.  If you have any questions, ask your nurse or doctor.               Stop taking these medicines if you haven't already. Please contact your care team if you have questions.     pioglitazone 15 MG tablet   Commonly known as:  ACTOS   Stopped by:  Shannan Benson MD                    Primary Care Provider Office Phone # Fax #    Shannan Benson [phone] " [phone]       [address] [place], [place] [id number]        Equal Access to Services     LILY JULIEN : Hadii anselmo Allen, millie joseph, citlaly minayaneilrogerio santamaria, kyle dotymarcianopatricia soria. So [facility] [phone].    ATENCIÓN: Si habla español, tiene a mcclure disposición servicios gratuitos de asistencia lingüística. Llame al [phone].    We comply with applicable federal civil rights laws and [place] laws. We do not discriminate on the basis of race, color, national origin, age, disability, sex, sexual orientation, or gender identity.            Thank you!     Thank you for choosing [facility]  for your care. Our goal is always to provide you with excellent care. Hearing back from our patients is one way we can continue to improve our services. Please take a few minutes to complete the written survey that you may receive in the mail after your visit with us. Thank you!             Your Updated Medication List - Protect others around you: Learn how to safely use, store and throw away your medicines at www.disposemymeds.org.          This list is accurate as of 9/7/18  8:59 AM.  Always use your most recent med list.                   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 (H)       aspirin 81 MG tablet     90 tablet    Take 1 tablet (81 mg) by mouth daily    Type 2 diabetes mellitus with diabetic polyneuropathy, unspecified long term insulin use status (H)       atorvastatin 40 MG tablet    LIPITOR    90 tablet    Take 1 tablet (40 mg) by mouth daily    Pure hypercholesterolemia       BASAGLAR 100 UNIT/ML injection     45 mL    Inject 70 units SQ in AM and 54 units SQ in PM    Type 2 diabetes mellitus with diabetic polyneuropathy, with long-term  current use of insulin (H)       blood glucose monitoring lancets     100 each    Use to test blood sugars 2 times daily or as directed.    Type 2 diabetes mellitus with diabetic polyneuropathy, with long-term current use of insulin (H)       blood glucose monitoring meter device kit     1 kit    Use to test blood sugars 2 times daily or as directed.    Type 2 diabetes mellitus with diabetic polyneuropathy, with long-term current use of insulin (H)       cholecalciferol 1000 UNIT tablet    vitamin D3    100 tablet    Take 1 tablet (1,000 Units) by mouth daily    Chronic kidney disease, stage III (moderate)       glipiZIDE 5 MG tablet    GLUCOTROL    90 tablet    Take 2 tablets (10 mg) by mouth every morning (before breakfast)    Type 2 diabetes mellitus with diabetic polyneuropathy, with long-term current use of insulin (H)       insulin pen needle 31G X 8 MM     200 each    Use for insulin injections twice daily    Type 2 diabetes mellitus with diabetic autonomic neuropathy, with long-term current use of insulin (H)       latanoprost 0.005 % ophthalmic solution    XALATAN    2.5 mL    Apply 1 drop to eye daily Instill  1 drop into the affected eye(s) once daily as directed    Glaucoma of both eyes associated with underlying disease       liraglutide 18 MG/3ML soln    VICTOZA    27 mL    Inject 1.8 mg Subcutaneous daily    Type 2 diabetes mellitus with diabetic polyneuropathy, with long-term current use of insulin (H)       loratadine 10 MG tablet    CLARITIN    30 tablet    Take 1 tablet (10 mg) by mouth daily as needed for allergies    Seasonal allergic rhinitis, unspecified chronicity, unspecified trigger       losartan-hydrochlorothiazide 100-12.5 MG per tablet    HYZAAR    90 tablet    Take 1 tablet by mouth daily    Essential hypertension       ONETOUCH ULTRA test strip   Generic drug:  blood glucose monitoring     100 strip    Use to test blood sugars 2 times daily or as directed.    Type 2 diabetes mellitus  with diabetic polyneuropathy, with long-term current use of insulin (H)       ranitidine 150 MG tablet    ZANTAC    60 tablet    Take 1 tablet (150 mg) by mouth 2 times daily as needed for heartburn    Gastroesophageal reflux disease without esophagitis

## 2018-10-15 DIAGNOSIS — Z79.4 TYPE 2 DIABETES MELLITUS WITH DIABETIC POLYNEUROPATHY, WITH LONG-TERM CURRENT USE OF INSULIN (H): ICD-10-CM

## 2018-10-15 DIAGNOSIS — N18.30 CHRONIC KIDNEY DISEASE, STAGE III (MODERATE) (H): ICD-10-CM

## 2018-10-15 DIAGNOSIS — E11.42 TYPE 2 DIABETES MELLITUS WITH DIABETIC POLYNEUROPATHY, WITH LONG-TERM CURRENT USE OF INSULIN (H): ICD-10-CM

## 2018-10-16 RX ORDER — GLIPIZIDE 5 MG/1
10 TABLET ORAL
Qty: 90 TABLET | Refills: 3 | Status: SHIPPED | OUTPATIENT
Start: 2018-10-16 | End: 2018-11-12

## 2018-10-17 DIAGNOSIS — H42 GLAUCOMA OF BOTH EYES ASSOCIATED WITH UNDERLYING DISEASE: ICD-10-CM

## 2018-10-17 DIAGNOSIS — E11.42 TYPE 2 DIABETES MELLITUS WITH DIABETIC POLYNEUROPATHY, WITH LONG-TERM CURRENT USE OF INSULIN (H): ICD-10-CM

## 2018-10-17 DIAGNOSIS — Z79.4 TYPE 2 DIABETES MELLITUS WITH DIABETIC POLYNEUROPATHY, WITH LONG-TERM CURRENT USE OF INSULIN (H): ICD-10-CM

## 2018-10-17 RX ORDER — LATANOPROST 50 UG/ML
1 SOLUTION/ DROPS OPHTHALMIC DAILY
Qty: 2.5 ML | Refills: 3 | Status: SHIPPED | OUTPATIENT
Start: 2018-10-17 | End: 2019-02-19

## 2018-10-17 NOTE — TELEPHONE ENCOUNTER
Message to physician:     Date of last visit: 9/7/2018    Date of next visit if scheduled: Visit date 10/31/18    Last Comprehensive Metabolic Panel:  Sodium   Date Value Ref Range Status   09/07/2018 145.0 (H) 133.0 - 144.0 mmol/L Final     Potassium   Date Value Ref Range Status   09/07/2018 4.7 3.4 - 5.3 mmol/L Final     Chloride   Date Value Ref Range Status   09/07/2018 112.0 (H) 94.0 - 109.0 mmol/L Final     Carbon Dioxide   Date Value Ref Range Status   09/07/2018 26.0 20.0 - 32.0 mmol/L Final     Glucose   Date Value Ref Range Status   09/07/2018 74.0 60.0 - 109.0 mg/dL Final     Urea Nitrogen   Date Value Ref Range Status   09/07/2018 28.0 7.0 - 30.0 mg/dL Final     Creatinine   Date Value Ref Range Status   09/07/2018 1.1 0.6 - 1.3 mg/dL Final     GFR Estimate   Date Value Ref Range Status   07/19/2017 40 (L) >60 mL/min/1.73m2 Final     Calcium   Date Value Ref Range Status   09/07/2018 9.6 8.5 - 10.4 mg/dL Final       BP Readings from Last 3 Encounters:   09/07/18 158/66   06/27/18 129/84   05/09/18 133/78       Lab Results   Component Value Date    A1C 8.7 09/07/2018    A1C 7.8 05/09/2018    A1C 7.9 02/06/2018    A1C 10.0 10/25/2017    A1C 9.6 07/19/2017                Please complete refill and CLOSE ENCOUNTER.  Closing the encounter signifies the refill is complete.

## 2018-10-31 ENCOUNTER — OFFICE VISIT (OUTPATIENT)
Dept: FAMILY MEDICINE | Facility: CLINIC | Age: 63
End: 2018-10-31

## 2018-10-31 VITALS
OXYGEN SATURATION: 97 % | BODY MASS INDEX: 36.1 KG/M2 | SYSTOLIC BLOOD PRESSURE: 154 MMHG | RESPIRATION RATE: 18 BRPM | HEIGHT: 55 IN | TEMPERATURE: 97.5 F | DIASTOLIC BLOOD PRESSURE: 73 MMHG | WEIGHT: 156 LBS | HEART RATE: 64 BPM

## 2018-10-31 DIAGNOSIS — Z23 FLU VACCINE NEED: ICD-10-CM

## 2018-10-31 DIAGNOSIS — I10 ESSENTIAL HYPERTENSION: Primary | ICD-10-CM

## 2018-10-31 DIAGNOSIS — R19.7 DIARRHEA, UNSPECIFIED TYPE: ICD-10-CM

## 2018-10-31 LAB
BUN SERPL-MCNC: 37 MG/DL (ref 7–30)
CALCIUM SERPL-MCNC: 9.9 MG/DL (ref 8.5–10.4)
CHLORIDE SERPLBLD-SCNC: 107 MMOL/L (ref 94–109)
CO2 SERPL-SCNC: 27 MMOL/L (ref 20–32)
CREAT SERPL-MCNC: 1.6 MG/DL (ref 0.6–1.3)
EGFR CALCULATED (BLACK REFERENCE): 41.9 ML/MIN
EGFR CALCULATED (NON BLACK REFERENCE): 34.6 ML/MIN
GLUCOSE SERPL-MCNC: 254 MG/DL (ref 60–109)
POTASSIUM SERPL-SCNC: 4.6 MMOL/L (ref 3.4–5.3)
SODIUM SERPL-SCNC: 144 MMOL/L (ref 133–144)

## 2018-10-31 ASSESSMENT — PATIENT HEALTH QUESTIONNAIRE - PHQ9: SUM OF ALL RESPONSES TO PHQ QUESTIONS 1-9: 3

## 2018-10-31 NOTE — PROGRESS NOTES
"SUBJECTIVE:  Patient presents with:  Diabetes: forgot meter   Medication Reconciliation: completed       1. Type 2 DM:  Has taken meds without issues, most sugars are in the 100s, some 200s  Very rarely a 300, that occurred after sticky rice    2.  Loose bowels:  -last several weeks have intermittent loose bowels, not feeling ill  -notices more with fatty foods  -had been using peptobismol but dislikes that, never has issues with blood, some diffuse cramping    OBJECTIVE:  /73  Pulse 64  Temp 97.5  F (36.4  C) (Oral)  Resp 18  Ht 4' 7.12\" (140 cm)  Wt 156 lb (70.8 kg)  SpO2 97%  BMI 36.1 kg/m2  LUNGS: CTAB, no wheezing, no rales, no crackles, no accessory muscle use  COR: normal rate, regular rhythm and no murmurs, clicks, or gallops  -lower extremities : no edema     Hemoglobin A1C   Date Value Ref Range Status   09/07/2018 8.7 (H) 4.1 - 5.7 % Final     Last Comprehensive Metabolic Panel:  Sodium   Date Value Ref Range Status   09/07/2018 145.0 (H) 133.0 - 144.0 mmol/L Final     Potassium   Date Value Ref Range Status   09/07/2018 4.7 3.4 - 5.3 mmol/L Final     Chloride   Date Value Ref Range Status   09/07/2018 112.0 (H) 94.0 - 109.0 mmol/L Final     Carbon Dioxide   Date Value Ref Range Status   09/07/2018 26.0 20.0 - 32.0 mmol/L Final     Glucose   Date Value Ref Range Status   09/07/2018 74.0 60.0 - 109.0 mg/dL Final     Urea Nitrogen   Date Value Ref Range Status   09/07/2018 28.0 7.0 - 30.0 mg/dL Final     Creatinine   Date Value Ref Range Status   09/07/2018 1.1 0.6 - 1.3 mg/dL Final     GFR Estimate   Date Value Ref Range Status   07/19/2017 40 (L) >60 mL/min/1.73m2 Final     Calcium   Date Value Ref Range Status   09/07/2018 9.6 8.5 - 10.4 mg/dL Final       ASSESSMENT/PLAN:  (I10) Essential hypertension  (primary encounter diagnosis)  Comment: will carefully monitor renal function now on invokanna  Plan: Basic Metabolic Panel ([facility])  - Results < 1         hr        Controlled meds    (Z23) " Flu vaccine need  Comment: agree  Plan: ADMIN VACCINE, INITIAL, FLU VAC PRESRV FREE         QUAD SPLIT VIR IM, 0.5 mL dosage            (R19.7) Diarrhea, unspecified type  Comment: reviewed GI consult and liver ultrasound, and hepatic labs  Plan: psyllium 0.52 g capsule        Trial of alternative med, unsure if this is med induced, if biliary, last FIT test negative  Due for FIT testing and would encourage colonoscopy

## 2018-10-31 NOTE — NURSING NOTE
Due to patient being non-English speaking/uses sign language, an  was used for this visit. Only for face-to-face interpretation by an external agency, date and length of interpretation can be found on the scanned worksheet.     name: moon dumont  Agency: [employer]  Language: Bobby   Telephone number: [phone]  Type of interpretation: Face-to-face, spoken

## 2018-10-31 NOTE — NURSING NOTE
Injectable influenza vaccine documentation    1. Has the patient received the information for the influenza vaccine? YES    2. Does the patient have a severe allergy to eggs (Patients with a severe egg allergy should be assessed by a medical provider, RN, or clinical pharmacist. If they receive the influenza vaccine, please have them observed for 15 minutes.)? No    3. Has the patient had an allergic reaction to previous influenza vaccines? No    4. Has the patient had any severe allergic reactions to past influenza vaccines ? No       5. Does patient have a history of Guillain-[place] syndrome? No      Based on responses above, I administered the influenza vaccine.  Julia Moyer MA

## 2018-10-31 NOTE — PATIENT INSTRUCTIONS
- We will check your A1c in December.       Your medication list is printed, please keep this with you, it is helpful to bring this current list to any other medical appointments, the emergency room or hospital.    If you had lab testing today and your results are reassuring or normal they will be be mailed to you within 7 days.     If the lab tests need quick action we will call you with the results.   The phone number we will call with results is # [phone] (home) . If this is not the best number please call our clinic and change the number.    If you need any refills please call your pharmacy and they will contact us.    If you have any further concerns or wish to schedule another appointment you must call our office during normal business hours  [phone] (8-5:00 M-F)  If you have urgent medical questions that cannot wait  you may also call [phone] at any time of day.  If you have a medical emergency please call [phone].    Thank you for coming to [facility].

## 2018-10-31 NOTE — MR AVS SNAPSHOT
After Visit Summary   10/31/2018    Omid Adorno    MRN: [id number]           Patient Information     Date Of Birth          1955        Visit Information        Provider Department      10/31/2018 4:20 PM Shannan Benson MD [facility]        Today's Diagnoses     Essential hypertension    -  1    Flu vaccine need        Diarrhea, unspecified type          Care Instructions    - We will check your A1c in December.       Your medication list is printed, please keep this with you, it is helpful to bring this current list to any other medical appointments, the emergency room or hospital.    If you had lab testing today and your results are reassuring or normal they will be be mailed to you within 7 days.     If the lab tests need quick action we will call you with the results.   The phone number we will call with results is # [phone] (home) . If this is not the best number please call our clinic and change the number.    If you need any refills please call your pharmacy and they will contact us.    If you have any further concerns or wish to schedule another appointment you must call our office during normal business hours  [phone] (8-5:00 M-F)  If you have urgent medical questions that cannot wait  you may also call [phone] at any time of day.  If you have a medical emergency please call 555.    Thank you for coming to [facility].            Follow-ups after your visit        Your next 10 appointments already scheduled     Dec 04, 2018  4:20 PM CST   Return Visit with Shannan Benson MD   [facility] ([facility])    [address] [place] [address]   [phone]              Who to contact     Please call your clinic at [phone] to:    Ask questions about your health    Make or cancel appointments    Discuss your medicines    Learn about your test results    Speak to your doctor            Additional Information About Your  "Visit        Care EveryWhere ID     This is your Care EveryWhere ID. This could be used by other organizations to access your [place] medical records  [id number]        Your Vitals Were     Pulse Temperature Respirations Height Pulse Oximetry BMI (Body Mass Index)    64 97.5  F (36.4  C) (Oral) 18 4' 7.12\" (140 cm) 97% 36.1 kg/m2       Blood Pressure from Last 3 Encounters:   10/31/18 154/73   09/07/18 158/66   06/27/18 129/84    Weight from Last 3 Encounters:   10/31/18 156 lb (70.8 kg)   09/07/18 165 lb (74.8 kg)   06/27/18 161 lb (73 kg)              We Performed the Following     ADMIN VACCINE, INITIAL     Basic Metabolic Panel ([facility])  - Results < 1 hr     FLU VAC PRESRV FREE QUAD SPLIT VIR IM, 0.5 mL dosage          Today's Medication Changes          These changes are accurate as of 10/31/18  4:45 PM.  If you have any questions, ask your nurse or doctor.               Start taking these medicines.        Dose/Directions    psyllium 0.52 g capsule   Used for:  Diarrhea, unspecified type   Started by:  Shannan Benson MD        Dose:  1 capsule   Take 1 capsule (0.52 g) by mouth daily   Quantity:  90 capsule   Refills:  1            Where to get your medicines      These medications were sent to [facility]/pharmacy #[id number] - [place], [place] - [address], [place] [address]     Phone:  [phone]     psyllium 0.52 g capsule                Primary Care Provider Office Phone # Fax #    Shannan Benson [phone]       [address] [place] [address]        Equal Access to Services     [facility] CenterBENEDICT : Hadii anselmo Allen, waconyda luqadaha, qaybta kaalmarogerio santamaria, kyle liao . So [facility] [phone].    ATENCIÓN: Si habla español, tiene a mcclure disposición servicios gratuitos de asistencia lingüística. Llame al [phone].    We comply with applicable federal civil rights laws and [place] laws. We do not " discriminate on the basis of race, color, national origin, age, disability, sex, sexual orientation, or gender identity.            Thank you!     Thank you for choosing [facility]  for your care. Our goal is always to provide you with excellent care. Hearing back from our patients is one way we can continue to improve our services. Please take a few minutes to complete the written survey that you may receive in the mail after your visit with us. Thank you!             Your Updated Medication List - Protect others around you: Learn how to safely use, store and throw away your medicines at www.disposemymeds.org.          This list is accurate as of 10/31/18  4:45 PM.  Always use your most recent med list.                   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 (H)       aspirin 81 MG tablet     90 tablet    Take 1 tablet (81 mg) by mouth daily    Type 2 diabetes mellitus with diabetic polyneuropathy, with long-term current use of insulin (H)       atorvastatin 40 MG tablet    LIPITOR    90 tablet    Take 1 tablet (40 mg) by mouth daily    Pure hypercholesterolemia       BASAGLAR 100 UNIT/ML injection     45 mL    Inject 70 units SQ in AM and 54 units SQ in PM    Type 2 diabetes mellitus with diabetic polyneuropathy, with long-term current use of insulin (H)       blood glucose monitoring lancets     100 each    Use to test blood sugars 2 times daily or as directed.    Type 2 diabetes mellitus with diabetic polyneuropathy, with long-term current use of insulin (H)       blood glucose monitoring meter device kit     1 kit    Use to test blood sugars 2 times daily or as directed.    Type 2 diabetes mellitus with diabetic polyneuropathy, with long-term current use of insulin (H)        blood glucose monitoring test strip    ONETOUCH ULTRA    200 strip    Use to test blood sugars 2 times daily or as directed.    Type 2 diabetes mellitus with diabetic polyneuropathy, with long-term current use of insulin (H)       canagliflozin 100 MG tablet    INVOKANA    90 tablet    Take 1 tablet (100 mg) by mouth every morning (before breakfast)    Chronic hepatitis B virus infection (H)       cholecalciferol 1000 UNIT tablet    vitamin D3    100 tablet    Take 1 tablet (1,000 Units) by mouth daily    Chronic kidney disease, stage III (moderate) (H)       glipiZIDE 5 MG tablet    GLUCOTROL    90 tablet    Take 2 tablets (10 mg) by mouth every morning (before breakfast)    Type 2 diabetes mellitus with diabetic polyneuropathy, with long-term current use of insulin (H)       insulin pen needle 31G X 8 MM     200 each    Use for insulin injections twice daily    Type 2 diabetes mellitus with diabetic autonomic neuropathy, with long-term current use of insulin (H)       latanoprost 0.005 % ophthalmic solution    XALATAN    2.5 mL    Apply 1 drop to eye daily Instill  1 drop into the affected eye(s) once daily as directed    Glaucoma of both eyes associated with underlying disease       liraglutide 18 MG/3ML soln    VICTOZA    27 mL    Inject 1.8 mg Subcutaneous daily    Type 2 diabetes mellitus with diabetic polyneuropathy, with long-term current use of insulin (H)       loratadine 10 MG tablet    CLARITIN    30 tablet    Take 1 tablet (10 mg) by mouth daily as needed for allergies    Seasonal allergic rhinitis, unspecified chronicity, unspecified trigger       losartan-hydrochlorothiazide 100-12.5 MG per tablet    HYZAAR    90 tablet    Take 1 tablet by mouth daily    Essential hypertension       psyllium 0.52 g capsule     90 capsule    Take 1 capsule (0.52 g) by mouth daily    Diarrhea, unspecified type       ranitidine 150 MG tablet    ZANTAC    60 tablet    Take 1 tablet (150 mg) by mouth 2 times daily as  needed for heartburn    Gastroesophageal reflux disease without esophagitis       VEMLIDY 25 MG tablet   Generic drug:  tenofovir alafenamide fumarate     30 tablet    Take 1 tablet (25 mg) by mouth daily with food (dispense only in the original container).    Type 2 diabetes mellitus with diabetic polyneuropathy, with long-term current use of insulin (H)

## 2018-11-02 NOTE — PROGRESS NOTES
Call patient:    Kidneys are slightly reduced, would watch closely.  We should recheck kidneys at your visit in December.  Stay hydrated!

## 2018-11-07 DIAGNOSIS — M05.742 RHEUMATOID ARTHRITIS INVOLVING BOTH HANDS WITH POSITIVE RHEUMATOID FACTOR (H): ICD-10-CM

## 2018-11-07 DIAGNOSIS — M05.741 RHEUMATOID ARTHRITIS INVOLVING BOTH HANDS WITH POSITIVE RHEUMATOID FACTOR (H): ICD-10-CM

## 2018-11-07 DIAGNOSIS — Z79.4 TYPE 2 DIABETES MELLITUS WITH DIABETIC POLYNEUROPATHY, WITH LONG-TERM CURRENT USE OF INSULIN (H): ICD-10-CM

## 2018-11-07 DIAGNOSIS — E11.42 TYPE 2 DIABETES MELLITUS WITH DIABETIC POLYNEUROPATHY, WITH LONG-TERM CURRENT USE OF INSULIN (H): ICD-10-CM

## 2018-11-07 DIAGNOSIS — E78.00 PURE HYPERCHOLESTEROLEMIA: ICD-10-CM

## 2018-11-07 RX ORDER — LIRAGLUTIDE 6 MG/ML
1.8 INJECTION SUBCUTANEOUS DAILY
Qty: 27 ML | Refills: 3 | Status: SHIPPED | OUTPATIENT
Start: 2018-11-07 | End: 2019-04-01

## 2018-11-07 RX ORDER — ATORVASTATIN CALCIUM 40 MG/1
40 TABLET, FILM COATED ORAL DAILY
Qty: 90 TABLET | Refills: 3 | Status: SHIPPED | OUTPATIENT
Start: 2018-11-07 | End: 2019-09-30

## 2018-11-07 RX ORDER — ACETAMINOPHEN 325 MG/1
325-650 TABLET ORAL EVERY 4 HOURS PRN
Qty: 100 TABLET | Refills: 3 | Status: SHIPPED | OUTPATIENT
Start: 2018-11-07 | End: 2019-04-29

## 2018-11-07 NOTE — TELEPHONE ENCOUNTER
Message to physician:     Date of last visit: 10/31/2018     Date of next visit if scheduled: Visit date 12/04/18    Last Comprehensive Metabolic Panel:  Sodium   Date Value Ref Range Status   10/31/2018 144.0 133.0 - 144.0 mmol/L Final     Potassium   Date Value Ref Range Status   10/31/2018 4.6 3.4 - 5.3 mmol/L Final     Chloride   Date Value Ref Range Status   10/31/2018 107.0 94.0 - 109.0 mmol/L Final     Carbon Dioxide   Date Value Ref Range Status   10/31/2018 27.0 20.0 - 32.0 mmol/L Final     Glucose   Date Value Ref Range Status   10/31/2018 254.0 (H) 60.0 - 109.0 mg/dL Final     Urea Nitrogen   Date Value Ref Range Status   10/31/2018 37.0 (H) 7.0 - 30.0 mg/dL Final     Creatinine   Date Value Ref Range Status   10/31/2018 1.6 (H) 0.6 - 1.3 mg/dL Final     GFR Estimate   Date Value Ref Range Status   07/19/2017 40 (L) >60 mL/min/1.73m2 Final     Calcium   Date Value Ref Range Status   10/31/2018 9.9 8.5 - 10.4 mg/dL Final       BP Readings from Last 3 Encounters:   10/31/18 154/73   09/07/18 158/66   06/27/18 129/84       Lab Results   Component Value Date    A1C 8.7 09/07/2018    A1C 7.8 05/09/2018    A1C 7.9 02/06/2018    A1C 10.0 10/25/2017    A1C 9.6 07/19/2017                Please complete refill and CLOSE ENCOUNTER.  Closing the encounter signifies the refill is complete.

## 2018-11-09 ENCOUNTER — TELEPHONE (OUTPATIENT)
Dept: FAMILY MEDICINE | Facility: CLINIC | Age: 63
End: 2018-11-09

## 2018-11-09 NOTE — TELEPHONE ENCOUNTER
Called Omid to obtain further details, no answer, left a message for Omid to call me back. Ten FELICIANO

## 2018-11-09 NOTE — TELEPHONE ENCOUNTER
Patient called and want to let DR galicia know that her blood sugar has been running very high from 400-500 through out the day for more then 2 weeks.She also stated that she feel very tired and not has appetite.The insulin not work for her any more.I did offer her appointment to see one of provider this afternoon and patient decline appointment and only want to see DR Galicia or talk to DR Galicia.

## 2018-11-12 ENCOUNTER — OFFICE VISIT (OUTPATIENT)
Dept: FAMILY MEDICINE | Facility: CLINIC | Age: 63
End: 2018-11-12

## 2018-11-12 VITALS
TEMPERATURE: 97.6 F | BODY MASS INDEX: 35.45 KG/M2 | RESPIRATION RATE: 18 BRPM | OXYGEN SATURATION: 95 % | SYSTOLIC BLOOD PRESSURE: 116 MMHG | HEIGHT: 55 IN | HEART RATE: 70 BPM | DIASTOLIC BLOOD PRESSURE: 75 MMHG | WEIGHT: 153.2 LBS

## 2018-11-12 DIAGNOSIS — N18.30 CHRONIC KIDNEY DISEASE, STAGE III (MODERATE) (H): ICD-10-CM

## 2018-11-12 DIAGNOSIS — Z79.4 TYPE 2 DIABETES MELLITUS WITH DIABETIC POLYNEUROPATHY, WITH LONG-TERM CURRENT USE OF INSULIN (H): Primary | ICD-10-CM

## 2018-11-12 DIAGNOSIS — E11.42 TYPE 2 DIABETES MELLITUS WITH DIABETIC POLYNEUROPATHY, WITH LONG-TERM CURRENT USE OF INSULIN (H): Primary | ICD-10-CM

## 2018-11-12 LAB
BUN SERPL-MCNC: 53 MG/DL (ref 7–30)
CALCIUM SERPL-MCNC: 9.5 MG/DL (ref 8.5–10.4)
CHLORIDE SERPLBLD-SCNC: 103 MMOL/L (ref 94–109)
CO2 SERPL-SCNC: 26 MMOL/L (ref 20–32)
CREAT SERPL-MCNC: 1.6 MG/DL (ref 0.6–1.3)
EGFR CALCULATED (BLACK REFERENCE): 41.9 ML/MIN
EGFR CALCULATED (NON BLACK REFERENCE): 34.6 ML/MIN
GLUCOSE SERPL-MCNC: 239 MG/DL (ref 60–109)
POTASSIUM SERPL-SCNC: 4.4 MMOL/L (ref 3.4–5.3)
SODIUM SERPL-SCNC: 139 MMOL/L (ref 133–144)

## 2018-11-12 RX ORDER — INSULIN GLARGINE 100 [IU]/ML
INJECTION, SOLUTION SUBCUTANEOUS
Qty: 45 ML | Refills: 3 | Status: SHIPPED | OUTPATIENT
Start: 2018-11-12 | End: 2018-11-20

## 2018-11-12 NOTE — MR AVS SNAPSHOT
"              After Visit Summary   11/12/2018    Omid Adorno    MRN: [id number]           Patient Information     Date Of Birth          1955        Visit Information        Provider Department      11/12/2018 11:00 AM Shannan Benson MD [facility]        Today's Diagnoses     Chronic kidney disease, stage III (moderate) (H)    -  1    Type 2 diabetes mellitus with diabetic polyneuropathy, with long-term current use of insulin (H)           Follow-ups after your visit        Your next 10 appointments already scheduled     Dec 04, 2018  4:20 PM CST   Return Visit with Shannan Benson MD   [facility] ([facility])    [address] [place] [address]   [phone]              Who to contact     Please call your clinic at [phone] to:    Ask questions about your health    Make or cancel appointments    Discuss your medicines    Learn about your test results    Speak to your doctor            Additional Information About Your Visit        Care EveryWhere ID     This is your Care EveryWhere ID. This could be used by other organizations to access your [place] medical records  [id number]        Your Vitals Were     Pulse Temperature Respirations Height Pulse Oximetry BMI (Body Mass Index)    70 97.6  F (36.4  C) 18 4' 7\" (139.7 cm) 95% 35.61 kg/m2       Blood Pressure from Last 3 Encounters:   11/12/18 116/75   10/31/18 154/73   09/07/18 158/66    Weight from Last 3 Encounters:   11/12/18 153 lb 3.2 oz (69.5 kg)   10/31/18 156 lb (70.8 kg)   09/07/18 165 lb (74.8 kg)              We Performed the Following     Basic Metabolic Panel ([facility])  - Results < 1 hr          Today's Medication Changes          These changes are accurate as of 11/12/18 12:19 PM.  If you have any questions, ask your nurse or doctor.               Start taking these medicines.        Dose/Directions    insulin lispro 100 UNIT/ML injection   Commonly known as:  ADMELOG SOLOSTAR   Used for:  " Type 2 diabetes mellitus with diabetic polyneuropathy, with long-term current use of insulin (H)   Started by:  Shannan Benson MD        Dose:  10 Units   Inject 10 Units Subcutaneous 3 times daily (with meals)   Quantity:  15 mL   Refills:  1         These medicines have changed or have updated prescriptions.        Dose/Directions    BASAGLAR 100 UNIT/ML injection   This may have changed:  additional instructions   Used for:  Type 2 diabetes mellitus with diabetic polyneuropathy, with long-term current use of insulin (H)   Changed by:  Shannan Benson MD        Inject 54 units SQ in PM   Quantity:  45 mL   Refills:  3         Stop taking these medicines if you haven't already. Please contact your care team if you have questions.     canagliflozin 100 MG tablet   Commonly known as:  INVOKANA   Stopped by:  Shannan Benson MD           glipiZIDE 5 MG tablet   Commonly known as:  GLUCOTROL   Stopped by:  Shannan Benson MD                Where to get your medicines      These medications were sent to [facility]/pharmacy #[id number] - [place], [place] - [address], [place] [address]     Phone:  [phone]     BASAGLAR 100 UNIT/ML injection    insulin lispro 100 UNIT/ML injection                Primary Care Provider Office Phone # Fax #    Shannna Benson [phone] [address] [facility] [address]        Equal Access to Services     EKSHIA JULIEN : Hadii anselmo moore hadasho Soisaiasali, waaxda luqadaha, qaybta kaalmada adeegyada, kyle sroia. So [facility] [phone].    ATENCIÓN: Si habla español, tiene a mcclure disposición servicios gratuitos de asistencia lingüística. Llame al [phone].    We comply with applicable federal civil rights laws and [place] laws. We do not discriminate on the basis of race, color, national origin, age, disability, sex, sexual orientation, or gender identity.            Thank you!     Thank  you for choosing [facility]  for your care. Our goal is always to provide you with excellent care. Hearing back from our patients is one way we can continue to improve our services. Please take a few minutes to complete the written survey that you may receive in the mail after your visit with us. Thank you!             Your Updated Medication List - Protect others around you: Learn how to safely use, store and throw away your medicines at www.disposemymeds.org.          This list is accurate as of 11/12/18 12:19 PM.  Always use your most recent med list.                   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 (H)       aspirin 81 MG tablet     90 tablet    Take 1 tablet (81 mg) by mouth daily    Type 2 diabetes mellitus with diabetic polyneuropathy, with long-term current use of insulin (H)       atorvastatin 40 MG tablet    LIPITOR    90 tablet    Take 1 tablet (40 mg) by mouth daily    Pure hypercholesterolemia       BASAGLAR 100 UNIT/ML injection     45 mL    Inject 54 units SQ in PM    Type 2 diabetes mellitus with diabetic polyneuropathy, with long-term current use of insulin (H)       blood glucose monitoring lancets     100 each    Use to test blood sugars 2 times daily or as directed.    Type 2 diabetes mellitus with diabetic polyneuropathy, with long-term current use of insulin (H)       blood glucose monitoring meter device kit     1 kit    Use to test blood sugars 2 times daily or as directed.    Type 2 diabetes mellitus with diabetic polyneuropathy, with long-term current use of insulin (H)       blood glucose monitoring test strip    ONETOUCH ULTRA    200 strip    Use to test blood sugars 2 times daily or as directed.    Type 2 diabetes mellitus with diabetic  polyneuropathy, with long-term current use of insulin (H)       cholecalciferol 1000 UNIT tablet    vitamin D3    100 tablet    Take 1 tablet (1,000 Units) by mouth daily    Chronic kidney disease, stage III (moderate) (H)       insulin lispro 100 UNIT/ML injection    ADMELOG SOLOSTAR    15 mL    Inject 10 Units Subcutaneous 3 times daily (with meals)    Type 2 diabetes mellitus with diabetic polyneuropathy, with long-term current use of insulin (H)       insulin pen needle 31G X 8 MM     200 each    Use for insulin injections twice daily    Type 2 diabetes mellitus with diabetic autonomic neuropathy, with long-term current use of insulin (H)       latanoprost 0.005 % ophthalmic solution    XALATAN    2.5 mL    Apply 1 drop to eye daily Instill  1 drop into the affected eye(s) once daily as directed    Glaucoma of both eyes associated with underlying disease       liraglutide 18 MG/3ML soln    VICTOZA    27 mL    Inject 1.8 mg Subcutaneous daily    Type 2 diabetes mellitus with diabetic polyneuropathy, with long-term current use of insulin (H)       loratadine 10 MG tablet    CLARITIN    30 tablet    Take 1 tablet (10 mg) by mouth daily as needed for allergies    Seasonal allergic rhinitis, unspecified chronicity, unspecified trigger       losartan-hydrochlorothiazide 100-12.5 MG per tablet    HYZAAR    90 tablet    Take 1 tablet by mouth daily    Essential hypertension       psyllium 0.52 g capsule     90 capsule    Take 1 capsule (0.52 g) by mouth daily    Diarrhea, unspecified type       ranitidine 150 MG tablet    ZANTAC    60 tablet    Take 1 tablet (150 mg) by mouth 2 times daily as needed for heartburn    Gastroesophageal reflux disease without esophagitis       VEMLIDY 25 MG tablet   Generic drug:  tenofovir alafenamide fumarate     30 tablet    Take 1 tablet (25 mg) by mouth daily with food (dispense only in the original container).    Type 2 diabetes mellitus with diabetic polyneuropathy, with long-term  current use of insulin (H)

## 2018-11-12 NOTE — PROGRESS NOTES
Will discuss at f/u visit.stable kidney function  Stopping glipizide and invokanna, recheck next visit.

## 2018-11-12 NOTE — TELEPHONE ENCOUNTER
Called Omid via language line . Scheduled patient to be seen in clinic today at 1100hours with . Advised patient to bring glucometer to appointment. Patient verbalized understanding. Chi FELICIANO

## 2018-11-12 NOTE — PROGRESS NOTES
"SUBJECTIVE:  Patient presents with:  RECHECK: DM      1. Type 2 DM:  -sugars really high  -seems that is all the time (possibly more in the AM)  -this AM 70 units basaglar  -patient feels very tired and weak  -taking all meds without change  -feels like her insulin isn't working, she thinks it used to work when she injected and could smell something, she no longer smells that and thinks it's not working  -has new insulin and stores in the fridge      OBJECTIVE:  /75  Pulse 70  Temp 97.6  F (36.4  C)  Resp 18  Ht 4' 7\" (139.7 cm)  Wt 153 lb 3.2 oz (69.5 kg)  SpO2 95%  BMI 35.61 kg/m2    Evaluated insulin and meds and appears that is not  and appears used    Glucometer: some readings 100s and some readings 300-400. No trend    Hemoglobin A1C   Date Value Ref Range Status   2018 8.7 (H) 4.1 - 5.7 % Final         ASSESSMENT/PLAN:  (E11.42,  Z79.4) Type 2 diabetes mellitus with diabetic polyneuropathy, with long-term current use of insulin (H)  (primary encounter diagnosis)  Comment: switch to mealtime insulin, bedtime basaglar and stop oral agents due to ineffective and CKD  Plan: Basic Metabolic Panel ([facility])  - Results < 1         hr, BASAGLAR 100 UNIT/ML injection, insulin         lispro (ADMELOG SOLOSTAR) 100 UNIT/ML         injection, insulin pen needle 31G X 8 MM          Patient instructed with  on the new insulin, use only with foods  Describes always 3 meals, all very similar in food content and amount  Discussed insulin administration with food 10 units    Discussed new basaglar to switch to use 54 at bedtime and no longer BID dose.    (was on 114 units long acting daily, will now be on 30 units Novolog with 54 units Basaglar)    (N18.3) Chronic kidney disease, stage III (moderate) (H)  Comment: will stop glipizide and invokanna  Plan: Basic Metabolic Panel ([facility])  - Results < 1         hr        Reassess.    -25 mins spent with pt >50% face to face counseling on above " conditions.

## 2018-11-13 NOTE — NURSING NOTE
Due to patient being non-English speaking/uses sign language, an  was used for this visit. Only for face-to-face interpretation by an external agency, date and length of interpretation can be found on the scanned worksheet.     name: Tee [id number] (Rito)  Agency: [employer]  Language: Hmong   Telephone number:?  Type of interpretation: Telemedicine, spoken

## 2018-11-20 ENCOUNTER — OFFICE VISIT (OUTPATIENT)
Dept: FAMILY MEDICINE | Facility: CLINIC | Age: 63
End: 2018-11-20

## 2018-11-20 ENCOUNTER — TELEPHONE (OUTPATIENT)
Dept: FAMILY MEDICINE | Facility: CLINIC | Age: 63
End: 2018-11-20

## 2018-11-20 VITALS
DIASTOLIC BLOOD PRESSURE: 63 MMHG | BODY MASS INDEX: 35.05 KG/M2 | RESPIRATION RATE: 18 BRPM | HEIGHT: 56 IN | HEART RATE: 68 BPM | TEMPERATURE: 97.6 F | SYSTOLIC BLOOD PRESSURE: 99 MMHG | WEIGHT: 155.8 LBS | OXYGEN SATURATION: 96 %

## 2018-11-20 DIAGNOSIS — Z79.4 TYPE 2 DIABETES MELLITUS WITH DIABETIC POLYNEUROPATHY, WITH LONG-TERM CURRENT USE OF INSULIN (H): ICD-10-CM

## 2018-11-20 DIAGNOSIS — E11.42 TYPE 2 DIABETES MELLITUS WITH DIABETIC POLYNEUROPATHY, WITH LONG-TERM CURRENT USE OF INSULIN (H): ICD-10-CM

## 2018-11-20 RX ORDER — INSULIN GLARGINE 100 [IU]/ML
INJECTION, SOLUTION SUBCUTANEOUS
Qty: 45 ML | Refills: 3 | Status: SHIPPED | OUTPATIENT
Start: 2018-11-20 | End: 2019-03-08

## 2018-11-20 NOTE — TELEPHONE ENCOUNTER
Called pharmacy and spoke with Chasity. Advised to continue with victoza. Also advised to discontinue januvia, glipizide, and invokanna. Chasity verbalized understanding. Chi FELICIANO

## 2018-11-20 NOTE — PROGRESS NOTES
Humalog preferred rapid acting insulin per patient's insurance.     Previous Admelog Rx filled by pharmacy as Humalog.     Sent updated Rx reflecting appropriate Brand/generic name as covered by insurance.     Parul Stanley, PharmD  [facility]  Phone: [phone]  November 20, 2018 at 5:02 PM

## 2018-11-20 NOTE — NURSING NOTE
Due to patient being non-English speaking/uses sign language, an  was used for this visit. Only for face-to-face interpretation by an external agency, date and length of interpretation can be found on the scanned worksheet.     name: Sofia Moyer  Agency: [employer]  Language: Bobby   Telephone number: [phone]  Type of interpretation: Face-to-face, spoken    11/20/2018 PCS Previsit Plan   DUE FOR:  Return in 1 month for labs - 12/4/18   Foot ex - Set Up  Eye ref appt schedule yet? - Saw Dr. Lemuel Wilson at [facility] - Signed ROGELIO Faxed Destin José, SANDY  Pap -   Adv Dir  Neil Trent, SANDY/CXAndreea, NOEA

## 2018-11-20 NOTE — TELEPHONE ENCOUNTER
Patient calls today to tell us that the new insulin sent today was not covered. Patient wanting provider to send alternative covered insulin so she doesn't run.   Per Cami, guillaume and Parul spoke to pharmacy on the phone and patient somehow was given Humalog when prescribed Admelog on 11/12/2018. Unable to locate records of this approval switch to Humalog in patient's chart.   Called patient back to have her look at her insulin to see what the name was. Patient was able to spell out the name on the insulin and it was Humalog.   Humalog has now been sent to pharmacy.

## 2018-11-20 NOTE — TELEPHONE ENCOUNTER
Continue victoza, do not start januvia.    We are stopping invokanna and glipizide.    No oral medications for patient for her sugars.

## 2018-11-20 NOTE — TELEPHONE ENCOUNTER
[facility] phone call message- medication clarification/question:    Full Medication Name: Januvia and Victoza   Strength:     Have you contacted your pharmacy about this refill request?     If  Yes,  which pharmacy?    When did you contact the pharmacy?    Additional comments/concerns from call to pharmacy:    Reason for call to clinic: They received Rx for Januvia but Patient is also on Victoza so is Victoza discontinue? They want to confirm which on is Patient suppose to be on because she should only be on one or another not both? Please call and advise.       Pharmacy confirmed as [id number] - [place], [place] - [address]: Yes    OK to leave a message on voice mail?     Primary language: Hmong      needed? Yes    Call taken on November 20, 2018 at 2:20 PM by Sherwin Huff

## 2018-11-20 NOTE — PROGRESS NOTES
"SUBJECTIVE:  Patient presents with:  Diabetes: Bp Check as well - no other concerns  Medication Reconciliation: Needs attention - completed some, not all medication on list patient brought in      1. Type 2 DM:  -sugars still high, possibly better, tolerating now mealtime insulin without hypoglycemia  -self titrated the new mealtime insulin now using 16 units sometimes 17 units  -taking basaglar 50 units at bedtime  -actually stayed on the oral meds despite instructions as she noticed her sugars were still high    OBJECTIVE:  BP 99/63  Pulse 68  Temp 97.6  F (36.4  C) (Oral)  Resp 18  Ht 4' 7.51\" (141 cm)  Wt 155 lb 12.8 oz (70.7 kg)  SpO2 96%  BMI 35.55 kg/m2  Gen: alert, oriented X 3, no acute distress, no sign of discomfort      Glucometer: some readings 100s and some readings 300-400. No trend    Hemoglobin A1C   Date Value Ref Range Status   09/07/2018 8.7 (H) 4.1 - 5.7 % Final         ASSESSMENT/PLAN:  (E11.42,  Z79.4) Type 2 diabetes mellitus with diabetic polyneuropathy, with long-term current use of insulin (H)  (primary encounter diagnosis)  Comment: continue mealtime insulin, bedtime basaglar and stop oral agents due to ineffective and CKD  Plan: Basic Metabolic Panel ([facility])  - Results < 1         hr, BASAGLAR 100 UNIT/ML injection, insulin         lispro (ADMELOG SOLOSTAR) 100 UNIT/ML         injection, insulin pen needle 31G X 8 MM          Patient instructed with  on the new insulin, use only with foods  Humalog 16 units TID,   Basaglar 60 units QHS    (N18.3) Chronic kidney disease, stage III (moderate) (H)  Comment: will stop glipizide and invokanna  Plan: Basic Metabolic Panel ([facility])  - Results < 1         hr        Reassess.            "

## 2018-11-20 NOTE — MR AVS SNAPSHOT
"              After Visit Summary   11/20/2018    Omid Adorno    MRN: [id number]           Patient Information     Date Of Birth          1955        Visit Information        Provider Department      11/20/2018 11:40 AM Shannan Benson MD [facility]        Today's Diagnoses     Type 2 diabetes mellitus with diabetic polyneuropathy, with long-term current use of insulin (H)           Follow-ups after your visit        Your next 10 appointments already scheduled     Dec 04, 2018  4:20 PM CST   Return Visit with Shannan Benson MD   [facility] ([facility])    [address] [place] [address]   [phone]              Who to contact     Please call your clinic at [phone] to:    Ask questions about your health    Make or cancel appointments    Discuss your medicines    Learn about your test results    Speak to your doctor            Additional Information About Your Visit        Care EveryWhere ID     This is your Care EveryWhere ID. This could be used by other organizations to access your [place] medical records  [id number]        Your Vitals Were     Pulse Temperature Respirations Height Pulse Oximetry BMI (Body Mass Index)    68 97.6  F (36.4  C) (Oral) 18 4' 7.51\" (141 cm) 96% 35.55 kg/m2       Blood Pressure from Last 3 Encounters:   11/20/18 99/63   11/12/18 116/75   10/31/18 154/73    Weight from Last 3 Encounters:   11/20/18 155 lb 12.8 oz (70.7 kg)   11/12/18 153 lb 3.2 oz (69.5 kg)   10/31/18 156 lb (70.8 kg)              Today, you had the following     No orders found for display         Today's Medication Changes          These changes are accurate as of 11/20/18 12:31 PM.  If you have any questions, ask your nurse or doctor.               Start taking these medicines.        Dose/Directions    sitagliptin 50 MG tablet   Commonly known as:  JANUVIA   Used for:  Type 2 diabetes mellitus with diabetic polyneuropathy, with long-term current use of " insulin (H)   Started by:  Shannan Benson MD        Dose:  50 mg   Take 1 tablet (50 mg) by mouth daily   Quantity:  90 tablet   Refills:  3         These medicines have changed or have updated prescriptions.        Dose/Directions    insulin glargine 100 UNIT/ML pen   This may have changed:  additional instructions   Used for:  Type 2 diabetes mellitus with diabetic polyneuropathy, with long-term current use of insulin (H)   Changed by:  Shannan Benson MD        Inject 60 units SQ in PM   Quantity:  45 mL   Refills:  3       insulin lispro 100 UNIT/ML injection   Commonly known as:  ADMELOG SOLOSTAR   This may have changed:  how much to take   Used for:  Type 2 diabetes mellitus with diabetic polyneuropathy, with long-term current use of insulin (H)   Changed by:  Shannan Benson MD        Dose:  16-20 Units   Inject 16-20 Units Subcutaneous 3 times daily (with meals)   Quantity:  15 mL   Refills:  1            Where to get your medicines      These medications were sent to [id number] - [place], [place] - [address], [place] [address]     Phone:  [phone]     insulin glargine 100 UNIT/ML pen    insulin lispro 100 UNIT/ML injection         Call your pharmacy to confirm that your medication is ready for pickup. It may take up to 24 hours for them to receive the prescription. If the prescription is not ready within 3 business days, please contact your clinic or your provider.     We will let you know when these medications are ready. If you don't hear back within 3 business days, please contact us.     sitagliptin 50 MG tablet                Primary Care Provider Office Phone # Fax #    Shannan Benson [phone] [address] [facility] [address]        Equal Access to Services     LILY JULIEN AH: Alise Allen, millie joseph, kyle camp. So [facility]  [phone].    ATENCIÓN: Si julio west, tiene a mcclure disposición servicios gratuitos de asistencia lingüística. Gregory garcia [phone].    We comply with applicable federal civil rights laws and [place] laws. We do not discriminate on the basis of race, color, national origin, age, disability, sex, sexual orientation, or gender identity.            Thank you!     Thank you for choosing [facility]  for your care. Our goal is always to provide you with excellent care. Hearing back from our patients is one way we can continue to improve our services. Please take a few minutes to complete the written survey that you may receive in the mail after your visit with us. Thank you!             Your Updated Medication List - Protect others around you: Learn how to safely use, store and throw away your medicines at www.disposemymeds.org.          This list is accurate as of 11/20/18 12:31 PM.  Always use your most recent med list.                   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 (H)       aspirin 81 MG tablet     90 tablet    Take 1 tablet (81 mg) by mouth daily    Type 2 diabetes mellitus with diabetic polyneuropathy, with long-term current use of insulin (H)       atorvastatin 40 MG tablet    LIPITOR    90 tablet    Take 1 tablet (40 mg) by mouth daily    Pure hypercholesterolemia       blood glucose monitoring lancets     100 each    Use to test blood sugars 2 times daily or as directed.    Type 2 diabetes mellitus with diabetic polyneuropathy, with long-term current use of insulin (H)       blood glucose monitoring meter device kit     1 kit    Use to test blood sugars 2 times daily or as directed.    Type 2 diabetes mellitus with diabetic polyneuropathy, with long-term current  use of insulin (H)       blood glucose monitoring test strip    ONETOUCH ULTRA    200 strip    Use to test blood sugars 2 times daily or as directed.    Type 2 diabetes mellitus with diabetic polyneuropathy, with long-term current use of insulin (H)       cholecalciferol 1000 UNIT tablet    vitamin D3    100 tablet    Take 1 tablet (1,000 Units) by mouth daily    Chronic kidney disease, stage III (moderate) (H)       insulin glargine 100 UNIT/ML pen     45 mL    Inject 60 units SQ in PM    Type 2 diabetes mellitus with diabetic polyneuropathy, with long-term current use of insulin (H)       insulin lispro 100 UNIT/ML injection    ADMELOG SOLOSTAR    15 mL    Inject 16-20 Units Subcutaneous 3 times daily (with meals)    Type 2 diabetes mellitus with diabetic polyneuropathy, with long-term current use of insulin (H)       insulin pen needle 31G X 8 MM miscellaneous    31G X 8 MM    360 each    Use for insulin injections four times daily (with meals and bedtime)    Type 2 diabetes mellitus with diabetic polyneuropathy, with long-term current use of insulin (H)       latanoprost 0.005 % ophthalmic solution    XALATAN    2.5 mL    Apply 1 drop to eye daily Instill  1 drop into the affected eye(s) once daily as directed    Glaucoma of both eyes associated with underlying disease       liraglutide 18 MG/3ML solution    VICTOZA    27 mL    Inject 1.8 mg Subcutaneous daily    Type 2 diabetes mellitus with diabetic polyneuropathy, with long-term current use of insulin (H)       loratadine 10 MG tablet    CLARITIN    30 tablet    Take 1 tablet (10 mg) by mouth daily as needed for allergies    Seasonal allergic rhinitis, unspecified chronicity, unspecified trigger       losartan-hydrochlorothiazide 100-12.5 MG per tablet    HYZAAR    90 tablet    Take 1 tablet by mouth daily    Essential hypertension       psyllium 0.52 g capsule     90 capsule    Take 1 capsule (0.52 g) by mouth daily    Diarrhea, unspecified type        ranitidine 150 MG tablet    ZANTAC    60 tablet    Take 1 tablet (150 mg) by mouth 2 times daily as needed for heartburn    Gastroesophageal reflux disease without esophagitis       sitagliptin 50 MG tablet    JANUVIA    90 tablet    Take 1 tablet (50 mg) by mouth daily    Type 2 diabetes mellitus with diabetic polyneuropathy, with long-term current use of insulin (H)       VEMLIDY 25 MG tablet   Generic drug:  tenofovir alafenamide fumarate     30 tablet    Take 1 tablet (25 mg) by mouth daily with food (dispense only in the original container).    Type 2 diabetes mellitus with diabetic polyneuropathy, with long-term current use of insulin (H)

## 2018-12-04 ENCOUNTER — OFFICE VISIT (OUTPATIENT)
Dept: FAMILY MEDICINE | Facility: CLINIC | Age: 63
End: 2018-12-04

## 2018-12-04 VITALS
HEART RATE: 64 BPM | TEMPERATURE: 97.7 F | DIASTOLIC BLOOD PRESSURE: 74 MMHG | SYSTOLIC BLOOD PRESSURE: 132 MMHG | RESPIRATION RATE: 16 BRPM | WEIGHT: 153.8 LBS | HEIGHT: 56 IN | BODY MASS INDEX: 34.6 KG/M2 | OXYGEN SATURATION: 98 %

## 2018-12-04 DIAGNOSIS — E11.42 TYPE 2 DIABETES MELLITUS WITH DIABETIC POLYNEUROPATHY, WITH LONG-TERM CURRENT USE OF INSULIN (H): Primary | ICD-10-CM

## 2018-12-04 DIAGNOSIS — Z11.4 SCREENING FOR HIV (HUMAN IMMUNODEFICIENCY VIRUS): ICD-10-CM

## 2018-12-04 DIAGNOSIS — Z79.4 TYPE 2 DIABETES MELLITUS WITH DIABETIC POLYNEUROPATHY, WITH LONG-TERM CURRENT USE OF INSULIN (H): Primary | ICD-10-CM

## 2018-12-04 LAB
HBA1C MFR BLD: 9.6 % (ref 4.1–5.7)
HIV 1+2 AB+HIV1 P24 AG SERPL QL IA: NEGATIVE

## 2018-12-04 NOTE — PROGRESS NOTES
"SUBJECTIVE:  Patient presents with:  Diabetes: No other concerns  Medication Reconciliation: Needs attention - Pt. did not bring all insulin with      1. Type 2 DM:  -sugars still high, possibly better, tolerating now mealtime insulin without hypoglycemia  -self titrated the new mealtime insulin now using 16 units sometimes 17 units  -taking basaglar 50 units at bedtime  -actually stayed on the oral meds despite instructions as she noticed her sugars were still high    OBJECTIVE:  /74  Pulse 64  Temp 97.7  F (36.5  C) (Oral)  Resp 16  Ht 4' 7.51\" (141 cm)  Wt 153 lb 12.8 oz (69.8 kg)  SpO2 98%  BMI 35.09 kg/m2  Gen: alert, oriented X 3, no acute distress, no sign of discomfort      Glucometer: some readings 100s and some readings 300-400. No trend    Hemoglobin A1C   Date Value Ref Range Status   12/04/2018 9.6 (H) 4.1 - 5.7 % Final         ASSESSMENT/PLAN:  (E11.42,  Z79.4) Type 2 diabetes mellitus with diabetic polyneuropathy, with long-term current use of insulin (H)  (primary encounter diagnosis)  Comment: not controlled, but improving based on current glucose readings  Plan: Hemoglobin A1c ([facility]), C FOOT EXAM  NO CHARGE        Discussed how to change humalog to have some correction for premeal elevated glucose or low glucose, recheck in 3 months    (Z11.4) Screening for HIV (human immunodeficiency virus)  Comment: anticipate negative result  Plan: HIV Ag/Ab Screen [place] ([facility])              "

## 2018-12-04 NOTE — MR AVS SNAPSHOT
"              After Visit Summary   12/4/2018    Omid Adorno    MRN: [id number]           Patient Information     Date Of Birth          1955        Visit Information        Provider Department      12/4/2018 4:20 PM Shannan Benson MD [facility]        Today's Diagnoses     Type 2 diabetes mellitus with diabetic polyneuropathy, with long-term current use of insulin (H)    -  1    Screening for HIV (human immunodeficiency virus)           Follow-ups after your visit        Who to contact     Please call your clinic at [phone] to:    Ask questions about your health    Make or cancel appointments    Discuss your medicines    Learn about your test results    Speak to your doctor            Additional Information About Your Visit        Care EveryWhere ID     This is your Care EveryWhere ID. This could be used by other organizations to access your [place] medical records  [id number]        Your Vitals Were     Pulse Temperature Respirations Height Pulse Oximetry BMI (Body Mass Index)    64 97.7  F (36.5  C) (Oral) 16 4' 7.51\" (141 cm) 98% 35.09 kg/m2       Blood Pressure from Last 3 Encounters:   12/04/18 132/74   11/20/18 99/63   11/12/18 116/75    Weight from Last 3 Encounters:   12/04/18 153 lb 12.8 oz (69.8 kg)   11/20/18 155 lb 12.8 oz (70.7 kg)   11/12/18 153 lb 3.2 oz (69.5 kg)              We Performed the Following     C FOOT EXAM  NO CHARGE     Hemoglobin A1c ([facility])     HIV Ag/Ab Screen [place] ([facility])        Primary Care Provider Office Phone # Fax #    Shannan Benson [phone]       [facility] [address]        Equal Access to Services     [facility] CenterBENEDICT AH: Hadii anselmo Allen, waaxda luqadaha, qaybta kaalmakyle feliz. So [facility] [phone].    ATENCIÓN: Si habla español, tiene a mcclure disposición servicios gratuitos de asistencia lingüística. Llame al [phone].    We comply with " applicable federal civil rights laws and [place] laws. We do not discriminate on the basis of race, color, national origin, age, disability, sex, sexual orientation, or gender identity.            Thank you!     Thank you for choosing [facility]  for your care. Our goal is always to provide you with excellent care. Hearing back from our patients is one way we can continue to improve our services. Please take a few minutes to complete the written survey that you may receive in the mail after your visit with us. Thank you!             Your Updated Medication List - Protect others around you: Learn how to safely use, store and throw away your medicines at www.disposemymeds.org.          This list is accurate as of 12/4/18  5:14 PM.  Always use your most recent med list.                   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 (H)       aspirin 81 MG tablet    ASA    90 tablet    Take 1 tablet (81 mg) by mouth daily    Type 2 diabetes mellitus with diabetic polyneuropathy, with long-term current use of insulin (H)       atorvastatin 40 MG tablet    LIPITOR    90 tablet    Take 1 tablet (40 mg) by mouth daily    Pure hypercholesterolemia       blood glucose monitoring lancets     100 each    Use to test blood sugars 2 times daily or as directed.    Type 2 diabetes mellitus with diabetic polyneuropathy, with long-term current use of insulin (H)       blood glucose monitoring meter device kit     1 kit    Use to test blood sugars 2 times daily or as directed.    Type 2 diabetes mellitus with diabetic polyneuropathy, with long-term current use of insulin (H)       blood glucose monitoring test strip    ONETOUCH ULTRA    200 strip    Use to test blood sugars 2 times daily or as directed.     Type 2 diabetes mellitus with diabetic polyneuropathy, with long-term current use of insulin (H)       insulin glargine 100 UNIT/ML pen     45 mL    Inject 60 units SQ in PM    Type 2 diabetes mellitus with diabetic polyneuropathy, with long-term current use of insulin (H)       insulin lispro 100 UNIT/ML pen    HumaLOG KWIKpen    15 mL    Inject 16-20 Units Subcutaneous 3 times daily (with meals    Type 2 diabetes mellitus with diabetic polyneuropathy, with long-term current use of insulin (H)       insulin pen needle 31G X 8 MM miscellaneous    31G X 8 MM    360 each    Use for insulin injections four times daily (with meals and bedtime)    Type 2 diabetes mellitus with diabetic polyneuropathy, with long-term current use of insulin (H)       latanoprost 0.005 % ophthalmic solution    XALATAN    2.5 mL    Apply 1 drop to eye daily Instill  1 drop into the affected eye(s) once daily as directed    Glaucoma of both eyes associated with underlying disease       liraglutide 18 MG/3ML solution    VICTOZA    27 mL    Inject 1.8 mg Subcutaneous daily    Type 2 diabetes mellitus with diabetic polyneuropathy, with long-term current use of insulin (H)       loratadine 10 MG tablet    CLARITIN    30 tablet    Take 1 tablet (10 mg) by mouth daily as needed for allergies    Seasonal allergic rhinitis, unspecified chronicity, unspecified trigger       losartan-hydrochlorothiazide 100-12.5 MG tablet    HYZAAR    90 tablet    Take 1 tablet by mouth daily    Essential hypertension       psyllium 0.52 g capsule    METAMUCIL/KONSYL    90 capsule    Take 1 capsule (0.52 g) by mouth daily    Diarrhea, unspecified type       ranitidine 150 MG tablet    ZANTAC    60 tablet    Take 1 tablet (150 mg) by mouth 2 times daily as needed for heartburn    Gastroesophageal reflux disease without esophagitis       sitagliptin 50 MG tablet    JANUVIA    90 tablet    Take 1 tablet (50 mg) by mouth daily    Type 2 diabetes mellitus with diabetic  polyneuropathy, with long-term current use of insulin (H)       VEMLIDY 25 MG tablet   Generic drug:  tenofovir alafenamide fumarate     30 tablet    Take 1 tablet (25 mg) by mouth daily with food (dispense only in the original container).    Type 2 diabetes mellitus with diabetic polyneuropathy, with long-term current use of insulin (H)       vitamin D3 1000 units (25 mcg) tablet    CHOLECALCIFEROL    100 tablet    Take 1 tablet (1,000 Units) by mouth daily    Chronic kidney disease, stage III (moderate) (H)

## 2018-12-05 ENCOUNTER — TRANSFERRED RECORDS (OUTPATIENT)
Dept: HEALTH INFORMATION MANAGEMENT | Facility: CLINIC | Age: 63
End: 2018-12-05

## 2018-12-17 ENCOUNTER — TELEPHONE (OUTPATIENT)
Dept: FAMILY MEDICINE | Facility: CLINIC | Age: 63
End: 2018-12-17

## 2018-12-17 NOTE — TELEPHONE ENCOUNTER
[facility] phone call message- general phone call:    Reason for call: Calling to let the doctor know that she will be faxing over most recent visit notes and xray today for her active from 2017. Patient refused treatment and has been monitor for follow up every 6 months. Patient had an Xray done on 12/5 and needed a CT done but she is refusing to get it done but she says if PCP says she should get it then she might considered it, so that is why they are calling the clinic. Please call and advise.     Return call needed: Yes    OK to leave a message on voice mail?     Primary language: Hmong      needed? Yes    Call taken on December 17, 2018 at 12:08 PM by Sherwin uHff

## 2018-12-19 NOTE — TELEPHONE ENCOUNTER
Calling stating they had fax notes to the clinic and wondering if Dr. Abrams had agreed for Patient to get a chest CT and if it had been scheduled? Patient had declined Chest CT. Please call and advise.

## 2018-12-20 NOTE — TELEPHONE ENCOUNTER
"Called patient with the use of language line  to discuss reservations for obtaining a CT. Advised patient  highly recommends having a CT for proper visualization. Explained the disadvantages of only having a chest xray, and inability to see details necessary for proper treatment. Patient stated she does not want a CT r/t having \"almost \" last time. Patient described having to be pulled out of the CT machine last time, and patient endorses it was a scary experience for her. Patient stated she is not willing to risk her life again for a CT. Provided reassurance to patient about the safety of the imaging, walked patient through the steps of a CT scan, and offered requesting medication to assist with anxiety. Patient again declined the CT and stated she will never have a CT again. Offered reassurance and advised patient to call the clinic if she has any questions, concerns, or if she decides to go through with the CT. Patient verbalized understanding. Called [facility] clinic to discuss patient declining CT. No answer, left VM on confidential VM to call the clinic back regarding the patient. Chi FELICIANO  "

## 2018-12-21 NOTE — TELEPHONE ENCOUNTER
Sherrell returned phone call. Updated Sherrell on patient declining CT. Sherrell verbalized understanding and stated she will discuss with TB clinic physician. Chi FELICIANO

## 2019-01-02 ENCOUNTER — TELEPHONE (OUTPATIENT)
Dept: FAMILY MEDICINE | Facility: CLINIC | Age: 64
End: 2019-01-02

## 2019-01-02 DIAGNOSIS — K21.9 GASTROESOPHAGEAL REFLUX DISEASE WITHOUT ESOPHAGITIS: ICD-10-CM

## 2019-01-02 NOTE — TELEPHONE ENCOUNTER
[facility] Family Medicine phone call message- general phone call:    Reason for call: Patient is calling to speak to Juliet regarding her ultrasound appt she has coming up on 1/20. She states she received a letter letting her know the appt date but no time was given so she wants Juliet to call her to let her know the time of the appt and if any prepping is needed. Told her that Juliet if out today. She wants Juliet to call her since Juliet and Dr. Benson are the only ones that know about it. Please call and advise.     Return call needed: Yes    OK to leave a message on voice mail?     Primary language: ong      needed? Yes    Call taken on January 2, 2019 at 1:13 PM by Sherwin Huff

## 2019-01-03 ENCOUNTER — RECORDS - HEALTHEAST (OUTPATIENT)
Dept: ADMINISTRATIVE | Facility: OTHER | Age: 64
End: 2019-01-03

## 2019-01-03 NOTE — TELEPHONE ENCOUNTER
Called patient back and schedule appointment with Hep B /DESHAUN     1 First appointment for liver ultrasound in 02/01/2019 at 7 :45 AM at [facility]  2- Appointment for lab draw only at the [facility]/offiec 02/21/2019 at 8:00 AM  3-Appointment for office visit in 03/01/2019 at 8:10 AM

## 2019-01-04 ENCOUNTER — RECORDS - HEALTHEAST (OUTPATIENT)
Dept: ADMINISTRATIVE | Facility: OTHER | Age: 64
End: 2019-01-04

## 2019-01-14 DIAGNOSIS — E11.42 TYPE 2 DIABETES MELLITUS WITH DIABETIC POLYNEUROPATHY, WITH LONG-TERM CURRENT USE OF INSULIN (H): ICD-10-CM

## 2019-01-14 DIAGNOSIS — Z79.4 TYPE 2 DIABETES MELLITUS WITH DIABETIC POLYNEUROPATHY, WITH LONG-TERM CURRENT USE OF INSULIN (H): ICD-10-CM

## 2019-01-14 NOTE — TELEPHONE ENCOUNTER
Message to physician: OUT OF INSULIN NEED ASAP PER PATIENT.    Date of last visit: 12/4/2018     Date of next visit if scheduled: Visit date not found      Last Comprehensive Metabolic Panel:  Sodium   Date Value Ref Range Status   11/12/2018 139.0 133.0 - 144.0 mmol/L Final     Potassium   Date Value Ref Range Status   11/12/2018 4.4 3.4 - 5.3 mmol/L Final     Chloride   Date Value Ref Range Status   11/12/2018 103.0 94.0 - 109.0 mmol/L Final     Carbon Dioxide   Date Value Ref Range Status   11/12/2018 26.0 20.0 - 32.0 mmol/L Final     Glucose   Date Value Ref Range Status   11/12/2018 239.0 (H) 60.0 - 109.0 mg/dL Final     Urea Nitrogen   Date Value Ref Range Status   11/12/2018 53.0 (H) 7.0 - 30.0 mg/dL Final     Creatinine   Date Value Ref Range Status   11/12/2018 1.6 (H) 0.6 - 1.3 mg/dL Final     GFR Estimate   Date Value Ref Range Status   07/19/2017 40 (L) >60 mL/min/1.73m2 Final     Calcium   Date Value Ref Range Status   11/12/2018 9.5 8.5 - 10.4 mg/dL Final       BP Readings from Last 3 Encounters:   12/04/18 132/74   11/20/18 99/63   11/12/18 116/75       Lab Results   Component Value Date    A1C 9.6 12/04/2018    A1C 8.7 09/07/2018    A1C 7.8 05/09/2018    A1C 7.9 02/06/2018    A1C 10.0 10/25/2017                Please complete refill and CLOSE ENCOUNTER.  Closing the encounter signifies the refill is complete.

## 2019-01-17 ENCOUNTER — HOSPITAL ENCOUNTER (OUTPATIENT)
Dept: CT IMAGING | Facility: HOSPITAL | Age: 64
Discharge: HOME | End: 2019-01-17
Attending: INTERPRETER

## 2019-01-17 ENCOUNTER — COMMUNICATION - HEALTHEAST (OUTPATIENT)
Dept: TELEHEALTH | Facility: CLINIC | Age: 64
End: 2019-01-17

## 2019-01-17 DIAGNOSIS — R93.89 ABNORMAL CXR: ICD-10-CM

## 2019-01-31 DIAGNOSIS — Z79.4 TYPE 2 DIABETES MELLITUS WITH DIABETIC POLYNEUROPATHY, WITH LONG-TERM CURRENT USE OF INSULIN (H): ICD-10-CM

## 2019-01-31 DIAGNOSIS — E11.42 TYPE 2 DIABETES MELLITUS WITH DIABETIC POLYNEUROPATHY, WITH LONG-TERM CURRENT USE OF INSULIN (H): ICD-10-CM

## 2019-02-01 ENCOUNTER — HOSPITAL ENCOUNTER (OUTPATIENT)
Dept: ULTRASOUND IMAGING | Facility: HOSPITAL | Age: 64
Discharge: HOME | End: 2019-02-01
Attending: INTERNAL MEDICINE

## 2019-02-01 ENCOUNTER — TRANSFERRED RECORDS (OUTPATIENT)
Dept: HEALTH INFORMATION MANAGEMENT | Facility: CLINIC | Age: 64
End: 2019-02-01

## 2019-02-01 DIAGNOSIS — B18.1 CHRONIC VIRAL HEPATITIS B WITHOUT DELTA AGENT AND WITHOUT COMA (H): ICD-10-CM

## 2019-02-01 DIAGNOSIS — B18.1 HEPATITIS B CARRIER (H): ICD-10-CM

## 2019-02-19 DIAGNOSIS — H42 GLAUCOMA OF BOTH EYES ASSOCIATED WITH UNDERLYING DISEASE: ICD-10-CM

## 2019-02-19 RX ORDER — LATANOPROST 50 UG/ML
1 SOLUTION/ DROPS OPHTHALMIC DAILY
Qty: 2.5 ML | Refills: 3 | Status: SHIPPED | OUTPATIENT
Start: 2019-02-19 | End: 2019-04-29

## 2019-02-22 ENCOUNTER — TRANSFERRED RECORDS (OUTPATIENT)
Dept: HEALTH INFORMATION MANAGEMENT | Facility: CLINIC | Age: 64
End: 2019-02-22

## 2019-03-01 ENCOUNTER — TRANSFERRED RECORDS (OUTPATIENT)
Dept: HEALTH INFORMATION MANAGEMENT | Facility: CLINIC | Age: 64
End: 2019-03-01

## 2019-03-07 NOTE — PROGRESS NOTES
"SUBJECTIVE:  Patient presents with:  Diabetes: f/u  Medication Reconciliation: not completed      1. Type 2 DM:  -sugars still high, possibly better, tolerating now mealtime insulin without hypoglycemia  -self titrated the new mealtime insulin now using 22  -taking basaglar 60 units at bedtime  -victoza  --never feels low    2. CKD:  -saw [facility] and they were suggesting nephrology referral    OBJECTIVE:  /74   Pulse 64   Temp 97.9  F (36.6  C) (Oral)   Resp 16   Ht 1.405 m (4' 7.32\")   Wt 67.6 kg (149 lb)   SpO2 99%   BMI 34.24 kg/m    Gen: alert, oriented X 3, no acute distress, no sign of discomfort  LUNGS: CTAB, no wheezing, no rales, no crackles, no accessory muscle use  COR: normal rate, regular rhythm  -lower extremities : no edema  Diabetic Foot Screen:  Any complaints of increased pain or numbness ? No  Is there a foot ulcer now or a history of foot ulcer? No  Does the foot have an abnormal shape? No  Are the nails thick, too long or ingrown?  YES some thickening of the small nails  Are there any redness or open areas? No         Sensation Testing done at all points on the diagram with monofilament     Right Foot: Sensation Normal at all points  Left Foot: Sensation Normal at all points     Risk Category: 0- No loss of protective sensation  Performed by Shannan Benson MD    Results from the last 24 hours   Results for orders placed or performed in visit on 03/08/19 (from the past 24 hour(s))   Hemoglobin A1c (LabDAQ)   Result Value Ref Range    Hemoglobin A1C 9.7 (H) 4.1 - 5.7 %   Lipid Panel (LabDAQ)   Result Value Ref Range    Cholesterol 115.0 <200.0 mg/dL    Triglycerides 140.0 <150.0 mg/dL    HDL Cholesterol 39.0 (L) >50.0 mg/dL    VLDL-Cholesterol 28.0 7.0 - 32.0 mg/dL    LDL Cholesterol Direct 49.0 0.0 - 99.0 mg/dL    Cholesterol/HDL Ratio 3.0 <5.0 RATIO   Basic Metabolic Panel ([facility])  - Results < 1 hr   Result Value Ref Range    Glucose 141.0 (H) 60.0 - 109.0 mg/dL    Urea Nitrogen " 31.0 (H) 7.0 - 30.0 mg/dL    Creatinine 1.7 (H) 0.6 - 1.3 mg/dL    Sodium 145.0 (H) 133.0 - 144.0 mmol/L    Potassium 4.4 3.4 - 5.3 mmol/L    Chloride 107.0 94.0 - 109.0 mmol/L    Carbon Dioxide 25.0 20.0 - 32.0 mmol/L    Calcium 9.8 8.5 - 10.4 mg/dL    eGFR Calculated (Non Black Reference) 32.3 (L) >60.0 mL/min    eGFR Calculated (Black Reference) 39.0 (L) >60.0 mL/min         Glucometer: some readings 100s and some readings 300-400. One 70, one 500    ASSESSMENT/PLAN:  (E11.42,  Z79.4) Type 2 diabetes mellitus with diabetic polyneuropathy, with long-term current use of insulin (H)  (primary encounter diagnosis)  Comment: reviewed still uncontrolled  Plan: Hemoglobin A1c (Lab[facility]), Ophthalmology Adult         Referral, Lipid Panel ([facility]DAQ), C FOOT EXAM  NO        CHARGE, insulin lispro (HUMALOG KWIKPEN) 100         UNIT/ML pen, insulin glargine U-300 (TOUJEO MAX        SOLOSTAR) 300 UNIT/ML injection          Change insulin:  Switch from basaglar to toujeo and increase to 70 units at bedtime    Humalog change from 22 units at meals to titrate first to 26 units and then if still high sugars 30 units    Recheck 3 months    (N18.3) Chronic kidney disease, stage III (moderate) (H)  Comment: continue to monitor appears stable not yet stage IV  Plan: Basic Metabolic Panel ([facility])  - Results < 1         hr        Continue BP control.    HTN: still elevated, will add low dose norvasc 2.5 mg daily    Recheck BP and kidneys in 3 months.

## 2019-03-08 ENCOUNTER — TELEPHONE (OUTPATIENT)
Dept: FAMILY MEDICINE | Facility: CLINIC | Age: 64
End: 2019-03-08

## 2019-03-08 ENCOUNTER — OFFICE VISIT (OUTPATIENT)
Dept: FAMILY MEDICINE | Facility: CLINIC | Age: 64
End: 2019-03-08
Payer: COMMERCIAL

## 2019-03-08 VITALS
OXYGEN SATURATION: 99 % | HEIGHT: 55 IN | RESPIRATION RATE: 16 BRPM | BODY MASS INDEX: 34.48 KG/M2 | HEART RATE: 64 BPM | SYSTOLIC BLOOD PRESSURE: 147 MMHG | WEIGHT: 149 LBS | TEMPERATURE: 97.9 F | DIASTOLIC BLOOD PRESSURE: 74 MMHG

## 2019-03-08 DIAGNOSIS — E11.42 TYPE 2 DIABETES MELLITUS WITH DIABETIC POLYNEUROPATHY, WITH LONG-TERM CURRENT USE OF INSULIN (H): Primary | ICD-10-CM

## 2019-03-08 DIAGNOSIS — N18.30 CHRONIC KIDNEY DISEASE, STAGE III (MODERATE) (H): ICD-10-CM

## 2019-03-08 DIAGNOSIS — Z79.4 TYPE 2 DIABETES MELLITUS WITH DIABETIC POLYNEUROPATHY, WITH LONG-TERM CURRENT USE OF INSULIN (H): Primary | ICD-10-CM

## 2019-03-08 DIAGNOSIS — I10 ESSENTIAL HYPERTENSION: ICD-10-CM

## 2019-03-08 LAB
BUN SERPL-MCNC: 31 MG/DL (ref 7–30)
CALCIUM SERPL-MCNC: 9.8 MG/DL (ref 8.5–10.4)
CHLORIDE SERPLBLD-SCNC: 107 MMOL/L (ref 94–109)
CHOLEST SERPL-MCNC: 115 MG/DL
CHOLEST/HDLC SERPL: 3 RATIO
CO2 SERPL-SCNC: 25 MMOL/L (ref 20–32)
CREAT SERPL-MCNC: 1.7 MG/DL (ref 0.6–1.3)
EGFR CALCULATED (BLACK REFERENCE): 39 ML/MIN
EGFR CALCULATED (NON BLACK REFERENCE): 32.3 ML/MIN
GLUCOSE SERPL-MCNC: 141 MG/DL (ref 60–109)
HBA1C MFR BLD: 9.7 % (ref 4.1–5.7)
HDLC SERPL-MCNC: 39 MG/DL
LDLC SERPL CALC-MCNC: 49 MG/DL (ref 0–99)
POTASSIUM SERPL-SCNC: 4.4 MMOL/L (ref 3.4–5.3)
SODIUM SERPL-SCNC: 145 MMOL/L (ref 133–144)
TRIGL SERPL-MCNC: 140 MG/DL
VLDL-CHOLESTEROL: 28 MG/DL (ref 7–32)

## 2019-03-08 RX ORDER — AMLODIPINE BESYLATE 2.5 MG/1
2.5 TABLET ORAL DAILY
Qty: 90 TABLET | Refills: 1 | Status: SHIPPED | OUTPATIENT
Start: 2019-03-08 | End: 2019-07-29

## 2019-03-08 ASSESSMENT — MIFFLIN-ST. JEOR: SCORE: 1077.99

## 2019-03-08 NOTE — TELEPHONE ENCOUNTER
Prior Authorization needed on:  3/8/19    Medication:  Toujeo Dose:  300    Pharmacy confirmed as   [id number] - [place], [place] - [address]  [address] [place] [address]  Phone: [phone] Fax: [phone]  : Yes    Insurance Name:  [employer]  Insurance Phone: [phone]  Insurance Patient ID: [id number]    Alternatives Suggested:  rikki Fortune March 8, 2019 at 8:53 AM

## 2019-03-08 NOTE — RESULT ENCOUNTER NOTE
Informed at visit, Recheck in   Increasing novolog with meals from 22 to titrate first to 26 then 30  Increasing basaglar (switch to toujeo) from 60 units to 70 units

## 2019-03-08 NOTE — NURSING NOTE
Due to patient being non-English speaking/uses sign language, an  was used for this visit. Only for face-to-face interpretation by an external agency, date and length of interpretation can be found on the scanned worksheet.     name: madhuri dumont  Agency: [employer]  Language: Bobby   Telephone number: [phone]  Type of interpretation: Face-to-face, spoken     Foot exam- ordered and set up   Eye Exam- ordered   FIT test- decined today   A1c, HIV, Lipid- ok with a1c and lipid

## 2019-03-15 NOTE — TELEPHONE ENCOUNTER
Has been on   Victoza 1.8 daily  Humalog 20-30 units TID with meals and   Basaglar/lantus 70 units at bedtime    Trial of humulin 70/30 45-60 units BID in 2013    lantus 40-60 units BID 2016-18    Has CKD stage 3-4 and avoiding oral agents

## 2019-03-20 NOTE — TELEPHONE ENCOUNTER
Prior Authorization: Approved    Approved as of: 2/13/19 -03/14/2020    Case ID: [id number]    LING MUNROE March 20, 2019 at 11:38 AM

## 2019-03-25 ENCOUNTER — AMBULATORY - HEALTHEAST (OUTPATIENT)
Dept: OTHER | Facility: CLINIC | Age: 64
End: 2019-03-25

## 2019-03-25 ENCOUNTER — DOCUMENTATION ONLY (OUTPATIENT)
Dept: OTHER | Facility: CLINIC | Age: 64
End: 2019-03-25

## 2019-04-01 DIAGNOSIS — Z79.4 TYPE 2 DIABETES MELLITUS WITH DIABETIC POLYNEUROPATHY, WITH LONG-TERM CURRENT USE OF INSULIN (H): ICD-10-CM

## 2019-04-01 DIAGNOSIS — E11.42 TYPE 2 DIABETES MELLITUS WITH DIABETIC POLYNEUROPATHY, WITH LONG-TERM CURRENT USE OF INSULIN (H): ICD-10-CM

## 2019-04-01 RX ORDER — LIRAGLUTIDE 6 MG/ML
1.8 INJECTION SUBCUTANEOUS DAILY
Qty: 27 ML | Refills: 3 | Status: SHIPPED | OUTPATIENT
Start: 2019-04-01 | End: 2019-11-22

## 2019-04-02 ENCOUNTER — TELEPHONE (OUTPATIENT)
Dept: FAMILY MEDICINE | Facility: CLINIC | Age: 64
End: 2019-04-02

## 2019-04-02 NOTE — TELEPHONE ENCOUNTER
Caller states prior authorization for liraglutide (VICTOZA) was approved. She states a fax will be sent to [place] stating medication was approved. Approval for medication begins on 03/01/2019 - 04/01/2022.

## 2019-04-29 DIAGNOSIS — M05.742 RHEUMATOID ARTHRITIS INVOLVING BOTH HANDS WITH POSITIVE RHEUMATOID FACTOR (H): ICD-10-CM

## 2019-04-29 DIAGNOSIS — H42 GLAUCOMA OF BOTH EYES ASSOCIATED WITH UNDERLYING DISEASE: ICD-10-CM

## 2019-04-29 DIAGNOSIS — M05.741 RHEUMATOID ARTHRITIS INVOLVING BOTH HANDS WITH POSITIVE RHEUMATOID FACTOR (H): ICD-10-CM

## 2019-04-29 RX ORDER — LATANOPROST 50 UG/ML
1 SOLUTION/ DROPS OPHTHALMIC DAILY
Qty: 2.5 ML | Refills: 3 | Status: SHIPPED | OUTPATIENT
Start: 2019-04-29 | End: 2019-11-22

## 2019-04-29 RX ORDER — ACETAMINOPHEN 325 MG/1
325-650 TABLET ORAL EVERY 4 HOURS PRN
Qty: 100 TABLET | Refills: 3 | Status: SHIPPED | OUTPATIENT
Start: 2019-04-29 | End: 2019-06-11

## 2019-05-13 ENCOUNTER — TELEPHONE (OUTPATIENT)
Dept: FAMILY MEDICINE | Facility: CLINIC | Age: 64
End: 2019-05-13

## 2019-05-13 NOTE — TELEPHONE ENCOUNTER
[facility] Family Medicine phone call message- general phone call:    Reason for call: Caller states patient was re-exposed to TB in December 2018. She states the patient falls under the 6 month guideline and will need an xray in June 2019.     Return call needed: No    OK to leave a message on voice mail? Yes    Primary language: Hmong      needed? Yes    Call taken on May 13, 2019 at 1:54 PM by Annie Flores

## 2019-06-05 ENCOUNTER — TRANSFERRED RECORDS (OUTPATIENT)
Dept: HEALTH INFORMATION MANAGEMENT | Facility: CLINIC | Age: 64
End: 2019-06-05

## 2019-06-07 ENCOUNTER — TELEPHONE (OUTPATIENT)
Dept: FAMILY MEDICINE | Facility: CLINIC | Age: 64
End: 2019-06-07

## 2019-06-07 NOTE — TELEPHONE ENCOUNTER
----- Message from Ezequiel Price CMA sent at 4/15/2019  2:06 PM CDT -----  Regarding: Call for reminder appt 6.11.2019 6.11.2019 at 8am with Marcelino

## 2019-06-10 NOTE — PROGRESS NOTES
"SUBJECTIVE:  Patient presents with:  RECHECK: Here for DM check and check medicaation  Medication Reconciliation: Completed      1. Type 2 DM:  Now on toujeo and at 60 units at bedtime    Humalog now at 26 units and usually 3 or even 4 meals per day  Never feels low or highs and not checking sugars during the day  -victoza  --never feels low    HM  Does not want mammo or FIT testing this year but agrees to get next year    OBJECTIVE:  /66 (BP Location: Right arm, Patient Position: Sitting, Cuff Size: Adult Regular)   Pulse 57   Temp 97.4  F (36.3  C) (Oral)   Resp 16   Ht 1.51 m (4' 11.45\")   Wt 69.1 kg (152 lb 4 oz)   SpO2 98%   BMI 30.29 kg/m    Gen: alert, oriented X 3, no acute distress, no sign of discomfort  LUNGS: CTAB, no wheezing, no rales, no crackles, no accessory muscle use  COR: normal rate, regular rhythm bradycardic   -lower extremities : no edema    Results from the last 24 hours   Results for orders placed or performed in visit on 06/11/19 (from the past 24 hour(s))   Hemoglobin A1c (LabDAQ)   Result Value Ref Range    Hemoglobin A1C 9.6 (H) 4.1 - 5.7 %   Basic Metabolic Panel ([facility])  - Results < 1 hr   Result Value Ref Range    Glucose 109.0 60.0 - 109.0 mg/dL    Urea Nitrogen 47.0 (H) 7.0 - 30.0 mg/dL    Creatinine 1.7 (H) 0.6 - 1.3 mg/dL    Sodium 142.0 133.0 - 144.0 mmol/L    Potassium 3.9 3.4 - 5.3 mmol/L    Chloride 105.0 94.0 - 109.0 mmol/L    Carbon Dioxide 25.0 20.0 - 32.0 mmol/L    Calcium 9.2 8.5 - 10.4 mg/dL    eGFR Calculated (Non Black Reference) 32.3 (L) >60.0 mL/min    eGFR Calculated (Black Reference) 39.0 (L) >60.0 mL/min     ASSESSMENT/PLAN:  (E11.42,  Z79.4) Type 2 diabetes mellitus with diabetic polyneuropathy, with long-term current use of insulin (H)  (primary encounter diagnosis)  Comment: no significant change will adjust insulin  Plan: Hemoglobin A1c (LabDAQ)        Increase toujeo from 60 to 65 units        Increase humalog from 26 units to 30 units  Will " switch insulin brands as COREY changing to novolog in July when uses up her current supply of humalog    (N18.3) Chronic kidney disease, stage III (moderate) (H)  Comment: 3B  Plan: Basic Metabolic Panel ([facility])  - Results < 1         hr        Stable, work on BP control and DM control to prevent further progression    (M05.741,  M05.742) Rheumatoid arthritis involving both hands with positive rheumatoid factor (H)  Comment: refill  Plan: acetaminophen (TYLENOL) 325 MG tablet            (R00.1) Bradycardia  Comment: new compared to all previous  Plan: discussed signs and symptoms, fatigue, fainting, will return with any change    Recheck in 3 months

## 2019-06-11 ENCOUNTER — OFFICE VISIT (OUTPATIENT)
Dept: FAMILY MEDICINE | Facility: CLINIC | Age: 64
End: 2019-06-11
Payer: COMMERCIAL

## 2019-06-11 VITALS
SYSTOLIC BLOOD PRESSURE: 141 MMHG | OXYGEN SATURATION: 98 % | BODY MASS INDEX: 30.69 KG/M2 | RESPIRATION RATE: 16 BRPM | TEMPERATURE: 97.4 F | WEIGHT: 152.25 LBS | DIASTOLIC BLOOD PRESSURE: 66 MMHG | HEIGHT: 59 IN | HEART RATE: 57 BPM

## 2019-06-11 DIAGNOSIS — M05.742 RHEUMATOID ARTHRITIS INVOLVING BOTH HANDS WITH POSITIVE RHEUMATOID FACTOR (H): ICD-10-CM

## 2019-06-11 DIAGNOSIS — M05.741 RHEUMATOID ARTHRITIS INVOLVING BOTH HANDS WITH POSITIVE RHEUMATOID FACTOR (H): ICD-10-CM

## 2019-06-11 DIAGNOSIS — Z79.4 TYPE 2 DIABETES MELLITUS WITH DIABETIC POLYNEUROPATHY, WITH LONG-TERM CURRENT USE OF INSULIN (H): Primary | ICD-10-CM

## 2019-06-11 DIAGNOSIS — R00.1 BRADYCARDIA: ICD-10-CM

## 2019-06-11 DIAGNOSIS — N18.30 CHRONIC KIDNEY DISEASE, STAGE III (MODERATE) (H): ICD-10-CM

## 2019-06-11 DIAGNOSIS — E11.42 TYPE 2 DIABETES MELLITUS WITH DIABETIC POLYNEUROPATHY, WITH LONG-TERM CURRENT USE OF INSULIN (H): Primary | ICD-10-CM

## 2019-06-11 LAB
BUN SERPL-MCNC: 47 MG/DL (ref 7–30)
CALCIUM SERPL-MCNC: 9.2 MG/DL (ref 8.5–10.4)
CHLORIDE SERPLBLD-SCNC: 105 MMOL/L (ref 94–109)
CO2 SERPL-SCNC: 25 MMOL/L (ref 20–32)
CREAT SERPL-MCNC: 1.7 MG/DL (ref 0.6–1.3)
EGFR CALCULATED (BLACK REFERENCE): 39 ML/MIN
EGFR CALCULATED (NON BLACK REFERENCE): 32.3 ML/MIN
GLUCOSE SERPL-MCNC: 109 MG/DL (ref 60–109)
HBA1C MFR BLD: 9.6 % (ref 4.1–5.7)
POTASSIUM SERPL-SCNC: 3.9 MMOL/L (ref 3.4–5.3)
SODIUM SERPL-SCNC: 142 MMOL/L (ref 133–144)

## 2019-06-11 RX ORDER — ACETAMINOPHEN 325 MG/1
325-650 TABLET ORAL EVERY 4 HOURS PRN
Qty: 100 TABLET | Refills: 3 | Status: SHIPPED | OUTPATIENT
Start: 2019-06-11 | End: 2019-10-24

## 2019-06-11 ASSESSMENT — MIFFLIN-ST. JEOR: SCORE: 1158.35

## 2019-06-11 NOTE — NURSING NOTE
Due to patient being non-English speaking/uses sign language, an  was used for this visit. Only for face-to-face interpretation by an external agency, date and length of interpretation can be found on the scanned worksheet.     name:Courtney Wilson  Agency: [employer]  Language: Bobby   Telephone number: [phone]  Type of interpretation: Face-to-face, spoken

## 2019-06-13 ENCOUNTER — TELEPHONE (OUTPATIENT)
Dept: FAMILY MEDICINE | Facility: CLINIC | Age: 64
End: 2019-06-13

## 2019-06-13 NOTE — TELEPHONE ENCOUNTER
[facility] Family Medicine phone call message- medication clarification/question:    Full Medication Name: humalog   Dose:     Question: Patient wants to report that she only has 8 humalog pen left to use. Patient states during last ofv PCP wants to know how much is left in order to know how to order more for the following months as medication has changed brand.     Pharmacy confirmed as [id number] - [place], [place] - [address]: Yes    OK to leave a message on voice mail? Yes    Primary language: ong      needed? Yes    Call taken on June 13, 2019 at 3:03 PM by Gabe Price

## 2019-06-13 NOTE — TELEPHONE ENCOUNTER
One pen will last about 3 days, so current supply will last 24 days.  Will need humalog to switch to novolog the first week of July.

## 2019-07-29 DIAGNOSIS — I10 ESSENTIAL HYPERTENSION: ICD-10-CM

## 2019-07-29 DIAGNOSIS — N18.30 CHRONIC KIDNEY DISEASE, STAGE III (MODERATE) (H): ICD-10-CM

## 2019-07-29 DIAGNOSIS — Z79.4 TYPE 2 DIABETES MELLITUS WITH DIABETIC POLYNEUROPATHY, WITH LONG-TERM CURRENT USE OF INSULIN (H): Primary | ICD-10-CM

## 2019-07-29 DIAGNOSIS — E11.42 TYPE 2 DIABETES MELLITUS WITH DIABETIC POLYNEUROPATHY, WITH LONG-TERM CURRENT USE OF INSULIN (H): Primary | ICD-10-CM

## 2019-07-29 RX ORDER — AMLODIPINE BESYLATE 2.5 MG/1
2.5 TABLET ORAL DAILY
Qty: 90 TABLET | Refills: 3 | Status: SHIPPED | OUTPATIENT
Start: 2019-07-29 | End: 2020-01-28

## 2019-07-29 RX ORDER — LOSARTAN POTASSIUM AND HYDROCHLOROTHIAZIDE 12.5; 1 MG/1; MG/1
1 TABLET ORAL DAILY
Qty: 90 TABLET | Refills: 3 | Status: SHIPPED | OUTPATIENT
Start: 2019-07-29 | End: 2020-06-04

## 2019-07-29 RX ORDER — BLOOD-GLUCOSE METER
EACH MISCELLANEOUS
Qty: 1 KIT | Refills: 0 | Status: SHIPPED | OUTPATIENT
Start: 2019-07-29 | End: 2021-11-11

## 2019-07-29 NOTE — TELEPHONE ENCOUNTER
Message to physician: INSURANCE NO LONGER COVERED FOR ONE TOUCH.PLEASE SWITCH TO ACCU-CHEK METER,STRIPS    Date of last visit: 6/11/2019     Date of next visit if scheduled: Visit date not found      Last Comprehensive Metabolic Panel:  Sodium   Date Value Ref Range Status   06/11/2019 142.0 133.0 - 144.0 mmol/L Final     Potassium   Date Value Ref Range Status   06/11/2019 3.9 3.4 - 5.3 mmol/L Final     Chloride   Date Value Ref Range Status   06/11/2019 105.0 94.0 - 109.0 mmol/L Final     Carbon Dioxide   Date Value Ref Range Status   06/11/2019 25.0 20.0 - 32.0 mmol/L Final     Glucose   Date Value Ref Range Status   06/11/2019 109.0 60.0 - 109.0 mg/dL Final     Urea Nitrogen   Date Value Ref Range Status   06/11/2019 47.0 (H) 7.0 - 30.0 mg/dL Final     Creatinine   Date Value Ref Range Status   06/11/2019 1.7 (H) 0.6 - 1.3 mg/dL Final     GFR Estimate   Date Value Ref Range Status   07/19/2017 40 (L) >60 mL/min/1.73m2 Final     Calcium   Date Value Ref Range Status   06/11/2019 9.2 8.5 - 10.4 mg/dL Final       BP Readings from Last 3 Encounters:   06/11/19 141/66   03/08/19 147/74   12/04/18 132/74       Lab Results   Component Value Date    A1C 9.6 06/11/2019    A1C 9.7 03/08/2019    A1C 9.6 12/04/2018    A1C 8.7 09/07/2018    A1C 7.8 05/09/2018                Please complete refill and CLOSE ENCOUNTER.  Closing the encounter signifies the refill is complete.

## 2019-07-30 NOTE — PROGRESS NOTES
DANETTEOM for pt to C/B, C/B # provided  SUBJECTIVE:  Omid Adorno is a 60 year old who presents today for follow up of DIABETES MELLITUS     1.  Diabetes:  Victoza daily, lantus 70 AM 54 PM, and glipizide 10 in the AM, now also invokana 100 in AM  Patient glucose self monitoring: one time daily, rotating times.   Blood glucose averages: 14 day ave 132  Symptoms of low blood sugar (hypoglycemia:sweating, shaky, weak, dizzy, blurred vision, confusion)? Does feel hungry and some shakiness at those times  Problems taking medications regularly? none  Side effects? Feels like the new invokana affects her appetite    Has been doing well, ill over January 4th with diarrhea, almost went to ED, but treated self with tylenol.    Health maintenance reviewed and appropriate orders placed?  Yes      BP Readings from Last 3 Encounters:   01/18/17 128/72   11/30/16 138/75   09/13/16 122/71     A1C     10.3   11/30/2016  A1C      9.2   9/13/2016  A1C      9.4   6/1/2016  A1C     10.8   3/18/2016  A1C     10.2   12/21/2015      Recent Labs   Lab Test  05/08/15   0847  04/28/14   0829   CHOL  119.0  147.0   HDL  40.0*  47.0*   LDL  62  65.0  82.0   TRIG  74.0  88.0   CHOLHDLRATIO  3.0  3.1       Wt Readings from Last 3 Encounters:   01/18/17 153 lb (69.4 kg)   11/30/16 154 lb 12.8 oz (70.217 kg)   09/13/16 156 lb 3.2 oz (70.852 kg)       Current Outpatient Prescriptions   Medication Sig Dispense Refill     canagliflozin (INVOKANA) 100 MG tablet Take 1 tablet (100 mg) by mouth every morning (before breakfast) 30 tablet 3     insulin glargine (LANTUS) 100 UNIT/ML injection 70 units SQ in AM 54 units SQ in PM 30 mL 11     latanoprost (XALATAN) 0.005 % ophthalmic solution Apply 1 drop to eye daily Instill  1 drop into the affected eye(s) once daily as directed 1 Bottle 3     acetaminophen (TYLENOL) 325 MG tablet Take 1-2 tablets (325-650 mg) by mouth every 4 hours as needed for mild pain 100 tablet 3     ammonium lactate (AMLACTIN) 12 % cream Apply to feet twice daily 420 g 4      "aspirin 81 MG tablet Take 1 tablet (81 mg) by mouth daily 90 tablet 3     atorvastatin (LIPITOR) 40 MG tablet Take 1 tablet (40 mg) by mouth daily 90 tablet 3     blood glucose monitoring (KEYSHA CONTOUR) test strip Check sugars 2 times daily 200 each 3     blood glucose monitoring (KEYSHA MICROLET) lancets Check sugars twice daily 200 Box 3     capsaicin (ZOSTRIX) 0.025 % CREA Apply twice daily to affected skin 180 g 10     cholecalciferol (VITAMIN D) 1000 UNIT tablet Take 1 tablet (1,000 Units) by mouth daily 100 tablet 3     glipiZIDE (GLUCOTROL) 10 MG tablet Take 1 tablet (10 mg) by mouth daily (before lunch) 90 tablet 3     insulin pen needle 31G X 8 MM Use for insulin injections twice daily 200 each 3     liraglutide (VICTOZA) 18 MG/3ML soln Inject 1.8 mg Subcutaneous daily 3 Month 1     lisinopril-hydrochlorothiazide (PRINZIDE,ZESTORETIC) 20-12.5 MG per tablet Take 2 tablets by mouth daily 180 tablet 3     loratadine (CLARITIN) 10 MG tablet Take 1 tablet (10 mg) by mouth daily as needed for allergies 30 tablet 10     Alcohol Swabs PADS Use as directed twice daily 100 each 1     Blood Glucose Monitoring Suppl ([place] CONTOUR MONITOR) W/DEVICE KIT Check fasting glucose 2 day on insulin 1 kit 3       Histories reviewed and updated in Epic.      ROS:  C: NEGATIVE for fatigue, unexpected change in weight  R: NEGATIVE for significant cough or shortness of breath  CV: NEGATIVE for chest pain, palpitations or new or worsening peripheral edema  HEME/ALLERGY/IMMUNE: no known seasonal allergies  P: NEGATIVE for changes in mood or affect    EXAM:  Vitals: /72 mmHg  Pulse 70  Temp(Src) 97.6  F (36.4  C) (Oral)  Resp 16  Ht 4' 7.51\" (141 cm)  Wt 153 lb (69.4 kg)  BMI 34.91 kg/m2  SpO2 98%  BMIE= Body mass index is 34.91 kg/(m^2).  GENERAL APPEARANCE: alert and no acute distress  RESP: lungs clear to auscultation - no rales, rhonchi or wheezes  CV: regular rate and rhythm, normal S1 S2, no S3 or S4 and no " murmur, click or rub -  Foot Exam: normal monofilament, normal skin, mild calluses on lateral heels    ASSESSMENT AND PLAN:  (E11.42) Type 2 diabetes mellitus with diabetic polyneuropathy (HCC)  (primary encounter diagnosis)  Comment: reviewed glucometer readings and improved    If not improving with this plan, may be an ideal candidate for the CGM study  Plan:  Will add on invokanna 100 mg medication to glipizide 10 mg and insulin and victoza.  Discussed change the PM dose of lantus to 50 units, reduce AM back to 64 units

## 2019-08-01 DIAGNOSIS — E11.42 TYPE 2 DIABETES MELLITUS WITH DIABETIC POLYNEUROPATHY, WITH LONG-TERM CURRENT USE OF INSULIN (H): Primary | ICD-10-CM

## 2019-08-01 DIAGNOSIS — Z79.4 TYPE 2 DIABETES MELLITUS WITH DIABETIC POLYNEUROPATHY, WITH LONG-TERM CURRENT USE OF INSULIN (H): Primary | ICD-10-CM

## 2019-08-01 NOTE — TELEPHONE ENCOUNTER
Message to physician: Needs ASAP out of insulin    Date of last visit: 6/11/2019     Date of next visit if scheduled: Visit date 08/08/2019    Last Comprehensive Metabolic Panel:  Sodium   Date Value Ref Range Status   06/11/2019 142.0 133.0 - 144.0 mmol/L Final     Potassium   Date Value Ref Range Status   06/11/2019 3.9 3.4 - 5.3 mmol/L Final     Chloride   Date Value Ref Range Status   06/11/2019 105.0 94.0 - 109.0 mmol/L Final     Carbon Dioxide   Date Value Ref Range Status   06/11/2019 25.0 20.0 - 32.0 mmol/L Final     Glucose   Date Value Ref Range Status   06/11/2019 109.0 60.0 - 109.0 mg/dL Final     Urea Nitrogen   Date Value Ref Range Status   06/11/2019 47.0 (H) 7.0 - 30.0 mg/dL Final     Creatinine   Date Value Ref Range Status   06/11/2019 1.7 (H) 0.6 - 1.3 mg/dL Final     GFR Estimate   Date Value Ref Range Status   07/19/2017 40 (L) >60 mL/min/1.73m2 Final     Calcium   Date Value Ref Range Status   06/11/2019 9.2 8.5 - 10.4 mg/dL Final       BP Readings from Last 3 Encounters:   06/11/19 141/66   03/08/19 147/74   12/04/18 132/74       Lab Results   Component Value Date    A1C 9.6 06/11/2019    A1C 9.7 03/08/2019    A1C 9.6 12/04/2018    A1C 8.7 09/07/2018    A1C 7.8 05/09/2018                Please complete refill and CLOSE ENCOUNTER.  Closing the encounter signifies the refill is complete.

## 2019-08-01 NOTE — TELEPHONE ENCOUNTER
Patient called and stated that her Humalog gone and was told by DR Benson in 06/11/19 last visited that will switch  Form Humalog to Novolog and [facility].

## 2019-08-08 ENCOUNTER — OFFICE VISIT (OUTPATIENT)
Dept: FAMILY MEDICINE | Facility: CLINIC | Age: 64
End: 2019-08-08
Payer: COMMERCIAL

## 2019-08-08 VITALS
BODY MASS INDEX: 34.87 KG/M2 | WEIGHT: 155 LBS | HEART RATE: 66 BPM | DIASTOLIC BLOOD PRESSURE: 73 MMHG | RESPIRATION RATE: 20 BRPM | SYSTOLIC BLOOD PRESSURE: 133 MMHG | HEIGHT: 56 IN | OXYGEN SATURATION: 99 % | TEMPERATURE: 97.5 F

## 2019-08-08 DIAGNOSIS — Z12.11 SPECIAL SCREENING FOR MALIGNANT NEOPLASMS, COLON: ICD-10-CM

## 2019-08-08 DIAGNOSIS — E11.42 TYPE 2 DIABETES MELLITUS WITH DIABETIC POLYNEUROPATHY, WITH LONG-TERM CURRENT USE OF INSULIN (H): ICD-10-CM

## 2019-08-08 DIAGNOSIS — M79.672 LEFT FOOT PAIN: Primary | ICD-10-CM

## 2019-08-08 DIAGNOSIS — Z79.4 TYPE 2 DIABETES MELLITUS WITH DIABETIC POLYNEUROPATHY, WITH LONG-TERM CURRENT USE OF INSULIN (H): ICD-10-CM

## 2019-08-08 RX ORDER — PIOGLITAZONEHYDROCHLORIDE 15 MG/1
15 TABLET ORAL DAILY
Qty: 30 TABLET | Refills: 3 | Status: SHIPPED | OUTPATIENT
Start: 2019-08-08 | End: 2019-10-24

## 2019-08-08 ASSESSMENT — MIFFLIN-ST. JEOR: SCORE: 1103.33

## 2019-08-08 NOTE — PROGRESS NOTES
"SUBJECTIVE:  Patient presents with:  Foot Pain: left foot pain worsening with walking. Stepped on nail about 2-3 year ago. Pain is on and off but worsening.   Medication Reconciliation: completed but needs attention      1. Type 2 DM:  Now on toujeo 76 and at 60 units at bedtime  Humalog now at 30 units and usually 3 or even 4 meals per day  Never feels low or highs and not checking sugars during the day  -checked sugar home from day care had sugar in the 200s (was 3-4 hours after eating)  -now at  there's lots of farmer's market veggies that everyone snacks on including corn  -will give herself the 30 units of insulin in the AM with small rice, at  with large noon meal and then if she snacks again at home in the evening, has given herself another 30 once or twice  --never feels low wants a pill instead of lots of insulin shots, stomach is bruising    2. Foot pains left 2-3 years  -just the pain when walking on the heel, began years ago, nail went through the shoe, was seen in xray  -was evaluated and had tetanus shot    HM  Agrees to fit testing    OBJECTIVE:  /73   Pulse 66   Temp 97.5  F (36.4  C) (Oral)   Resp 20   Ht 1.41 m (4' 7.51\")   Wt 70.3 kg (155 lb)   SpO2 99%   BMI 35.36 kg/m    Gen: alert, oriented X 3, no acute distress, no sign of discomfort  LUNGS: CTAB, no wheezing, no rales, no crackles, no accessory muscle use  COR: normal rate, regular rhythm bradycardic   FOOT:   -heel without visible abnormality and no pain on palpation, no obvious fb     XRAY: normal heel and foot      ASSESSMENT/PLAN:  (M79.112) Left foot pain  (primary encounter diagnosis)  Comment: possibly more of a scar tissue   Plan: XR FOOT LT G/E 3 VW        Trial of topical ice massage, did not like previous physical therapy, provided reassurance    (Z12.11) Special screening for malignant neoplasms, colon  Comment: agrees  Plan: Fecal Occult Blood - FIT, iFOB (P )        If positive would need " colonoscopy    (E11.42,  Z79.4) Type 2 diabetes mellitus with diabetic polyneuropathy, with long-term current use of insulin (H)  Comment: uncontrolled  Plan: pioglitazone (ACTOS) 15 MG tablet, insulin         aspart (NOVOLOG PEN) 100 UNIT/ML pen        Will try low dose TZD as patient no longer wanting significant insulin injections.  Did discuss a change in the mealtime insulin with more insulin at the largest meal 40 units, but less insulin for snacks or smaller meals 20 units.  Recheck in September when due for repeat a1c.

## 2019-08-23 DIAGNOSIS — Z79.4 TYPE 2 DIABETES MELLITUS WITH DIABETIC POLYNEUROPATHY, WITH LONG-TERM CURRENT USE OF INSULIN (H): ICD-10-CM

## 2019-08-23 DIAGNOSIS — E11.42 TYPE 2 DIABETES MELLITUS WITH DIABETIC POLYNEUROPATHY, WITH LONG-TERM CURRENT USE OF INSULIN (H): ICD-10-CM

## 2019-09-30 DIAGNOSIS — E78.00 PURE HYPERCHOLESTEROLEMIA: ICD-10-CM

## 2019-09-30 RX ORDER — ATORVASTATIN CALCIUM 40 MG/1
40 TABLET, FILM COATED ORAL DAILY
Qty: 90 TABLET | Refills: 3 | Status: SHIPPED | OUTPATIENT
Start: 2019-09-30 | End: 2019-10-24

## 2019-10-01 ENCOUNTER — TELEPHONE (OUTPATIENT)
Dept: FAMILY MEDICINE | Facility: CLINIC | Age: 64
End: 2019-10-01

## 2019-10-01 NOTE — TELEPHONE ENCOUNTER
Called pharmacy to discuss. Was advised insurance only allows one month supply, patient last picked up on 09/21. Pharmacy stating patient received all 30 tablets at last fill. Pharmacy staff stated they will notify insurance and ask for authorization to fill again r/t possibly not receiving full amount last refill per patient. Requested call-back from pharmacy with any questions or concerns. Chi FELICIANO

## 2019-10-01 NOTE — TELEPHONE ENCOUNTER
Patient state she has been taking medication over 25 years. This is the first time she has missing pills. Patient state that when she received the medication it looks less then the quantity she should be receiving monthly but never counted. After ten days of taking the medication is when patient realize that the medication is less then should be that's why she did not last the 30 days. Patient wants to ask PCP what she should do then since pharmacy has contacted her to tell her that insurance will not filled more. Patient wants PCP to know she has no more medication starting today until fill date 10/12. Please call and advise.

## 2019-10-01 NOTE — TELEPHONE ENCOUNTER
[facility] [place] [facility] phone call message- medication clarification/question:    Full Medication Name: losartan-hydrochlorothiazide 12.5 MG, vitamin D3, amLODIPine 2.5 MG     Dose:     Question: Patient state pharmacy would not fill these medication until 10/12/19. Patient state that she has been out of medication since yesterday 9/30/2019. Patient state she did not check the pills prior to taking but when she picked up the medication from pharmacy it seem to already be low on pills as it was not filled up to quantity. Patient wants to PCP to know she will be going on 12 days without medication since she is unable to fill and wants to know what PCP thinks patient should do.     Pharmacy confirmed as [id number] - [place], [place] - [address]: Yes    OK to leave a message on voice mail? Yes    Primary language: Hmong      needed? Yes    Call taken on October 1, 2019 at 9:20 AM by Gabe Price

## 2019-10-09 NOTE — NURSING NOTE
Due to patient being non-English speaking/uses sign language, an  was used for this visit. Only for face-to-face interpretation by an external agency, date and length of interpretation can be found on the scanned worksheet.     name: Julius Mccoy  Agency: [employer]  Language: Hmong   Telephone number: [phone]  Type of interpretation: Face-to-face, spoken     Pt. Declined FIT test.      
not appropriate to test

## 2019-10-14 DIAGNOSIS — N18.30 CHRONIC KIDNEY DISEASE, STAGE III (MODERATE) (H): ICD-10-CM

## 2019-10-23 NOTE — PROGRESS NOTES
"SUBJECTIVE:  Patient presents with:  Diabetes: follow up.  Medication Reconciliation: completed.       1. Type 2 DM:  Now on toujeo 80 units at bedtime  Humalog now at 20 or 30 units for snack vs. Larger meal  try low dose TZD as patient no longer wanting significant insulin injections.  Did discuss a change in the mealtime insulin with more insulin at the largest meal 40 units, but less insulin for snacks or smaller meals 20 units.      OBJECTIVE:  /68   Pulse 62   Temp 97.6  F (36.4  C) (Oral)   Resp 20   Ht 1.405 m (4' 7.32\")   Wt 70.3 kg (155 lb)   SpO2 99%   BMI 35.62 kg/m    Gen: alert, oriented X 3, no acute distress, no sign of discomfort  LUNGS: CTAB, no wheezing, no rales, no crackles, no accessory muscle use  COR: normal rate, regular rhythm       ASSESSMENT/PLAN:  (E11.42,  Z79.4) Type 2 diabetes mellitus with diabetic polyneuropathy, with long-term current use of insulin (H)  (primary encounter diagnosis)  Comment: increase pioglitazone to 30 mg daily, discussed how to check post prandial sugars  Plan: Hemoglobin A1c ([facility]), TSH  Sensitive         ([facility]), Microalbumin Random Ur         ([facility]), insulin aspart (NOVOLOG PEN) 100         UNIT/ML pen, pioglitazone (ACTOS) 30 MG tablet        Recheck 1 month    (M05.741,  M05.742) Rheumatoid arthritis involving both hands with positive rheumatoid factor (H)  Comment: refill  Plan: acetaminophen (TYLENOL) 325 MG tablet            (E78.00) Pure hypercholesterolemia  Comment: refill  Plan: atorvastatin (LIPITOR) 40 MG tablet            (N18.3) Chronic kidney disease, stage III (moderate) (H)  Comment: refill  Plan: vitamin D3 (CHOLECALCIFEROL) 1000 units (25         mcg) tablet              "

## 2019-10-24 ENCOUNTER — OFFICE VISIT (OUTPATIENT)
Dept: FAMILY MEDICINE | Facility: CLINIC | Age: 64
End: 2019-10-24
Payer: COMMERCIAL

## 2019-10-24 VITALS
HEIGHT: 55 IN | WEIGHT: 155 LBS | DIASTOLIC BLOOD PRESSURE: 68 MMHG | BODY MASS INDEX: 35.87 KG/M2 | SYSTOLIC BLOOD PRESSURE: 125 MMHG | TEMPERATURE: 97.6 F | OXYGEN SATURATION: 99 % | RESPIRATION RATE: 20 BRPM | HEART RATE: 62 BPM

## 2019-10-24 DIAGNOSIS — E78.00 PURE HYPERCHOLESTEROLEMIA: ICD-10-CM

## 2019-10-24 DIAGNOSIS — Z79.4 TYPE 2 DIABETES MELLITUS WITH DIABETIC POLYNEUROPATHY, WITH LONG-TERM CURRENT USE OF INSULIN (H): Primary | ICD-10-CM

## 2019-10-24 DIAGNOSIS — N18.30 CHRONIC KIDNEY DISEASE, STAGE III (MODERATE) (H): ICD-10-CM

## 2019-10-24 DIAGNOSIS — E11.42 TYPE 2 DIABETES MELLITUS WITH DIABETIC POLYNEUROPATHY, WITH LONG-TERM CURRENT USE OF INSULIN (H): Primary | ICD-10-CM

## 2019-10-24 DIAGNOSIS — Z23 NEED FOR PROPHYLACTIC VACCINATION AND INOCULATION AGAINST INFLUENZA: ICD-10-CM

## 2019-10-24 DIAGNOSIS — M05.742 RHEUMATOID ARTHRITIS INVOLVING BOTH HANDS WITH POSITIVE RHEUMATOID FACTOR (H): ICD-10-CM

## 2019-10-24 DIAGNOSIS — M05.741 RHEUMATOID ARTHRITIS INVOLVING BOTH HANDS WITH POSITIVE RHEUMATOID FACTOR (H): ICD-10-CM

## 2019-10-24 LAB
CREAT UR-MCNC: 61.7 MG/DL
HBA1C MFR BLD: 10.3 % (ref 4.1–5.7)
MICROALBUMIN UR-MCNC: 7.4 MG/DL (ref 0–1.99)
MICROALBUMIN/CREAT UR: 119.9 MG/G
TSH SERPL DL<=0.05 MIU/L-ACNC: 0.23 UIU/ML (ref 0.3–5)

## 2019-10-24 RX ORDER — ACETAMINOPHEN 325 MG/1
325-650 TABLET ORAL EVERY 4 HOURS PRN
Qty: 100 TABLET | Refills: 3 | Status: SHIPPED | OUTPATIENT
Start: 2019-10-24 | End: 2019-12-19

## 2019-10-24 RX ORDER — ATORVASTATIN CALCIUM 40 MG/1
40 TABLET, FILM COATED ORAL DAILY
Qty: 90 TABLET | Refills: 3 | Status: SHIPPED | OUTPATIENT
Start: 2019-10-24 | End: 2020-06-04

## 2019-10-24 RX ORDER — ASPIRIN 81 MG/1
81 TABLET, COATED ORAL DAILY
Refills: 3 | COMMUNITY
Start: 2019-10-16 | End: 2020-06-02

## 2019-10-24 RX ORDER — PIOGLITAZONEHYDROCHLORIDE 30 MG/1
30 TABLET ORAL DAILY
Qty: 30 TABLET | Refills: 11 | Status: SHIPPED | OUTPATIENT
Start: 2019-10-24 | End: 2020-06-04

## 2019-10-24 ASSESSMENT — MIFFLIN-ST. JEOR: SCORE: 1100.21

## 2019-10-24 NOTE — NURSING NOTE
"Chief Complaint   Patient presents with     Diabetes     follow up.     Medication Reconciliation     completed.        /68   Pulse 62   Temp 97.6  F (36.4  C) (Oral)   Resp 20   Ht 1.405 m (4' 7.32\")   Wt 70.3 kg (155 lb)   SpO2 99%   BMI 35.62 kg/m       Due to patient being non-English speaking/uses sign language, an  was used for this visit. Only for face-to-face interpretation by an external agency, date and length of interpretation can be found on the scanned worksheet.       name: Courtney Wilson  Language: Bobby  Agency: [employer]  Phone number: [phone]  Type of interpretation: Face to Face spoken      "

## 2019-10-25 LAB — T4 FREE SERPL-MCNC: 0.9 NG/DL (ref 0.7–1.8)

## 2019-10-28 NOTE — RESULT ENCOUNTER NOTE
Pt returned call and inform her of results. Pt states understanding and have no further concern for MD at this time. --Coy, CMA

## 2019-11-22 DIAGNOSIS — E11.42 TYPE 2 DIABETES MELLITUS WITH DIABETIC POLYNEUROPATHY, WITH LONG-TERM CURRENT USE OF INSULIN (H): ICD-10-CM

## 2019-11-22 DIAGNOSIS — Z79.4 TYPE 2 DIABETES MELLITUS WITH DIABETIC POLYNEUROPATHY, WITH LONG-TERM CURRENT USE OF INSULIN (H): ICD-10-CM

## 2019-11-22 DIAGNOSIS — H42 GLAUCOMA OF BOTH EYES ASSOCIATED WITH UNDERLYING DISEASE: ICD-10-CM

## 2019-11-23 RX ORDER — LIRAGLUTIDE 6 MG/ML
1.8 INJECTION SUBCUTANEOUS DAILY
Qty: 27 ML | Refills: 3 | Status: SHIPPED | OUTPATIENT
Start: 2019-11-23 | End: 2020-06-04

## 2019-11-23 RX ORDER — LATANOPROST 50 UG/ML
1 SOLUTION/ DROPS OPHTHALMIC DAILY
Qty: 2.5 ML | Refills: 3 | Status: SHIPPED | OUTPATIENT
Start: 2019-11-23 | End: 2020-04-01

## 2019-11-23 NOTE — PROGRESS NOTES
"SUBJECTIVE:  Patient presents with:  RECHECK: f/u DM  Medication Reconciliation: Completed      1. Type 2 DM:  Now on toujeo 80 units at bedtime  Increased TZD to 30 mg at last visit and see how postprandial sugars were  Change in the mealtime insulin with more insulin at the largest meal 40 units, but less insulin for snacks or smaller meals 20 units.  On times that she used 40: sugar dropped down to 60-70s  Now on 36 units for big meals (meal at )  For her supper at home 20-22 units, also 20 for her am  AM sugars 80-200s, more often    2. HTN:  Takes BP med and compliant, nothings changed    BP Readings from Last 3 Encounters:   11/25/19 (!) 167/80   10/24/19 125/68   08/08/19 133/73     ROS:  GEN: no weight gain/loss  LUNGS: no dyspnea, wheeze, cough  COR: no chest pain, palpitations, PND  GI: denies pain, change in BM    OBJECTIVE:  BP (!) 167/80   Pulse 63   Temp 97.8  F (36.6  C) (Oral)   Resp 16   Ht 1.435 m (4' 8.5\")   Wt 74.1 kg (163 lb 6.4 oz)   SpO2 98%   BMI 35.99 kg/m    Gen: alert, oriented X 3, no acute distress, no sign of discomfort  LUNGS: CTAB, no wheezing, no rales, no crackles, no accessory muscle use  COR: normal rate, regular rhythm, 2/6 systolic murmur at sternal border      ASSESSMENT/PLAN:  (E11.42,  Z79.4) Type 2 diabetes mellitus with diabetic polyneuropathy, with long-term current use of insulin (H)  (primary encounter diagnosis)  Comment: possibly improved, reviewed glucometer  Plan: Insulin Glargine, 2 Unit Dial, (TOUJEO MAX         SOLOSTAR) 300 UNIT/ML SOPN        Congratulated on how she is self titrating,  Increase toujeo to 84 units may go up to 86 if AM fasting not 100-150 range  Same actos, same mealtime, has enough supplies, continue victoza    (I10) Essential hypertension  Comment: elevated today  Plan: recheck in 2 weeks, no changes to meds    (R01.1) Murmur, cardiac  Comment: evaluate  Plan: Echocardiogram Complete        Possible aortic stenosis vs. " sclerosis

## 2019-11-25 ENCOUNTER — OFFICE VISIT (OUTPATIENT)
Dept: FAMILY MEDICINE | Facility: CLINIC | Age: 64
End: 2019-11-25
Payer: COMMERCIAL

## 2019-11-25 VITALS
HEART RATE: 63 BPM | DIASTOLIC BLOOD PRESSURE: 77 MMHG | RESPIRATION RATE: 16 BRPM | OXYGEN SATURATION: 98 % | BODY MASS INDEX: 35.25 KG/M2 | TEMPERATURE: 97.8 F | WEIGHT: 163.4 LBS | SYSTOLIC BLOOD PRESSURE: 167 MMHG | HEIGHT: 57 IN

## 2019-11-25 DIAGNOSIS — I10 ESSENTIAL HYPERTENSION: ICD-10-CM

## 2019-11-25 DIAGNOSIS — Z79.4 TYPE 2 DIABETES MELLITUS WITH DIABETIC POLYNEUROPATHY, WITH LONG-TERM CURRENT USE OF INSULIN (H): Primary | ICD-10-CM

## 2019-11-25 DIAGNOSIS — E11.42 TYPE 2 DIABETES MELLITUS WITH DIABETIC POLYNEUROPATHY, WITH LONG-TERM CURRENT USE OF INSULIN (H): Primary | ICD-10-CM

## 2019-11-25 DIAGNOSIS — R01.1 MURMUR, CARDIAC: ICD-10-CM

## 2019-11-25 ASSESSMENT — MIFFLIN-ST. JEOR: SCORE: 1157.12

## 2019-11-25 NOTE — NURSING NOTE
Due to patient being non-English speaking/uses sign language, an  was used for this visit. Only for face-to-face interpretation by an external agency, date and length of interpretation can be found on the scanned worksheet.     name: Naty Santos  Agency: [employer]  Language: Bobby   Telephone number: [phone]  Type of interpretation: Face-to-face, spoken   SANDY Payne

## 2019-11-25 NOTE — PATIENT INSTRUCTIONS
Referral for :     Echocardiogram complete   LOCATION/PLACE/Provider :    [facility]   DATE & TIME :    Dec. 27th at 10 am (Patient called clinic wondering why she hadn't heard anything yet. Called and scheduled her appt for her)  PHONE :     [phone]  FAX :    [phone]  Appointment made by clinic staff/:    Rowena

## 2019-11-26 ENCOUNTER — RECORDS - HEALTHEAST (OUTPATIENT)
Dept: ADMINISTRATIVE | Facility: OTHER | Age: 64
End: 2019-11-26

## 2019-12-04 ENCOUNTER — TRANSFERRED RECORDS (OUTPATIENT)
Dept: HEALTH INFORMATION MANAGEMENT | Facility: CLINIC | Age: 64
End: 2019-12-04

## 2019-12-06 ENCOUNTER — TELEPHONE (OUTPATIENT)
Dept: FAMILY MEDICINE | Facility: CLINIC | Age: 64
End: 2019-12-06

## 2019-12-06 DIAGNOSIS — Z20.1 EXPOSURE TO TB: ICD-10-CM

## 2019-12-06 NOTE — TELEPHONE ENCOUNTER
[facility] phone call message- general phone call:    Reason for call: FYI: The patient was re expose 12/5/18, recommend 2 years of active monitoring that consist of every 6 month being evaluated by a doctor with a  Symptoms screen and chest xray. The Patient came to their clinic [facility] 6/5/19 and 12/4/19 however at the 12/4/19 doctor visit she discuss to see her PCP instead of going to their clinic. So the next symptom screen would be 6/2020 and 12/2020. At these screening she would need a symptoms screen and Chest xray. Patient knows to call them TB clinic if she has any TB symptoms. But would also like Dr. Benson to reach out to them as well if there is any concerns. She states she faxed over the 12/4/19 progress notes and chest xray report. She states she wants a call back to see if Dr. Benson is okay following her for multi drug resistant, TB active monitoring. Please call and advise.     Return call needed: Yes    OK to leave a message on voice mail?     Primary language: Hmong      needed? Yes    Call taken on December 6, 2019 at 12:44 PM by Sherwin Huff

## 2019-12-18 NOTE — PROGRESS NOTES
SUBJECTIVE:  Patient presents with:  Diabetes: DM check      1. Type 2 DM:  Now on Increase toujeo to 84 units may go up to 86 if AM fasting not 100-150 range  Same actos, same mealtime, has enough supplies, continue brandi  Wonders about restarting januvia  Ave sugars in last week 200s, in 30 days ave of 177d      2. HTN:  Takes BP med and compliant, nothings changed, didn't take AM bp pill yet    3. Heart murmur: echo to be done 12/27 so no information yet.     4. Left shoulder 4-5 years but more pain:  Hurts with overhead, wonders if an image would be helpful    BP Readings from Last 3 Encounters:   12/19/19 (!) 148/63   11/25/19 (!) 167/77   10/24/19 125/68     ROS:  GEN: no weight gain/loss  LUNGS: no dyspnea, wheeze, cough  COR: no chest pain, palpitations, PND  GI: denies pain, change in BM    OBJECTIVE:  BP (!) 148/63   Pulse 56   Resp 18   SpO2 99%   Gen: alert, oriented X 3, no acute distress, no sign of discomfort  LUNGS: CTAB, no wheezing, no rales, no crackles, no accessory muscle use  COR: normal rate, regular rhythm, 2/6 systolic murmur at sternal border  Neck: full ROM. Negative Spurlings.  Shoulder:bilateral skin without erythema, swelling or ecchymosis. No atrophy.  Non tender to palpation over the SC joint, clavicle, AC joint, or bicipital groove..   ROM left: Flexion to 90 deg. Abduction to 90 deg, IR and ER reduced  No pain with cross body adduction. No winging of the scapula or scapular dyskinesis.  STRENGTH: bilateral 5/5 in abduction, ER, IR, subscapularis lift off test unable on left. 5/5 strength with  supraspinatus testing pain with all movements.   Special tests: positive [place]. Mildly painful left poured can testing.      ASSESSMENT/PLAN:  (I10) Essential hypertension  (primary encounter diagnosis)  Comment: uncontrolled but didn't take AM pills  Plan: Basic Metabolic Panel ([facility])  - Results < 1         hr        ADJUST TO TAKE LOSARTAN AT EVENING, AMLODIPINE IN am AND RECHECK IN  JANUARY    (E11.42,  Z79.4) Type 2 diabetes mellitus with diabetic polyneuropathy, with long-term current use of insulin (H)  Comment: CONSIDER CHANGE IN MEDS POSSIBLY  Plan: Basic Metabolic Panel ([facility])  - Results < 1         hr        RECHECK IN January, given GFR unable to add SGLT2 agent, will continue to work on insulin    (Z86.11) Hx of primary tuberculosis  Comment: CONFIRM WITH TB CLINIC  Plan: ROUTINE XRAYS, CHECK ON NEED FOR SPUTUM    (M75.02) Adhesive capsulitis of left shoulder  Comment: DISCUSSED PT, given above question on TB, will arrange for XRAY of shoulder in January to evaluate for arthritic change  Plan: HOME EXERCISE PLAN: rom CIRCLES, DEPENDING ON BMP CONSIDER ADVIL OR NAPROXEN  CKD stage III but GFR low at 30-40.  Only tylenol, avoid steroid given glucose control.

## 2019-12-19 ENCOUNTER — OFFICE VISIT (OUTPATIENT)
Dept: FAMILY MEDICINE | Facility: CLINIC | Age: 64
End: 2019-12-19
Payer: COMMERCIAL

## 2019-12-19 VITALS
RESPIRATION RATE: 18 BRPM | HEART RATE: 56 BPM | SYSTOLIC BLOOD PRESSURE: 148 MMHG | OXYGEN SATURATION: 99 % | DIASTOLIC BLOOD PRESSURE: 63 MMHG

## 2019-12-19 DIAGNOSIS — M05.742 RHEUMATOID ARTHRITIS INVOLVING BOTH HANDS WITH POSITIVE RHEUMATOID FACTOR (H): ICD-10-CM

## 2019-12-19 DIAGNOSIS — E11.42 TYPE 2 DIABETES MELLITUS WITH DIABETIC POLYNEUROPATHY, WITH LONG-TERM CURRENT USE OF INSULIN (H): ICD-10-CM

## 2019-12-19 DIAGNOSIS — M75.02 ADHESIVE CAPSULITIS OF LEFT SHOULDER: ICD-10-CM

## 2019-12-19 DIAGNOSIS — I10 ESSENTIAL HYPERTENSION: Primary | ICD-10-CM

## 2019-12-19 DIAGNOSIS — Z79.4 TYPE 2 DIABETES MELLITUS WITH DIABETIC POLYNEUROPATHY, WITH LONG-TERM CURRENT USE OF INSULIN (H): ICD-10-CM

## 2019-12-19 DIAGNOSIS — Z86.11: ICD-10-CM

## 2019-12-19 DIAGNOSIS — M05.741 RHEUMATOID ARTHRITIS INVOLVING BOTH HANDS WITH POSITIVE RHEUMATOID FACTOR (H): ICD-10-CM

## 2019-12-19 LAB
BUN SERPL-MCNC: 41 MG/DL (ref 7–30)
CALCIUM SERPL-MCNC: 9.7 MG/DL (ref 8.5–10.4)
CHLORIDE SERPLBLD-SCNC: 108 MMOL/L (ref 94–109)
CO2 SERPL-SCNC: 27 MMOL/L (ref 20–32)
CREAT SERPL-MCNC: 1.6 MG/DL (ref 0.6–1.3)
EGFR CALCULATED (BLACK REFERENCE): 41.7 ML/MIN
EGFR CALCULATED (NON BLACK REFERENCE): 34.5 ML/MIN
GLUCOSE SERPL-MCNC: 124 MG/DL (ref 60–109)
POTASSIUM SERPL-SCNC: 4.8 MMOL/L (ref 3.4–5.3)
SODIUM SERPL-SCNC: 143 MMOL/L (ref 133–144)

## 2019-12-19 RX ORDER — ACETAMINOPHEN 325 MG/1
650 TABLET ORAL 4 TIMES DAILY
Qty: 100 TABLET | Refills: 3 | Status: SHIPPED | OUTPATIENT
Start: 2019-12-19 | End: 2020-10-05

## 2019-12-19 NOTE — NURSING NOTE
Due to patient being non-English speaking/uses sign language, an  was used for this visit. Only for face-to-face interpretation by an external agency, date and length of interpretation can be found on the scanned worksheet.     name: Sofia Price   Agency: [employer]  Language: Rafong   Telephone number: [phone]  Type of interpretation: Face-to-face, spoken

## 2019-12-19 NOTE — ASSESSMENT & PLAN NOTE
1993 Treated at [place]: monitored by [facility] TB clinic for 6 months -repeated cxr and sputums     exposure to TB 2017.  Declined repeat TB treatment and on CXR monitoring.    Will confirm abnormal cxr and confirmed not active with sputums perhaps last year.    New  participant also with new TB so the surveillance was extended.  Too difficult to get to the TB clinic.

## 2019-12-20 NOTE — TELEPHONE ENCOUNTER
Spoke with Shakira. Shakira stated CXR came back abnormal but stable. Treast as LTBI. Patient declined medication treatment and opted for active monitoring. Has had two appointments on 06/05/19 and 12/04/19. Will need two more on 06/20/19 and 12/20/19. Physician to evaluate for TB symptoms and complete CXR. Shakira stated no precautions needed at this time, no sputum needing to be collected. Chi FELICIANO

## 2019-12-20 NOTE — RESULT ENCOUNTER NOTE
Call patient:    Kidney function is stable.  You do have reduced kidney function, so we need to be careful with all your medications.  We will need to always keep you on insulin to protect your kidneys.  For your joint pains only using tylenol by mouth is safest.  I would take 2 tablets 3 times a day to manage your shoulder pain.
Called patient with results.     ~ Ezequiel HOOD (Chrissi) CMA [facility]  [phone]      
facial expression/grimacing

## 2019-12-27 ENCOUNTER — TRANSFERRED RECORDS (OUTPATIENT)
Dept: HEALTH INFORMATION MANAGEMENT | Facility: CLINIC | Age: 64
End: 2019-12-27

## 2019-12-27 ENCOUNTER — HOSPITAL ENCOUNTER (OUTPATIENT)
Dept: CARDIOLOGY | Facility: HOSPITAL | Age: 64
Discharge: HOME | End: 2019-12-27
Attending: FAMILY MEDICINE

## 2019-12-27 DIAGNOSIS — R01.1 MURMUR: ICD-10-CM

## 2019-12-27 LAB
AORTIC ROOT: 2.5 CM
AORTIC VALVE MEAN VELOCITY: 125 CM/S
ASCENDING AORTA: 3.1 CM
AV DIMENSIONLESS INDEX VTI: 0.6
AV MEAN GRADIENT: 7 MMHG
AV PEAK GRADIENT: 15.8 MMHG
AV VALVE AREA: 1.6 CM2
BSA FOR ECHO PROCEDURE: 1.65 M2
CV BLOOD PRESSURE: ABNORMAL MMHG
CV ECHO HEIGHT: 55 IN
CV ECHO WEIGHT: 155 LBS
DOP CALC AO PEAK VEL: 199 CM/S
DOP CALC AO VTI: 43.3 CM
DOP CALC LVOT AREA: 2.54 CM2
DOP CALC LVOT DIAMETER: 1.8 CM
DOP CALC LVOT STROKE VOLUME: 69.4 CM3
DOP CALC MV VTI: 30.5 CM
DOP CALCLVOT PEAK VEL VTI: 27.3 CM
EJECTION FRACTION: 69 % (ref 55–75)
FRACTIONAL SHORTENING: 26.9 % (ref 28–44)
INTERVENTRICULAR SEPTUM IN END DIASTOLE: 0.9 CM (ref 0.6–0.9)
IVS/PW RATIO: 0.9
LA AREA 1: 17.6 CM2
LA AREA 2: 14.8 CM2
LEFT ATRIUM LENGTH: 5.61 CM
LEFT ATRIUM SIZE: 4.1 CM
LEFT ATRIUM TO AORTIC ROOT RATIO: 1.64 NO UNITS
LEFT ATRIUM VOLUME INDEX: 23.9 ML/M2
LEFT ATRIUM VOLUME: 39.5 ML
LEFT VENTRICLE CARDIAC INDEX: 2.6 L/MIN/M2
LEFT VENTRICLE CARDIAC OUTPUT: 4.3 L/MIN
LEFT VENTRICLE DIASTOLIC VOLUME INDEX: 48.5 CM3/M2 (ref 29–61)
LEFT VENTRICLE DIASTOLIC VOLUME: 80 CM3 (ref 46–106)
LEFT VENTRICLE HEART RATE: 62 BPM
LEFT VENTRICLE MASS INDEX: 109.9 G/M2
LEFT VENTRICLE SYSTOLIC VOLUME INDEX: 15.2 CM3/M2 (ref 8–24)
LEFT VENTRICLE SYSTOLIC VOLUME: 25 CM3 (ref 14–42)
LEFT VENTRICULAR INTERNAL DIMENSION IN DIASTOLE: 5.2 CM (ref 3.8–5.2)
LEFT VENTRICULAR INTERNAL DIMENSION IN SYSTOLE: 3.8 CM (ref 2.2–3.5)
LEFT VENTRICULAR MASS: 181.4 G
LEFT VENTRICULAR OUTFLOW TRACT MEAN GRADIENT: 3 MMHG
LEFT VENTRICULAR OUTFLOW TRACT MEAN VELOCITY: 74.5 CM/S
LEFT VENTRICULAR POSTERIOR WALL IN END DIASTOLE: 1 CM (ref 0.6–0.9)
LV STROKE VOLUME INDEX: 42.1 ML/M2
MITRAL VALVE E/A RATIO: 0.8
MITRAL VALVE MEAN INFLOW VELOCITY: 67.2 CM/S
MITRAL VALVE PEAK VELOCITY: 132 CM/S
MV AREA VTI: 2.28 CM2
MV AVERAGE E/E' RATIO: 15.5 CM/S
MV DECELERATION TIME: 250 MS
MV E'TISSUE VEL-LAT: 7.41 CM/S
MV E'TISSUE VEL-MED: 5.29 CM/S
MV LATERAL E/E' RATIO: 13.3
MV MEAN GRADIENT: 2 MMHG
MV MEDIAL E/E' RATIO: 18.6
MV PEAK A VELOCITY: 129 CM/S
MV PEAK E VELOCITY: 98.2 CM/S
MV PEAK GRADIENT: 7 MMHG
MV VALVE AREA BY CONTINUITY EQUATION: 2.3 CM2
NUC REST DIASTOLIC VOLUME INDEX: 2480 LBS
NUC REST SYSTOLIC VOLUME INDEX: 55 IN
PR MAX PG: 3 MMHG
PR PEAK VELOCITY: 80.1 CM/S
PV PEAK GRADIENT: 6.2 MMHG
PV VMAX: 124 CM/S
TRICUSPID VALVE ANULAR PLANE SYSTOLIC EXCURSION: 1.9 CM

## 2019-12-27 ASSESSMENT — MIFFLIN-ST. JEOR: SCORE: 1090.21

## 2020-01-06 ENCOUNTER — OFFICE VISIT (OUTPATIENT)
Dept: FAMILY MEDICINE | Facility: CLINIC | Age: 65
End: 2020-01-06
Payer: COMMERCIAL

## 2020-01-06 VITALS
DIASTOLIC BLOOD PRESSURE: 68 MMHG | HEIGHT: 57 IN | BODY MASS INDEX: 36.24 KG/M2 | HEART RATE: 66 BPM | OXYGEN SATURATION: 97 % | RESPIRATION RATE: 18 BRPM | SYSTOLIC BLOOD PRESSURE: 151 MMHG | TEMPERATURE: 97.9 F | WEIGHT: 168 LBS

## 2020-01-06 DIAGNOSIS — Z22.7 LATENT TUBERCULOSIS: ICD-10-CM

## 2020-01-06 DIAGNOSIS — E11.42 TYPE 2 DIABETES MELLITUS WITH DIABETIC POLYNEUROPATHY, WITH LONG-TERM CURRENT USE OF INSULIN (H): Primary | ICD-10-CM

## 2020-01-06 DIAGNOSIS — K21.9 GASTROESOPHAGEAL REFLUX DISEASE WITHOUT ESOPHAGITIS: ICD-10-CM

## 2020-01-06 DIAGNOSIS — Z79.4 TYPE 2 DIABETES MELLITUS WITH DIABETIC POLYNEUROPATHY, WITH LONG-TERM CURRENT USE OF INSULIN (H): Primary | ICD-10-CM

## 2020-01-06 DIAGNOSIS — I10 ESSENTIAL HYPERTENSION: ICD-10-CM

## 2020-01-06 PROBLEM — Z86.11: Status: ACTIVE | Noted: 2019-12-19

## 2020-01-06 PROBLEM — Z20.1 EXPOSURE TO TB: Status: ACTIVE | Noted: 2019-12-10

## 2020-01-06 ASSESSMENT — MIFFLIN-ST. JEOR: SCORE: 1177.98

## 2020-01-06 NOTE — PROGRESS NOTES
Assessment and Plan   1. Type 2 diabetes mellitus with diabetic polyneuropathy, with long-term current use of insulin (H)  Last A1c 10.3 10/2019, on 20 units of aspart for snacks and 30 for meals. 84 units of glargine at bedtime along with Victoza as well as Actos. Filled out DMV form, should be completed again in 1 year.    2. Latent tuberculosis  Reviewed last TB clinic note from early 12/2019 - states the above diagnosis given exposure in 2017 to MDR at adult  and that she declined treatment but has been doing active surveillance. CXR earlier this month with no changes and no symptoms. However, note indicated that there may have been another exposure at adult . We are going to follow her here now, needs another CXR in 6 months. In updating her problem list I did see that she may have had active TB in 1993?    3. Essential hypertension  BP above goal today although is close - in reviewing her past BP's she has been borderline the last several visits. Will not change medications today given recent switch to AM and PM dosing. Will f/u as below.    Follow up as scheduled 1/28.    Options for treatment and follow-up care were reviewed with the patient and/or guardian. Omid Adorno and/or guardian engaged in the decision making process and verbalized understanding of the options discussed and agreed with the final plan.    Franklin Mulligan MD  [facility], PGY-3    Precepted patient with Dr. Stefanie Robin       HPI:   Omid Adorno is a 64 year old female who presents to clinic today for   Chief Complaint   Patient presents with     Forms     dmv, confirm medication for DM     Medication Reconciliation     Patient needs a recert for driving. No episodes of hypoglycemia. Taking her insulin as prescribed.    She has recently changed her BP medication to the combo in the AM, amlodipine at night.    She denies fevers, night sweats, new  cough.    A [employer]  was used for this visit         Review of Systems:     Constitutional, HEENT, cardiovascular, pulmonary, GI, musculoskeletal, neuro, skin, and psych systems are negative, except as otherwise noted.          PMHX:   Active Problems List  Patient Active Problem List   Diagnosis     Health Care Home     Hepatitis B carrier (H)     Chronic kidney disease, stage III (moderate) (H)     Pure hypercholesterolemia     Essential hypertension     Obesity     Vitamin D deficiency     Major depressive disorder, recurrent episode, moderate (H)     Dyspepsia     Glaucoma of both eyes associated with underlying disease     Other lymphedema     Type 2 diabetes mellitus with diabetic polyneuropathy, with long-term current use of insulin (H)     Chronic hepatitis B virus infection (H)     Inflammatory polyarthropathy (H)     Latent tuberculosis, 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     Active problem list reviewed and updated.    Current Medications  Current Outpatient Medications   Medication Sig Dispense Refill     acetaminophen (TYLENOL) 325 MG tablet Take 2 tablets (650 mg) by mouth 4 times daily 100 tablet 3     amLODIPine (NORVASC) 2.5 MG tablet Take 1 tablet (2.5 mg) by mouth daily 90 tablet 3     ASPIRIN LOW DOSE 81 MG EC tablet Take 81 mg by mouth daily  3     atorvastatin (LIPITOR) 40 MG tablet Take 1 tablet (40 mg) by mouth daily 90 tablet 3     blood glucose (ACCU-CHEK CHIKA) test strip Use to test blood sugar 3 times daily or as directed. 100 strip 11     blood glucose monitoring (ACCU-CHEK CHIKA PLUS) meter device kit Use to test blood sugar 3 times daily or as directed. 1 kit 0     blood glucose monitoring (ACCU-CHEK MULTICLIX) lancets Use to test blood sugar 3 times daily or as directed. 100 each 11     blood glucose monitoring (ONE TOUCH DELICA) lancets Use to test blood sugars 2 times daily or as directed. 100 each 3     blood glucose monitoring (ONE TOUCH ULTRA MINI) meter device kit Use to  test blood sugars 2 times daily or as directed. 1 kit 3     blood glucose monitoring (ONETOUCH ULTRA) test strip Use to test blood sugars 2 times daily or as directed. 200 strip 3     insulin aspart (NOVOLOG PEN) 100 UNIT/ML pen 20 units subcutaneous for  snack and 30 units subcutaneous for meals 45 mL 11     Insulin Glargine, 2 Unit Dial, (TOUJEO MAX SOLOSTAR) 300 UNIT/ML SOPN Inject 84 Units Subcutaneous At Bedtime 12 mL 3     insulin pen needle (31G X 8 MM) 31G X 8 MM miscellaneous Use for insulin injections four times daily (with meals and bedtime) 360 each 3     latanoprost (XALATAN) 0.005 % ophthalmic solution Apply 1 drop to eye daily Instill  1 drop into the affected eye(s) once daily as directed 2.5 mL 3     liraglutide (VICTOZA) 18 MG/3ML solution Inject 1.8 mg Subcutaneous daily 27 mL 3     losartan-hydrochlorothiazide (HYZAAR) 100-12.5 MG tablet Take 1 tablet by mouth daily 90 tablet 3     order for DME Equipment being ordered: RESPIRATORY MASK  -wear to  daily to prevent infection spread 1 each 0     order for DME Equipment being ordered:     Massage tube, freeze and use to massage heel twice daily 1 Device 0     pioglitazone (ACTOS) 30 MG tablet Take 1 tablet (30 mg) by mouth daily 30 tablet 11     ranitidine (ZANTAC) 150 MG tablet Take 1 tablet (150 mg) by mouth 2 times daily as needed for heartburn 180 tablet 3     tenofovir alafenamide fumarate (VEMLIDY) 25 MG tablet Take 1 tablet (25 mg) by mouth daily with food (dispense only in the original container). 30 tablet      vitamin D3 (CHOLECALCIFEROL) 1000 units (25 mcg) tablet Take 1 tablet (1,000 Units) by mouth daily 100 tablet 3     Medication list reviewed and updated.    Social History  Social History     Tobacco Use     Smoking status: Never Smoker     Smokeless tobacco: Never Used     Tobacco comment: no exposure at home per pt   Substance Use Topics     Alcohol use: No     Alcohol/week: 0.0 standard drinks     Drug use: No     History  "  Drug Use No     Family History  Family History   Problem Relation Age of Onset     Cancer No family hx of      Diabetes No family hx of      Cardiovascular No family hx of      Coronary Artery Disease No family hx of      Cerebrovascular Disease No family hx of      Breast Cancer No family hx of      Colon Cancer No family hx of      Other Cancer No family hx of      Prostate Cancer No family hx of      Hypertension No family hx of      Asthma No family hx of      Allergies  Allergies   Allergen Reactions     Lisinopril Cough          Physical Exam:     Vitals:    01/06/20 1411 01/06/20 1450   BP: (!) 157/69 (!) 151/68   Pulse: 66    Resp: 18    Temp: 97.9  F (36.6  C)    TempSrc: Oral    SpO2: 97%    Weight: 76.2 kg (168 lb)    Height: 1.435 m (4' 8.5\")      Body mass index is 37 kg/m .    GENERAL APPEARANCE: alert, appears stated age, no acute distress  HEENT: Eyes grossly normal to inspection  RESP: lungs clear to auscultation - no rales, rhonchi, or wheezes  CV: regular rate and rhythm, no murmur, click, rub, or gallop  ABDOMEN: soft, obese, nontender   MSK: extremities normal, no gross deformities noted, no lower extremity edema  NEURO: sensory exam grossly normal, mentation appears intact and speech normal  PSYCH: mood and affect normal      "

## 2020-01-06 NOTE — PROGRESS NOTES
Preceptor Attestation:  Patient's case reviewed and discussed with  Patient seen and discussed with the resident..  I agree with written assessment and plan of care.  Supervising Physician:  Stefanie Robin MD  [facility]

## 2020-01-28 ENCOUNTER — OFFICE VISIT (OUTPATIENT)
Dept: FAMILY MEDICINE | Facility: CLINIC | Age: 65
End: 2020-01-28
Payer: COMMERCIAL

## 2020-01-28 VITALS
TEMPERATURE: 97.7 F | HEART RATE: 60 BPM | RESPIRATION RATE: 18 BRPM | OXYGEN SATURATION: 99 % | DIASTOLIC BLOOD PRESSURE: 78 MMHG | SYSTOLIC BLOOD PRESSURE: 185 MMHG

## 2020-01-28 DIAGNOSIS — E11.42 TYPE 2 DIABETES MELLITUS WITH DIABETIC POLYNEUROPATHY, WITH LONG-TERM CURRENT USE OF INSULIN (H): Primary | ICD-10-CM

## 2020-01-28 DIAGNOSIS — I10 ESSENTIAL HYPERTENSION: ICD-10-CM

## 2020-01-28 DIAGNOSIS — J06.9 VIRAL URI WITH COUGH: ICD-10-CM

## 2020-01-28 DIAGNOSIS — N18.30 CHRONIC KIDNEY DISEASE, STAGE III (MODERATE) (H): ICD-10-CM

## 2020-01-28 DIAGNOSIS — Z79.4 TYPE 2 DIABETES MELLITUS WITH DIABETIC POLYNEUROPATHY, WITH LONG-TERM CURRENT USE OF INSULIN (H): Primary | ICD-10-CM

## 2020-01-28 DIAGNOSIS — B18.1 CHRONIC HEPATITIS B VIRUS INFECTION (H): ICD-10-CM

## 2020-01-28 LAB — HBA1C MFR BLD: 7.4 % (ref 4.1–5.7)

## 2020-01-28 RX ORDER — AMLODIPINE BESYLATE 2.5 MG/1
2.5 TABLET ORAL 2 TIMES DAILY
Qty: 90 TABLET | Refills: 3 | Status: SHIPPED | OUTPATIENT
Start: 2020-01-28 | End: 2020-01-28

## 2020-01-28 RX ORDER — AMLODIPINE BESYLATE 2.5 MG/1
2.5 TABLET ORAL 2 TIMES DAILY
Qty: 180 TABLET | Refills: 3 | Status: SHIPPED | OUTPATIENT
Start: 2020-01-28 | End: 2020-11-10

## 2020-01-28 RX ORDER — GUAIFENESIN AND DEXTROMETHORPHAN HYDROBROMIDE 600; 30 MG/1; MG/1
1 TABLET, EXTENDED RELEASE ORAL EVERY 12 HOURS
Qty: 28 TABLET | Refills: 0 | Status: SHIPPED | OUTPATIENT
Start: 2020-01-28 | End: 2020-11-10

## 2020-01-28 NOTE — PROGRESS NOTES
SUBJECTIVE:  Patient presents with:  Diabetes: F/U DM  Medication Reconciliation: NEED ATTENTION, PT WOULD LIKE TO GO OVER WITH PROVIDER      1. Type 2 DM:  Now on Increase toujeo to 84 units may go up to 86 if AM fasting not 100-150 range  Same actos, same mealtime, has enough supplies, continue victoza  -some lows and has mealtime insulin max of 36 units and that helps, the lows are not bad    2. HTN:  -changed and split BP meds one in AM and one in PM, tolerating without issues    3. Cough: wonders if she could have medication to help, does note runny nose and irritating cough  Several days, thinks it's a cold she typically gets this season    BP Readings from Last 3 Encounters:   01/28/20 (!) 185/78   01/06/20 (!) 151/68   12/19/19 (!) 148/63     ROS:  GEN: no weight gain/loss  LUNGS: no wheeze, does have cough  COR: no chest pain, palpitations, PND  GI: denies pain, change in BM    OBJECTIVE:  BP (!) 185/78   Pulse 60   Temp 97.7  F (36.5  C) (Oral)   Resp 18   SpO2 99%      Gen: alert, oriented X 3, no acute distress, no sign of discomfort, wearing mask  HEENT: TMs normal bilaterally, gray, normal light reflex, no significant cerumen, OP without erythema, no cervical lymphadenopathy  LUNGS: CTAB, no wheezing, no rales, no crackles, no accessory muscle use  COR: normal rate, regular rhythm, 2/6 systolic murmur at sternal border    Results from last visit   Results for orders placed or performed in visit on 01/28/20   Hemoglobin A1c ([facility])     Status: Abnormal   Result Value Ref Range    Hemoglobin A1C 7.4 (H) 4.1 - 5.7 %       ASSESSMENT/PLAN:  (E11.42,  Z79.4) Type 2 diabetes mellitus with diabetic polyneuropathy, with long-term current use of insulin (H)  (primary encounter diagnosis)  Comment: congratulated on much improved results  Plan: Hemoglobin A1c ([facility])        No changes to insulin or daily medications, recheck in 3 months to ensure stable    (I10) Essential hypertension  Comment:  uncontrolled  Plan: amLODIPine (NORVASC) 2.5 MG tablet,         DISCONTINUED: amLODIPine (NORVASC) 2.5 MG         tablet        Will increase norvasc to BID (previously every day) patient to start changing and recheck in clinic in 2 weeks    (B18.1) Chronic hepatitis B virus infection (H)  Comment: Reviewed with patient, still not wanting to use the meds suggested by mngi  Plan: will continue to encourage patient to consider, high viral load, recheck viral load next visit    (N18.3) Chronic kidney disease, stage III (moderate) (H)  Comment: stable  Plan: amLODIPine (NORVASC) 2.5 MG tablet,         DISCONTINUED: amLODIPine (NORVASC) 2.5 MG         tablet            (J06.9,  B97.89) Viral URI with cough  Comment: if worse recheck  Plan: dextromethorphan-guaiFENesin (MUCINEX DM)          MG 12 hr tablet

## 2020-01-28 NOTE — NURSING NOTE
Due to patient being non-English speaking/uses sign language, an  was used for this visit. Only for face-to-face interpretation by an external agency, date and length of interpretation can be found on the scanned worksheet.     name: ELYSE DUBON  Agency: [employer]  Language: Bobby   Telephone number: [phone]  Type of interpretation: Face-to-face, spoken

## 2020-02-12 ENCOUNTER — ALLIED HEALTH/NURSE VISIT (OUTPATIENT)
Dept: FAMILY MEDICINE | Facility: CLINIC | Age: 65
End: 2020-02-12
Payer: COMMERCIAL

## 2020-02-12 VITALS
HEIGHT: 56 IN | RESPIRATION RATE: 20 BRPM | TEMPERATURE: 98.6 F | WEIGHT: 167.8 LBS | HEART RATE: 64 BPM | OXYGEN SATURATION: 98 % | BODY MASS INDEX: 37.75 KG/M2 | DIASTOLIC BLOOD PRESSURE: 68 MMHG | SYSTOLIC BLOOD PRESSURE: 151 MMHG

## 2020-02-12 DIAGNOSIS — I10 ESSENTIAL HYPERTENSION: Primary | ICD-10-CM

## 2020-02-12 ASSESSMENT — MIFFLIN-ST. JEOR: SCORE: 1169.14

## 2020-03-17 ENCOUNTER — TELEPHONE (OUTPATIENT)
Dept: FAMILY MEDICINE | Facility: CLINIC | Age: 65
End: 2020-03-17

## 2020-03-17 DIAGNOSIS — J06.9 VIRAL URI WITH COUGH: ICD-10-CM

## 2020-03-17 NOTE — TELEPHONE ENCOUNTER
[facility] phone call message- patient requesting a refill:    Full Medication Name: ALL MEDICATIONS    Dose:     Pharmacy confirmed as   [id number] - [place], [place] - [address]  [address] [place] [address]  Phone: [phone] Fax: [phone]  : Yes    Additional Comments: Patient called wanting to know if  can refill her medication possible for 2 months due to the virus going on. If unable too and patient needs appointment please call and advise patient.     OK to leave a message on voice mail? Yes    Primary language: Hmong      needed? Yes    Call taken on March 17, 2020 at 9:37 AM by Ariane Huff

## 2020-03-18 RX ORDER — GUAIFENESIN AND DEXTROMETHORPHAN HYDROBROMIDE 600; 30 MG/1; MG/1
1 TABLET, EXTENDED RELEASE ORAL EVERY 12 HOURS
Qty: 28 TABLET | Refills: 0 | Status: CANCELLED | OUTPATIENT
Start: 2020-03-18

## 2020-03-18 NOTE — TELEPHONE ENCOUNTER
Called [facility] pharmacy since patient has a years worth of fills on her Rx's. I spoke to chyna at pharmacy he stated patient has [employer] insurance and will only allow a one month supply for medications and that they do not automatically refill she will have to call to have them refill so she can pick them up.     I called patient using language line ID [id number] and left message asking to call me back regarding concerns. Her insurance will not allow a two month fill.

## 2020-03-23 NOTE — TELEPHONE ENCOUNTER
Spoke to patient, she was just wondering since the concern of the virus, was hoping for 3 months at a time, but I informed patient that is her insurance that will only covered one month supply, so when about to run out, call the pharmacy and have her children pick it up for her. She replied acknowledge of understanding. Thanks, KENJI Ugarte

## 2020-04-01 DIAGNOSIS — Z79.4 TYPE 2 DIABETES MELLITUS WITH DIABETIC POLYNEUROPATHY, WITH LONG-TERM CURRENT USE OF INSULIN (H): ICD-10-CM

## 2020-04-01 DIAGNOSIS — E11.42 TYPE 2 DIABETES MELLITUS WITH DIABETIC POLYNEUROPATHY, WITH LONG-TERM CURRENT USE OF INSULIN (H): ICD-10-CM

## 2020-04-01 DIAGNOSIS — H42 GLAUCOMA OF BOTH EYES ASSOCIATED WITH UNDERLYING DISEASE: ICD-10-CM

## 2020-04-01 RX ORDER — LATANOPROST 50 UG/ML
1 SOLUTION/ DROPS OPHTHALMIC DAILY
Qty: 2.5 ML | Refills: 3 | Status: SHIPPED | OUTPATIENT
Start: 2020-04-01 | End: 2020-06-04

## 2020-04-01 RX ORDER — INSULIN GLARGINE 300 U/ML
84 INJECTION, SOLUTION SUBCUTANEOUS AT BEDTIME
Qty: 12 ML | Refills: 3 | Status: SHIPPED | OUTPATIENT
Start: 2020-04-01 | End: 2020-04-16

## 2020-04-10 ENCOUNTER — TELEPHONE (OUTPATIENT)
Dept: FAMILY MEDICINE | Facility: CLINIC | Age: 65
End: 2020-04-10

## 2020-04-10 DIAGNOSIS — K21.9 GASTROESOPHAGEAL REFLUX DISEASE WITHOUT ESOPHAGITIS: ICD-10-CM

## 2020-04-10 NOTE — TELEPHONE ENCOUNTER
Ranitidine is no longer in the marketplace per pharmacy request for new medication. Requesting we send a new prescription. Message routed to Dr Benson.

## 2020-04-10 NOTE — TELEPHONE ENCOUNTER
prilosec is the alternative I would like you try.  You don't have to take every day.  If reflux is not bad it is ok to skip some days.

## 2020-04-13 NOTE — TELEPHONE ENCOUNTER
Called and informed patient.     SANDY Hernandez (Ayana) Dee [facility] [address] [place] [address] [phone]

## 2020-04-16 ENCOUNTER — TELEPHONE (OUTPATIENT)
Dept: FAMILY MEDICINE | Facility: CLINIC | Age: 65
End: 2020-04-16

## 2020-04-16 DIAGNOSIS — E11.42 TYPE 2 DIABETES MELLITUS WITH DIABETIC POLYNEUROPATHY, WITH LONG-TERM CURRENT USE OF INSULIN (H): Primary | ICD-10-CM

## 2020-04-16 DIAGNOSIS — Z79.4 TYPE 2 DIABETES MELLITUS WITH DIABETIC POLYNEUROPATHY, WITH LONG-TERM CURRENT USE OF INSULIN (H): Primary | ICD-10-CM

## 2020-04-16 NOTE — TELEPHONE ENCOUNTER
Prior Authorization needed on:  4/16/20    Medication:  Toujeo Dose:  300 unit/mL    Pharmacy confirmed as   [id number] - [place], [place] - [address] [place] [address]  Phone: [phone] Fax: [phone]  : Yes    Insurance Name:    Insurance Phone:   Insurance Patient ID:     Alternatives Suggested:  Levemir Flextouch 100 unit/mL, Levemir 100 unit/mL vial, lantus 100 unit/mL, humulin 70-30 vial, relion novolin 70-30 vial    LING MUNROE CMA April 16, 2020 at 11:12 AM

## 2020-06-01 DIAGNOSIS — E78.00 PURE HYPERCHOLESTEROLEMIA: ICD-10-CM

## 2020-06-01 DIAGNOSIS — E11.42 TYPE 2 DIABETES MELLITUS WITH DIABETIC POLYNEUROPATHY, WITH LONG-TERM CURRENT USE OF INSULIN (H): ICD-10-CM

## 2020-06-01 DIAGNOSIS — H42 GLAUCOMA OF BOTH EYES ASSOCIATED WITH UNDERLYING DISEASE: ICD-10-CM

## 2020-06-01 DIAGNOSIS — I10 ESSENTIAL HYPERTENSION: ICD-10-CM

## 2020-06-01 DIAGNOSIS — Z79.4 TYPE 2 DIABETES MELLITUS WITH DIABETIC POLYNEUROPATHY, WITH LONG-TERM CURRENT USE OF INSULIN (H): ICD-10-CM

## 2020-06-01 NOTE — TELEPHONE ENCOUNTER
[facility] phone call message- patient requesting a refill:    Full Medication Name:   VITAMIN D3  ATORVASTATIN  ASPIRIN LOW DOSE  LOSARTAN  PIOGLITAZONE  VICTOZA  NOVOLOG PEN  LEVEMIR PEN   LANTANOPROST    Dose:   1000 UNIT BY MOUTH  40 MG  81 MG  100-12.5 MG  30 MG  18MG/ML  100 UNIT  100UNIT  0.005%      Pharmacy confirmed as   [id number] - [place], [place] - [address] [facility]  [address] [place] [address]  Phone: [phone] Fax: [phone]  : No: PLEASE SEND TEMPORARILY TO [facility] [id number] [place] [id number]    Additional Comments:  PLEASE CALL PATIENT ONCE MEDICATION IS APPROVED AND SENT TO PHARMACY    OK to leave a message on voice mail? Yes    Primary language: Hmong      needed? Yes    Call taken on June 1, 2020 at 11:22 AM by Ariane Huff

## 2020-06-04 RX ORDER — LOSARTAN POTASSIUM AND HYDROCHLOROTHIAZIDE 12.5; 1 MG/1; MG/1
1 TABLET ORAL DAILY
Qty: 90 TABLET | Refills: 3 | Status: SHIPPED | OUTPATIENT
Start: 2020-06-04 | End: 2021-06-29

## 2020-06-04 RX ORDER — PIOGLITAZONEHYDROCHLORIDE 30 MG/1
30 TABLET ORAL DAILY
Qty: 30 TABLET | Refills: 11 | Status: SHIPPED | OUTPATIENT
Start: 2020-06-04 | End: 2021-06-03

## 2020-06-04 RX ORDER — LATANOPROST 50 UG/ML
1 SOLUTION/ DROPS OPHTHALMIC DAILY
Qty: 2.5 ML | Refills: 3 | Status: SHIPPED | OUTPATIENT
Start: 2020-06-04 | End: 2020-12-28

## 2020-06-04 RX ORDER — ASPIRIN 81 MG/1
81 TABLET, COATED ORAL DAILY
Qty: 90 TABLET | Refills: 3 | Status: SHIPPED | OUTPATIENT
Start: 2020-06-04 | End: 2021-06-18

## 2020-06-04 RX ORDER — LIRAGLUTIDE 6 MG/ML
1.8 INJECTION SUBCUTANEOUS DAILY
Qty: 27 ML | Refills: 3 | Status: SHIPPED | OUTPATIENT
Start: 2020-06-04 | End: 2021-06-29

## 2020-06-04 RX ORDER — ATORVASTATIN CALCIUM 40 MG/1
40 TABLET, FILM COATED ORAL DAILY
Qty: 90 TABLET | Refills: 3 | Status: SHIPPED | OUTPATIENT
Start: 2020-06-04 | End: 2021-06-29

## 2020-06-18 ENCOUNTER — OFFICE VISIT (OUTPATIENT)
Dept: FAMILY MEDICINE | Facility: CLINIC | Age: 65
End: 2020-06-18
Payer: COMMERCIAL

## 2020-06-18 VITALS
TEMPERATURE: 97.7 F | HEART RATE: 64 BPM | HEIGHT: 55 IN | WEIGHT: 177.4 LBS | DIASTOLIC BLOOD PRESSURE: 71 MMHG | RESPIRATION RATE: 18 BRPM | BODY MASS INDEX: 41.06 KG/M2 | SYSTOLIC BLOOD PRESSURE: 160 MMHG | OXYGEN SATURATION: 98 %

## 2020-06-18 DIAGNOSIS — K21.9 GASTROESOPHAGEAL REFLUX DISEASE WITHOUT ESOPHAGITIS: ICD-10-CM

## 2020-06-18 DIAGNOSIS — R06.09 DYSPNEA ON EXERTION: ICD-10-CM

## 2020-06-18 DIAGNOSIS — Z79.4 TYPE 2 DIABETES MELLITUS WITH DIABETIC POLYNEUROPATHY, WITH LONG-TERM CURRENT USE OF INSULIN (H): Primary | ICD-10-CM

## 2020-06-18 DIAGNOSIS — E11.42 TYPE 2 DIABETES MELLITUS WITH DIABETIC POLYNEUROPATHY, WITH LONG-TERM CURRENT USE OF INSULIN (H): Primary | ICD-10-CM

## 2020-06-18 DIAGNOSIS — L85.3 DRY SKIN: ICD-10-CM

## 2020-06-18 LAB — HBA1C MFR BLD: 8.2 % (ref 4.1–5.7)

## 2020-06-18 RX ORDER — AMMONIUM LACTATE 12 G/100G
CREAM TOPICAL 2 TIMES DAILY
Qty: 140 G | Refills: 1 | Status: SHIPPED | OUTPATIENT
Start: 2020-06-18 | End: 2020-10-05

## 2020-06-18 ASSESSMENT — MIFFLIN-ST. JEOR: SCORE: 1201.81

## 2020-06-18 NOTE — PROGRESS NOTES
"SUBJECTIVE:  Patient presents with:  Recheck Medication: was told tostop taking omeprazole by doesnt know why and acid has been bothering her  Medication Reconciliation: completed needs attention       1. Type 2 DM:  Now on lantus flexpen and only goes up to 80 units so just uses that at bedtime, didn't bring glucometer but states AM sugars are usually 100s    2. HTN:  -changed and split BP meds one in AM and one in PM, tolerating without issues  -not checking her BP at home.    3. Stomach pains:  Thought was supposed to stop the omeprazole, \"was called by clinic to stop the acid medication\"  -worsening stomach \"acid\" and upset irritated      4. Exertional dyspnea:-several months even before \"covid\"  -notices more with doing \"chores\" activities in the house  -takes a little bit of rest before feels normal breathing returns  -not painful in the chest  -denies wheezing      Family hx negative for known lung problems/no known asthma  -no hx of tobacco use      BP Readings from Last 3 Encounters:   06/18/20 (!) 160/71   02/12/20 (!) 151/68   01/28/20 (!) 185/78     ROS:  GEN: no weight gain/loss  LUNGS: no wheeze or cough,   COR: no chest pain, palpitations, PND  GI: denies pain, change in BM    OBJECTIVE:  BP (!) 160/71   Pulse 64   Temp 97.7  F (36.5  C) (Oral)   Resp 18   Ht 1.405 m (4' 7.32\")   Wt 80.5 kg (177 lb 6.4 oz)   SpO2 98%   BMI 40.76 kg/m       Gen: alert, oriented X 3, no acute distress, no sign of discomfort, wearing mask  HEENT: TMs normal bilaterally, gray, normal light reflex, no significant cerumen, OP without erythema, no cervical lymphadenopathy  LUNGS: CTAB, no wheezing, no rales, no crackles, no accessory muscle use  COR: normal rate, regular rhythm, 2/6 systolic murmur at sternal border    Wt Readings from Last 5 Encounters:   06/18/20 80.5 kg (177 lb 6.4 oz)   02/12/20 76.1 kg (167 lb 12.8 oz)   01/06/20 76.2 kg (168 lb)   11/25/19 74.1 kg (163 lb 6.4 oz)   10/24/19 70.3 kg (155 lb) "       Results from last visit   Results for orders placed or performed in visit on 06/18/20   Hemoglobin A1c ([facility])     Status: Abnormal   Result Value Ref Range    Hemoglobin A1C 8.2 (H) 4.1 - 5.7 %       Echo from 11/2019: normal EF 65%, trace Tricuspid/mitral insufficiency, no obvious diastolic dysfunction, no wall motion abnormality    CT chest 2017, 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 Calcified  -possibly deconditioning and obesity contributing,  -would help with a nuclear stress test to ensure not signifying ischemic dyspnea, need baseline ECG as no record noted in chart.    (K21.9) Gastroesophageal reflux disease without esophagitis  Comment: re-educated  Plan: informed ok to restart omeprazole    (L85.3) Dry skin  Comment: refilled  Plan: ammonium lactate (AMLACTIN) 12 % external cream

## 2020-06-23 ENCOUNTER — TELEPHONE (OUTPATIENT)
Dept: FAMILY MEDICINE | Facility: CLINIC | Age: 65
End: 2020-06-23

## 2020-06-23 NOTE — TELEPHONE ENCOUNTER
[facility] phone call message- general phone call:    Reason for call: Called pt to sched for F/U DM per in basket. Lvm.    Return call needed: Yes    OK to leave a message on voice mail? Yes    Primary language: Hmong      needed? Yes    Call taken on June 23, 2020 at 11:42 AM by Rosalie Santos

## 2020-06-30 NOTE — TELEPHONE ENCOUNTER
----- Message from Devendra Crain sent at 6/30/2020  3:50 PM CDT -----  Contact: Pt. [phone]  The patient would like to speak to someone regarding her BOTOX injecction. Please contact the patient to discuss further.         Patient running out of insulin early. Not able to fill month at a time. Pharmacy only providing 2 boxes at a time. Most recent Rx sent for 3 boxes (45 mL) to provide 36 days of insulin at a time.     Pharmacy unable to rerun prescription as just filled 5/9/18. Requested pharmacy to put note for next fill to attempt to run for 3 boxes at a time. Pharmacy not breaking open boxes.     If unsuccessful in future refill attempt, could consider transferring Rx to [facility] who does break open boxes of insulin pens. Could then fill for 12-13 pens = 36-39 mL for 29-31 day supply.

## 2020-07-13 ENCOUNTER — TELEPHONE (OUTPATIENT)
Dept: FAMILY MEDICINE | Facility: CLINIC | Age: 65
End: 2020-07-13

## 2020-07-13 DIAGNOSIS — E11.42 TYPE 2 DIABETES MELLITUS WITH DIABETIC POLYNEUROPATHY, WITH LONG-TERM CURRENT USE OF INSULIN (H): ICD-10-CM

## 2020-07-13 DIAGNOSIS — Z79.4 TYPE 2 DIABETES MELLITUS WITH DIABETIC POLYNEUROPATHY, WITH LONG-TERM CURRENT USE OF INSULIN (H): ICD-10-CM

## 2020-07-13 NOTE — TELEPHONE ENCOUNTER
[facility] phone call message- patient requesting a refill:    Full Medication Name: insulin detemir (LEVEMIR PEN)     Dose: 100 UNIT/ML pen     Pharmacy confirmed as   [id number] [place], [place] - [address]  [address] [place] [address]  Phone: [phone] Fax: [phone]    [facility] [id number] [place] [facility] [address]  [address] [facility] [address]  Phone: [phone] Fax: [phone]  : [facility]    Additional Comments: Pt is completely out of pen. Would like to get refills    OK to leave a message on voice mail? No    Primary language: Hmong      needed? Yes    Call taken on July 13, 2020 at 8:43 AM by Rosalie Santos

## 2020-07-22 ENCOUNTER — TELEPHONE (OUTPATIENT)
Dept: FAMILY MEDICINE | Facility: CLINIC | Age: 65
End: 2020-07-22

## 2020-07-22 NOTE — PROGRESS NOTES
SUBJECTIVE:            Omid Adorno is a 64 year old female, here alone, in today for:    Chief Complaint   Patient presents with     Diabetes     follow up.      Follow Up     Hx of TB, repeat chest xray     Medication Reconciliation     completed     1. Hx of TB and TB exposure at adult :  -per [facility] has regular CXR follow up as didn't take latent TB medication  -denies symptoms of cough or night sweats    2.  Chronic dyspnea: thinks it is better  -has been out with kids and able to walk and perhaps she's getting better, no concerns today    3.  DM: due for foot exam, thinks sugars are lower  -checking sugars AM, some random afternoons  -glucometer shows ave 30day 140      A [employer]  was used for this visit  ---------------------------------------------------------------------------------------------------------------------  Patient Active Problem List   Diagnosis     Health Care Home     Hepatitis B carrier (H)     Chronic kidney disease, stage III (moderate) (H)     Pure hypercholesterolemia     Essential hypertension     Obesity     Vitamin D deficiency     Major depressive disorder, recurrent episode, moderate (H)     Dyspepsia     Glaucoma of both eyes associated with underlying disease     Other lymphedema     Type 2 diabetes mellitus with diabetic polyneuropathy, with long-term current use of insulin (H)     Chronic hepatitis B virus infection (H)     Inflammatory polyarthropathy (H)     Latent tuberculosis, 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     Past Surgical History:   Procedure Laterality Date     NO HISTORY OF SURGERY         Social History     Tobacco Use     Smoking status: Never Smoker     Smokeless tobacco: Never Used     Tobacco comment: no exposure at home per pt   Substance Use Topics     Alcohol use: No     Alcohol/week: 0.0 standard drinks     Family History   Problem Relation Age of Onset     Cancer No family hx of      Diabetes No family hx of      Cardiovascular No family hx of       "Coronary Artery Disease No family hx of      Cerebrovascular Disease No family hx of      Breast Cancer No family hx of      Colon Cancer No family hx of      Other Cancer No family hx of      Prostate Cancer No family hx of      Hypertension No family hx of      Asthma No family hx of          ROS:  GEN: no weight gain/loss  LUNGS: no dyspnea, wheeze, cough  COR: no chest pain, palpitations, PND    Problem list, Medication list, Allergies, and Medical/Social/Surgical histories reviewed in [facility] and updated as appropriate.    OBJECTIVE:                          /74   Pulse 57   Temp 97.7  F (36.5  C) (Oral)   Resp 16   Ht 1.415 m (4' 7.71\")   Wt 78.5 kg (173 lb)   SpO2 99%   BMI 39.19 kg/m     GENERAL: healthy, alert, well nourished, well hydrated, no distress  RESP: lungs clear to auscultation - no rales, no rhonchi, no wheezes  CV: regular rates and rhythm, normal S1 S2, no S3 or S4 and no murmur, no click or rub -  Diabetic Foot Screen:  Any complaints of increased pain or numbness ? No  Is there a foot ulcer now or a history of foot ulcer? No  Does the foot have an abnormal shape? No  Are the nails thick, too long or ingrown? No  Are there any redness or open areas? No         Sensation Testing done at all points on the diagram with monofilament     Right Foot: Sensation Normal at all points  Left Foot: Sensation Normal at all points     Risk Category: 0- No loss of protective sensation  Performed by Shannan Benson MD       Diagnostic testing:(labs, x-rays, EKG) - CXR: initial review of xray normal, await radiology review    ASSESSMENT/PLAN:            (Z86.11) Hx of primary tuberculosis  (primary encounter diagnosis)  Comment: repeat exposure at adult   Plan: XR CHEST 2 VW        Await final report and no symptoms    (E11.42,  Z79.4) Type 2 diabetes mellitus with diabetic polyneuropathy, with long-term current use of insulin (H)  Comment: appears controlled by glucometer " intermittent  Plan: C FOOT EXAM  NO CHARGE, EKG 12-lead complete         w/read - Clinics        No changes in meds, recheck in August.    Risks, benefits and alternatives of treatments discussed. Plan agreed on.      Followup:August    Will call, return to clinic, or go to ED if worsening or symptoms not improving as discussed.    See patient instructions.     Health Maintenance Due   Topic Date Due     DEPRESSION ACTION PLAN  1955     ZOSTER IMMUNIZATION (1 of 2) 08/06/2005     PREVENTIVE CARE VISIT  11/17/2016     EYE EXAM  10/28/2017     COLORECTAL CANCER SCREENING  07/10/2018     PHQ-9  01/31/2019     MAMMO SCREENING  03/16/2019     LIPID  03/08/2020     DIABETIC FOOT EXAM  03/08/2020     HPV TEST  11/17/2020     PAP  11/17/2020     Health maintenance reviewed/updated? Yes    Shannan Benson MD  [facility]

## 2020-07-22 NOTE — TELEPHONE ENCOUNTER
Called [facility] for more information on upcoming appointment r/t infection prevention and precautions to be in place. No answer, left  to call clinic back. Chi FELICIANO

## 2020-07-23 ENCOUNTER — OFFICE VISIT (OUTPATIENT)
Dept: FAMILY MEDICINE | Facility: CLINIC | Age: 65
End: 2020-07-23
Payer: COMMERCIAL

## 2020-07-23 VITALS
HEIGHT: 56 IN | DIASTOLIC BLOOD PRESSURE: 74 MMHG | TEMPERATURE: 97.7 F | HEART RATE: 57 BPM | OXYGEN SATURATION: 99 % | SYSTOLIC BLOOD PRESSURE: 126 MMHG | RESPIRATION RATE: 16 BRPM | BODY MASS INDEX: 38.92 KG/M2 | WEIGHT: 173 LBS

## 2020-07-23 DIAGNOSIS — Z79.4 TYPE 2 DIABETES MELLITUS WITH DIABETIC POLYNEUROPATHY, WITH LONG-TERM CURRENT USE OF INSULIN (H): ICD-10-CM

## 2020-07-23 DIAGNOSIS — Z86.11: Primary | ICD-10-CM

## 2020-07-23 DIAGNOSIS — E11.42 TYPE 2 DIABETES MELLITUS WITH DIABETIC POLYNEUROPATHY, WITH LONG-TERM CURRENT USE OF INSULIN (H): ICD-10-CM

## 2020-07-23 ASSESSMENT — MIFFLIN-ST. JEOR: SCORE: 1188.1

## 2020-07-23 NOTE — TELEPHONE ENCOUNTER
TB symptom screen required. An  was used for this call. Denies fever, cough, recent travel, or night sweats. [facility] informed me patient had positive exposure in December and opted in for monitoring. If symptom-free, not considered active status. Will mask patient per clinic protocol. Rooming staff notified of asymptomatic. Chi FELICIANO

## 2020-07-23 NOTE — NURSING NOTE
"Chief Complaint   Patient presents with     Diabetes     follow up.      Follow Up     Hx of TB, repeat chest xray     Medication Reconciliation     completed       /74   Pulse 57   Temp 97.7  F (36.5  C) (Oral)   Resp 16   Ht 1.415 m (4' 7.71\")   Wt 78.5 kg (173 lb)   SpO2 99%   BMI 39.19 kg/m        ~ Ezequiel Price CMA ([place])  [facility]  Phone: [phone]    Labs due: Lipids  DM Foot exam  Mammogram- declined last year and stated will do this year, but due to COVID and + TB, provider to discuss--- declines  Colon Cancer screening-- Provider to discuss-- declines, BUT FIT test pended and kit given  Ezequiel Price CMA      Due to patient being non-English speaking/uses sign language, an  was used for this visit. Only for face-to-face interpretation by an external agency, date and length of interpretation can be found on the scanned worksheet.     name: Miguel Felix  Language: Hmong  Agency: [employer]  Phone number: [phone]  Type of interpretation: Telephone, spoken    "

## 2020-08-05 NOTE — PROGRESS NOTES
SUBJECTIVE:            Omid Adorno is a 64 year old female, here alone, in today for:  Chief Complaint   Patient presents with     Shoulder Pain     f/u ongoing left shoulder pain, want xray     Medication Reconciliation     complete         1. Left shoulder pain: began hurting in 2006 (was both shoulders and moving)  2 months has been quite severe  -intermittent, no obvious event/injury, seems to come and go  -more pain in the front of shoulder, but hard to localize, not seeming to be point tender when she tries to push on shoulder  -feels like a pinch, sharp, hurts when pulling, reaching, dressing  -denies shooting pain down the arm, sometimes radiates into the neck  -worse at night and unable to lay on the left side    A [email or web address]  was used for this visit  ---------------------------------------------------------------------------------------------------------------------  Patient Active Problem List   Diagnosis     Health Care Home     Hepatitis B carrier (H)     Chronic kidney disease, stage III (moderate) (H)     Pure hypercholesterolemia     Essential hypertension     Obesity     Vitamin D deficiency     Major depressive disorder, recurrent episode, moderate (H)     Dyspepsia     Glaucoma of both eyes associated with underlying disease     Other lymphedema     Type 2 diabetes mellitus with diabetic polyneuropathy, with long-term current use of insulin (H)     Chronic hepatitis B virus infection (H)     Inflammatory polyarthropathy (H)     Latent tuberculosis, 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     Past Surgical History:   Procedure Laterality Date     NO HISTORY OF SURGERY         Social History     Tobacco Use     Smoking status: Never Smoker     Smokeless tobacco: Never Used     Tobacco comment: no exposure at home per pt   Substance Use Topics     Alcohol use: No     Alcohol/week: 0.0 standard drinks     Family History   Problem Relation Age of Onset     Cancer No family hx of      Diabetes No family hx  "of      Cardiovascular No family hx of      Coronary Artery Disease No family hx of      Cerebrovascular Disease No family hx of      Breast Cancer No family hx of      Colon Cancer No family hx of      Other Cancer No family hx of      Prostate Cancer No family hx of      Hypertension No family hx of      Asthma No family hx of          ROS:  Denies dyspnea, cough, covid exposure    Problem list, Medication list, Allergies, and Medical/Social/Surgical histories reviewed in [facility] and updated as appropriate.    OBJECTIVE:                          BP (!) 148/73   Pulse 56   Temp 97.8  F (36.6  C) (Oral)   Resp 18   Ht 1.422 m (4' 8\")   Wt 78 kg (172 lb)   SpO2 99%   BMI 38.56 kg/m     GENERAL: healthy, alert, well nourished, well hydrated, no distress  Neck: full ROM. Negative Spurlings.  Shoulder:bilateral skin without erythema, swelling or ecchymosis. No atrophy.  Non tender to palpation over the SC joint, clavicle, AC joint, or bicipital groove..   ROM right: Flexion to 180 deg. Abduction to 170 deg, IR and ER full, No pain with cross body adduction.   ROM LEFT: Flexion to 90 deg. Abduction to 90 deg, IR and ER unable with pain and pain with cross body adduction.   STRENGTH: right 5/5 in abduction, ER, IR, . 5/5 strength with  supraspinatus testing.   STRENGTH: LEFT  3/5 in abduction, ER, IR, . 3/5 strength with  supraspinatus testing.  Special tests LEFT: Positive [place].  Obriens  testing.  poured can testing.  Neuro: Sensation symmetric and intact.        Diagnostic testing:(labs, x-rays, EKG) - None    ASSESSMENT/PLAN:              ICD-10-CM    1. Left shoulder pain, unspecified chronicity  M25.512 MR Shoulder Left w/o Contrast   2. Impingement syndrome, shoulder, left  M75.42 MR Shoulder Left w/o Contrast     Reviewed no xray available and likely MRI would provide more info about rotator cuff.  Unsure if significant arthritis in the shoulder.  Confusing hx of \"rheumatoid\" and joint changes in fingers " appear very old.    Discuss options briefly: PT, Cortisone and surgical.    Risks, benefits and alternatives of treatments discussed. Plan agreed on.      Followup: by phone after imaging    Will call, return to clinic, or go to ED if worsening or symptoms not improving as discussed.    See patient instructions.     Health Maintenance Due   Topic Date Due     DEXA  1955     DEPRESSION ACTION PLAN  1955     ZOSTER IMMUNIZATION (1 of 2) 08/06/2005     EYE EXAM  10/28/2017     COLORECTAL CANCER SCREENING  07/10/2018     PHQ-9  01/31/2019     MAMMO SCREENING  03/16/2019     LIPID  03/08/2020     MEDICARE ANNUAL WELLNESS VISIT  08/06/2020     FALL RISK ASSESSMENT  08/06/2020     PNEUMOCOCCAL IMMUNIZATION 65+ LOW/MEDIUM RISK (1 of 2 - PCV13) 08/06/2020     Health maintenance reviewed/updated? No    Shannan Benson MD  [facility]

## 2020-08-06 ENCOUNTER — OFFICE VISIT (OUTPATIENT)
Dept: FAMILY MEDICINE | Facility: CLINIC | Age: 65
End: 2020-08-06
Payer: COMMERCIAL

## 2020-08-06 ENCOUNTER — RECORDS - HEALTHEAST (OUTPATIENT)
Dept: ADMINISTRATIVE | Facility: OTHER | Age: 65
End: 2020-08-06

## 2020-08-06 VITALS
BODY MASS INDEX: 38.69 KG/M2 | HEART RATE: 56 BPM | SYSTOLIC BLOOD PRESSURE: 138 MMHG | DIASTOLIC BLOOD PRESSURE: 75 MMHG | OXYGEN SATURATION: 99 % | WEIGHT: 172 LBS | TEMPERATURE: 97.8 F | HEIGHT: 56 IN | RESPIRATION RATE: 18 BRPM

## 2020-08-06 DIAGNOSIS — M25.512 LEFT SHOULDER PAIN, UNSPECIFIED CHRONICITY: Primary | ICD-10-CM

## 2020-08-06 DIAGNOSIS — M75.42 IMPINGEMENT SYNDROME, SHOULDER, LEFT: ICD-10-CM

## 2020-08-06 ASSESSMENT — MIFFLIN-ST. JEOR: SCORE: 1183.19

## 2020-08-06 NOTE — PATIENT INSTRUCTIONS
Referral for :     MRI Left Shoulder   LOCATION/PLACE/Provider :    [facility]   DATE & TIME :     Aug. 7th at 4pm   PHONE :     [phone]  FAX :    [phone]  Appointment made by clinic staff/:    Rowena

## 2020-08-07 ENCOUNTER — HOSPITAL ENCOUNTER (OUTPATIENT)
Dept: MRI IMAGING | Facility: HOSPITAL | Age: 65
Discharge: HOME | End: 2020-08-07
Attending: FAMILY MEDICINE

## 2020-08-07 DIAGNOSIS — M25.512 LEFT SHOULDER PAIN, UNSPECIFIED CHRONICITY: ICD-10-CM

## 2020-08-07 DIAGNOSIS — M75.42 IMPINGEMENT SYNDROME OF SHOULDER, LEFT: ICD-10-CM

## 2020-08-07 RX ORDER — CHOLECALCIFEROL (VITAMIN D3) 25 MCG
TABLET ORAL
COMMUNITY
Start: 2020-07-27 | End: 2020-10-29

## 2020-08-11 NOTE — RESULT ENCOUNTER NOTE
Called patient with results. Appt scheduled for 9/10/2020    ~ Ezequiel HOOD (Chrissi) [facility]  [facility]  [phone]

## 2020-08-11 NOTE — RESULT ENCOUNTER NOTE
Call patient:    MRI does show rotator cuff tendonitis.  This should improve with a cortisone injection.  Please schedule an appointment for this with me.

## 2020-08-12 DIAGNOSIS — E11.42 TYPE 2 DIABETES MELLITUS WITH DIABETIC POLYNEUROPATHY, WITH LONG-TERM CURRENT USE OF INSULIN (H): ICD-10-CM

## 2020-08-12 DIAGNOSIS — Z79.4 TYPE 2 DIABETES MELLITUS WITH DIABETIC POLYNEUROPATHY, WITH LONG-TERM CURRENT USE OF INSULIN (H): ICD-10-CM

## 2020-08-12 NOTE — TELEPHONE ENCOUNTER
Message to physician:     Date of last visit: 8/6/2020     Date of next visit if scheduled: Visit date not found       Last Comprehensive Metabolic Panel:  Sodium   Date Value Ref Range Status   12/19/2019 143.0 133.0 - 144.0 mmol/L Final     Potassium   Date Value Ref Range Status   12/19/2019 4.8 3.4 - 5.3 mmol/L Final     Chloride   Date Value Ref Range Status   12/19/2019 108.0 94.0 - 109.0 mmol/L Final     Carbon Dioxide   Date Value Ref Range Status   12/19/2019 27.0 20.0 - 32.0 mmol/L Final     Glucose   Date Value Ref Range Status   12/19/2019 124.0 (H) 60.0 - 109.0 mg/dL Final     Urea Nitrogen   Date Value Ref Range Status   12/19/2019 41.0 (H) 7.0 - 30.0 mg/dL Final     Creatinine   Date Value Ref Range Status   12/19/2019 1.6 (H) 0.6 - 1.3 mg/dL Final     GFR Estimate   Date Value Ref Range Status   07/19/2017 40 (L) >60 mL/min/1.73m2 Final     Calcium   Date Value Ref Range Status   12/19/2019 9.7 8.5 - 10.4 mg/dL Final       BP Readings from Last 3 Encounters:   08/06/20 138/75   07/23/20 126/74   06/18/20 (!) 160/71       Lab Results   Component Value Date    A1C 8.2 06/18/2020    A1C 7.4 01/28/2020    A1C 10.3 10/24/2019    A1C 9.6 06/11/2019    A1C 9.7 03/08/2019                Please complete refill and CLOSE ENCOUNTER.  Closing the encounter signifies the refill is complete.

## 2020-08-19 ENCOUNTER — TELEPHONE (OUTPATIENT)
Dept: FAMILY MEDICINE | Facility: CLINIC | Age: 65
End: 2020-08-19

## 2020-08-19 NOTE — TELEPHONE ENCOUNTER
[facility] phone call message- medication clarification/question:    Full Medication Name:     Question: Pt called stated that she received results for her MRI and was informed to schedule to come in for an injection. Pt was wandering if she can get a medication prescription first for it before she tries the injection. Pls call and advise.    Pharmacy confirmed as    [id number] - [place], [place] - [address] [facility] [address] [place] [address]: Yes    OK to leave a message on voice mail? Yes    Primary language: Hmong      needed? Yes    Call taken on August 19, 2020 at 9:04 AM by Rosalie Santos

## 2020-08-19 NOTE — TELEPHONE ENCOUNTER
Based on MRI it is fairly advanced, to get the best motion back in her arm would need the injection and physical therapy too.  Taking a pill would only slightly reduce the pain and not improve your shoulder.

## 2020-09-09 NOTE — PROGRESS NOTES
SUBJECTIVE:            Omid Adorno is a 64 year old female, here alone, in today for:  Chief Complaint   Patient presents with     Shoulder Pain     f/u left shoulder pain, pain improving.      Medication Reconciliation     complete       1. Left shoulder pain: began hurting in 2006 (was both shoulders and moving)  2 months has been quite severe  Had MRI showing severe supraspinatus tendonitis/tendinopathy  -has been trying ROM exercises, meds and actually much improved    2.DM:  Sugar this AM was 80s, later in day has numbers usually in 100-200s    3. HTN: taking BP at home 130-140s systolic usually    A ICONOGRAFICO  was used for this visit  ---------------------------------------------------------------------------------------------------------------------  Patient Active Problem List   Diagnosis     Health Care Home     Hepatitis B carrier (H)     Chronic kidney disease, stage III (moderate) (H)     Pure hypercholesterolemia     Essential hypertension     Obesity     Vitamin D deficiency     Major depressive disorder, recurrent episode, moderate (H)     Dyspepsia     Glaucoma of both eyes associated with underlying disease     Other lymphedema     Type 2 diabetes mellitus with diabetic polyneuropathy, with long-term current use of insulin (H)     Chronic hepatitis B virus infection (H)     Inflammatory polyarthropathy (H)     Latent tuberculosis, 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     Past Surgical History:   Procedure Laterality Date     NO HISTORY OF SURGERY         Social History     Tobacco Use     Smoking status: Never Smoker     Smokeless tobacco: Never Used     Tobacco comment: no exposure at home per pt   Substance Use Topics     Alcohol use: No     Alcohol/week: 0.0 standard drinks     Family History   Problem Relation Age of Onset     Cancer No family hx of      Diabetes No family hx of      Cardiovascular No family hx of      Coronary Artery Disease No family hx of      Cerebrovascular Disease No  "family hx of      Breast Cancer No family hx of      Colon Cancer No family hx of      Other Cancer No family hx of      Prostate Cancer No family hx of      Hypertension No family hx of      Asthma No family hx of          ROS:  Denies dyspnea, cough, covid exposure    Problem list, Medication list, Allergies, and Medical/Social/Surgical histories reviewed in [facility] and updated as appropriate.    OBJECTIVE:                          BP (!) 166/76 (BP Location: Right arm)   Pulse 62   Temp 98  F (36.7  C) (Oral)   Resp 16   Ht 1.422 m (4' 8\")   Wt 77.6 kg (171 lb)   SpO2 100%   BMI 38.34 kg/m     GENERAL: healthy, alert, well nourished, well hydrated, no distress  Neck: full ROM. Negative Spurlings.  Shoulder:bilateral skin without erythema, swelling or ecchymosis. No atrophy.  Non tender to palpation over the SC joint, clavicle, AC joint, or bicipital groove..   ROM right: Flexion to 180 deg. Abduction to 170 deg, IR and ER full, No pain with cross body adduction.   ROM LEFT: Flexion to 180 deg. Abduction to 170 deg, IR and ER  Mildly limited internal, mild pain with cross body adduction.   STRENGTH: right 5/5 in abduction, ER, IR, . 5/5 strength with  supraspinatus testing.   STRENGTH: LEFT  5/5 in abduction, ER, IR, . /5 strength with  supraspinatus testing.  Special tests LEFT:negative poured can sign  Neuro: Sensation symmetric and intact.        Diagnostic testing:(labs, x-rays, EKG) - None MRI reviewed    ASSESSMENT/PLAN:              ICD-10-CM    1. Disorder of bursae and tendons in shoulder region  M71.9     M67.919    2. Type 2 diabetes mellitus with diabetic polyneuropathy, with long-term current use of insulin (H)  E11.42 Hemoglobin A1c ([facility])    Z79.4 Lipid Panel ([facility]) - Results < 1 hr     CANCELED: Hemoglobin A1c ([facility])     CANCELED: Lipid Panel ([facility]) - Results < 1 hr     Much improved, no need for cortisone injection.  Reeducated about ROM exercises, ok for tylenol    DM and HTN: recheck " labs planned after 9/18.  Current BP mildly elevated, if persists may need to adjust meds at next visit.    Risks, benefits and alternatives of treatments discussed. Plan agreed on.      Will call, return to clinic, or go to ED if worsening or symptoms not improving as discussed.    See patient instructions.     Health Maintenance Due   Topic Date Due     DEXA  1955     DEPRESSION ACTION PLAN  1955     ZOSTER IMMUNIZATION (1 of 2) 08/06/2005     EYE EXAM  10/28/2017     COLORECTAL CANCER SCREENING  07/10/2018     PHQ-9  01/31/2019     MAMMO SCREENING  03/16/2019     LIPID  03/08/2020     MEDICARE ANNUAL WELLNESS VISIT  08/06/2020     FALL RISK ASSESSMENT  08/06/2020     INFLUENZA VACCINE (1) 09/01/2020     PNEUMOCOCCAL IMMUNIZATION 65+ LOW/MEDIUM RISK (1 of 2 - PCV13) 08/06/2020     A1C  09/18/2020     Health maintenance reviewed/updated? No    Shannan Benson MD  [facility]

## 2020-09-10 ENCOUNTER — OFFICE VISIT (OUTPATIENT)
Dept: FAMILY MEDICINE | Facility: CLINIC | Age: 65
End: 2020-09-10

## 2020-09-10 VITALS
TEMPERATURE: 98 F | WEIGHT: 171 LBS | RESPIRATION RATE: 16 BRPM | HEIGHT: 56 IN | SYSTOLIC BLOOD PRESSURE: 166 MMHG | OXYGEN SATURATION: 100 % | DIASTOLIC BLOOD PRESSURE: 76 MMHG | HEART RATE: 62 BPM | BODY MASS INDEX: 38.46 KG/M2

## 2020-09-10 DIAGNOSIS — M71.9 DISORDER OF BURSAE AND TENDONS IN SHOULDER REGION: Primary | ICD-10-CM

## 2020-09-10 DIAGNOSIS — E11.42 TYPE 2 DIABETES MELLITUS WITH DIABETIC POLYNEUROPATHY, WITH LONG-TERM CURRENT USE OF INSULIN (H): ICD-10-CM

## 2020-09-10 DIAGNOSIS — Z79.4 TYPE 2 DIABETES MELLITUS WITH DIABETIC POLYNEUROPATHY, WITH LONG-TERM CURRENT USE OF INSULIN (H): ICD-10-CM

## 2020-09-10 DIAGNOSIS — M67.919 DISORDER OF BURSAE AND TENDONS IN SHOULDER REGION: Primary | ICD-10-CM

## 2020-09-10 RX ORDER — TRIAMCINOLONE ACETONIDE 40 MG/ML
40 INJECTION, SUSPENSION INTRA-ARTICULAR; INTRAMUSCULAR ONCE
Status: CANCELLED | OUTPATIENT
Start: 2020-09-10 | End: 2020-09-10

## 2020-09-10 ASSESSMENT — MIFFLIN-ST. JEOR: SCORE: 1178.65

## 2020-09-10 NOTE — NURSING NOTE
Due to patient being non-English speaking/uses sign language, an  was used for this visit. Only for face-to-face interpretation by an external agency, date and length of interpretation can be found on the scanned worksheet.     name: jaylen tran  Agency: [employer]  Language: Bobby   Telephone number: [phone]  Type of interpretation: Telephone, spoken

## 2020-10-02 ENCOUNTER — ALLIED HEALTH/NURSE VISIT (OUTPATIENT)
Dept: FAMILY MEDICINE | Facility: CLINIC | Age: 65
End: 2020-10-02
Payer: COMMERCIAL

## 2020-10-02 VITALS
DIASTOLIC BLOOD PRESSURE: 72 MMHG | OXYGEN SATURATION: 97 % | HEART RATE: 66 BPM | RESPIRATION RATE: 18 BRPM | SYSTOLIC BLOOD PRESSURE: 144 MMHG

## 2020-10-02 DIAGNOSIS — Z79.4 TYPE 2 DIABETES MELLITUS WITH DIABETIC POLYNEUROPATHY, WITH LONG-TERM CURRENT USE OF INSULIN (H): ICD-10-CM

## 2020-10-02 DIAGNOSIS — E11.42 TYPE 2 DIABETES MELLITUS WITH DIABETIC POLYNEUROPATHY, WITH LONG-TERM CURRENT USE OF INSULIN (H): ICD-10-CM

## 2020-10-02 DIAGNOSIS — Z23 NEED FOR PROPHYLACTIC VACCINATION AND INOCULATION AGAINST INFLUENZA: Primary | ICD-10-CM

## 2020-10-02 LAB
CHOLEST SERPL-MCNC: 137 MG/DL
CHOLEST/HDLC SERPL: 2.6 RATIO
HBA1C MFR BLD: 8.5 % (ref 4.1–5.7)
HDLC SERPL-MCNC: 53 MG/DL
LDLC SERPL CALC-MCNC: 52 MG/DL (ref 0–99)
TRIGL SERPL-MCNC: 162 MG/DL
VLDL-CHOLESTEROL: 32 MG/DL (ref 7–32)

## 2020-10-02 PROCEDURE — 90471 IMMUNIZATION ADMIN: CPT

## 2020-10-02 PROCEDURE — 80061 LIPID PANEL: CPT

## 2020-10-02 PROCEDURE — 90662 IIV NO PRSV INCREASED AG IM: CPT

## 2020-10-02 PROCEDURE — 36415 COLL VENOUS BLD VENIPUNCTURE: CPT

## 2020-10-02 PROCEDURE — 83036 HEMOGLOBIN GLYCOSYLATED A1C: CPT

## 2020-10-02 RX ORDER — CHOLECALCIFEROL (VITAMIN D3) 25 MCG
TABLET ORAL
COMMUNITY
Start: 2020-07-27 | End: 2021-10-19

## 2020-10-02 NOTE — RESULT ENCOUNTER NOTE
Call patient:    DM is slightly higher A1c from 3 months ago, not as well controlled.  Would like to increase insulin, please come in and we can discuss that option.  Can recheck bp at that time as well.  Anytime in October is fine.  Cholesterol levels are stable.

## 2020-10-05 DIAGNOSIS — Z79.4 TYPE 2 DIABETES MELLITUS WITH DIABETIC POLYNEUROPATHY, WITH LONG-TERM CURRENT USE OF INSULIN (H): ICD-10-CM

## 2020-10-05 DIAGNOSIS — L85.3 DRY SKIN: ICD-10-CM

## 2020-10-05 DIAGNOSIS — M05.742 RHEUMATOID ARTHRITIS INVOLVING BOTH HANDS WITH POSITIVE RHEUMATOID FACTOR (H): ICD-10-CM

## 2020-10-05 DIAGNOSIS — M05.741 RHEUMATOID ARTHRITIS INVOLVING BOTH HANDS WITH POSITIVE RHEUMATOID FACTOR (H): ICD-10-CM

## 2020-10-05 DIAGNOSIS — K21.9 GASTROESOPHAGEAL REFLUX DISEASE WITHOUT ESOPHAGITIS: ICD-10-CM

## 2020-10-05 DIAGNOSIS — E11.42 TYPE 2 DIABETES MELLITUS WITH DIABETIC POLYNEUROPATHY, WITH LONG-TERM CURRENT USE OF INSULIN (H): ICD-10-CM

## 2020-10-05 RX ORDER — AMMONIUM LACTATE 12 G/100G
CREAM TOPICAL 2 TIMES DAILY
Qty: 140 G | Refills: 1 | Status: SHIPPED | OUTPATIENT
Start: 2020-10-05 | End: 2020-12-28

## 2020-10-05 RX ORDER — ACETAMINOPHEN 325 MG/1
650 TABLET ORAL 4 TIMES DAILY
Qty: 100 TABLET | Refills: 3 | Status: SHIPPED | OUTPATIENT
Start: 2020-10-05 | End: 2021-03-23

## 2020-10-05 NOTE — TELEPHONE ENCOUNTER
Based on most recent a1c    Lab Results   Component Value Date    A1C 8.5 10/02/2020    A1C 8.2 06/18/2020    A1C 7.4 01/28/2020    A1C 10.3 10/24/2019    A1C 9.6 06/11/2019       Increase levimir had been 80 units subcutaneous daily.      New instructions are now levimir 50 units subcutaneous twice daily.    Has appt with me 11/1 follow up and call with any questions on new insulin amount if issues noted in October.

## 2020-10-06 NOTE — TELEPHONE ENCOUNTER
An  was used for this call. Called patient and advised of dosage change to levemir. Patient stated she is unable to read the labels. Advised this is the insulin that was 80units once daily, now to take 50 units in the morning and evening. Patient verbalized understanding with teach-back method. Patient then c/o too soon of a fill, advised due to dosage change insurance will cover. Patient verbalized understanding. Patient may benefit from bill box or colored indicators on pill bottles to identify medications and can coordinate at upcoming appointment. Routing to PCP for FYI for upcoming visit. Chi FELICIANO

## 2020-10-29 DIAGNOSIS — E55.9 VITAMIN D DEFICIENCY: Primary | ICD-10-CM

## 2020-10-29 RX ORDER — CHOLECALCIFEROL (VITAMIN D3) 25 MCG
1 TABLET ORAL DAILY
Qty: 90 TABLET | Refills: 3 | Status: SHIPPED | OUTPATIENT
Start: 2020-10-29 | End: 2021-11-11

## 2020-11-09 ENCOUNTER — TELEPHONE (OUTPATIENT)
Dept: FAMILY MEDICINE | Facility: CLINIC | Age: 65
End: 2020-11-09

## 2020-11-09 NOTE — TELEPHONE ENCOUNTER
Omid Adorno called to schedule an appointment for TB screening.        TB Screening questions  1. Have you had recent contact with a person with active tuberculosis (TB)?  No, continue to next question.  2. Have you ever been treated for tuberculosis (TB) or latent TB before?  No, continue to next question.  3. Has a county worker or another healthcare worker (not your employer) told you to come in to be tested for TB?  No, continue to next question.  4. Have you had a live vaccine (smallpox, flumist, MMR, varicella, oral polio and/or yellow fever) in the last 4 weeks?  No, lab visit scheduled.       Provider visit scheduled for 11/10/20.    Rosalie Santos

## 2020-11-10 ENCOUNTER — OFFICE VISIT (OUTPATIENT)
Dept: FAMILY MEDICINE | Facility: CLINIC | Age: 65
End: 2020-11-10
Payer: COMMERCIAL

## 2020-11-10 VITALS
HEIGHT: 56 IN | DIASTOLIC BLOOD PRESSURE: 70 MMHG | BODY MASS INDEX: 39.55 KG/M2 | WEIGHT: 175.8 LBS | HEART RATE: 62 BPM | RESPIRATION RATE: 18 BRPM | OXYGEN SATURATION: 98 % | TEMPERATURE: 98.3 F | SYSTOLIC BLOOD PRESSURE: 135 MMHG

## 2020-11-10 DIAGNOSIS — B18.1 CHRONIC HEPATITIS B VIRUS INFECTION (H): ICD-10-CM

## 2020-11-10 DIAGNOSIS — E11.42 TYPE 2 DIABETES MELLITUS WITH DIABETIC POLYNEUROPATHY, WITH LONG-TERM CURRENT USE OF INSULIN (H): Primary | ICD-10-CM

## 2020-11-10 DIAGNOSIS — Z79.4 TYPE 2 DIABETES MELLITUS WITH DIABETIC POLYNEUROPATHY, WITH LONG-TERM CURRENT USE OF INSULIN (H): Primary | ICD-10-CM

## 2020-11-10 DIAGNOSIS — I10 ESSENTIAL HYPERTENSION: ICD-10-CM

## 2020-11-10 DIAGNOSIS — N18.32 STAGE 3B CHRONIC KIDNEY DISEASE (H): ICD-10-CM

## 2020-11-10 PROBLEM — E66.01 MORBID OBESITY (H): Status: ACTIVE | Noted: 2020-11-10

## 2020-11-10 LAB
ALBUMIN SERPL BCP-MCNC: 3.2 G/DL (ref 3.5–5)
ALP SERPL-CCNC: 112 U/L (ref 45–120)
ALT SERPL W/O P-5'-P-CCNC: 29 U/L (ref 0–45)
AST SERPL-CCNC: 25 U/L (ref 0–40)
BILIRUB DIRECT SERPL-MCNC: 0.1 MG/DL (ref 0–0.5)
BILIRUB SERPL-MCNC: 0.3 MG/DL (ref 0–1)
CREAT UR-MCNC: 53.2 MG/DL
MICROALBUMIN UR-MCNC: 38.76 MG/DL (ref 0–1.99)
MICROALBUMIN/CREAT UR: 728.6 MG/G
PROT SERPL-MCNC: 7 G/DL (ref 6–8)

## 2020-11-10 PROCEDURE — 99214 OFFICE O/P EST MOD 30 MIN: CPT | Performed by: FAMILY MEDICINE

## 2020-11-10 PROCEDURE — 36415 COLL VENOUS BLD VENIPUNCTURE: CPT | Performed by: FAMILY MEDICINE

## 2020-11-10 RX ORDER — AMLODIPINE BESYLATE 2.5 MG/1
TABLET ORAL
Qty: 270 TABLET | Refills: 3 | Status: SHIPPED | OUTPATIENT
Start: 2020-11-10 | End: 2021-03-02

## 2020-11-10 ASSESSMENT — MIFFLIN-ST. JEOR: SCORE: 1198.93

## 2020-11-10 NOTE — PROGRESS NOTES
SUBJECTIVE:            Omid Adorno is a 64 year old female, here alone, in today for:  Chief Complaint   Patient presents with     RECHECK     follow up B/P       1.DM  numbers usually in 100-200s.  Since 1 month changed levemir and now 50 units BID (was 80 units QPM)  -reviewed glucometer and had sugars     Lab Results   Component Value Date    A1C 8.5 10/02/2020    A1C 8.2 06/18/2020    A1C 7.4 01/28/2020    A1C 10.3 10/24/2019    A1C 9.6 06/11/2019         3. HTN: taking BP at home 130-140s systolic and increased amlodipine by 2.5 mg    A Sanera  was used for this visit  ---------------------------------------------------------------------------------------------------------------------  Patient Active Problem List   Diagnosis     Health Care Home     Hepatitis B carrier (H)     Chronic kidney disease, stage III (moderate)     Pure hypercholesterolemia     Essential hypertension     Obesity     Vitamin D deficiency     Major depressive disorder, recurrent episode, moderate (H)     Dyspepsia     Glaucoma of both eyes associated with underlying disease     Other lymphedema     Type 2 diabetes mellitus with diabetic polyneuropathy, with long-term current use of insulin (H)     Chronic hepatitis B virus infection (H)     Inflammatory polyarthropathy (H)     Latent tuberculosis, 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     Past Surgical History:   Procedure Laterality Date     NO HISTORY OF SURGERY         Social History     Tobacco Use     Smoking status: Never Smoker     Smokeless tobacco: Never Used     Tobacco comment: no exposure at home per pt   Substance Use Topics     Alcohol use: No     Alcohol/week: 0.0 standard drinks     Family History   Problem Relation Age of Onset     Cancer No family hx of      Diabetes No family hx of      Cardiovascular No family hx of      Coronary Artery Disease No family hx of      Cerebrovascular Disease No family hx of      Breast Cancer No family hx of      Colon Cancer  "No family hx of      Other Cancer No family hx of      Prostate Cancer No family hx of      Hypertension No family hx of      Asthma No family hx of          ROS:  Denies dyspnea, cough, covid exposure    Problem list, Medication list, Allergies, and Medical/Social/Surgical histories reviewed in [facility] and updated as appropriate.    OBJECTIVE:                          /70   Pulse 62   Temp 98.3  F (36.8  C) (Oral)   Resp 18   Ht 1.42 m (4' 7.91\")   Wt 79.7 kg (175 lb 12.8 oz)   SpO2 98%   BMI 39.55 kg/m     GENERAL: healthy, alert, well nourished, well hydrated, no distress  LUNGS: CTAB, no wheezing, no rales, no crackles, no accessory muscle use  COR: normal rate, regular rhythm and no murmurs, clicks, or gallops  -lower extremities : 1+ at shins        Diagnostic testing:(labs, x-rays, EKG) - None MRI reviewed    ASSESSMENT/PLAN:            (E11.42,  Z79.4) Type 2 diabetes mellitus with diabetic polyneuropathy, with long-term current use of insulin (H)  (primary encounter diagnosis)  Comment: recommend reducing levemir based on low sugars   Plan: Microalbumin Creatinine Ratio Random Ur         ([facility]), insulin detemir (LEVEMIR PEN) 100        UNIT/ML pen        Patient feeling good, thinks low sugars were isolated and diet related.  Gave approval to reduce from 50 BID to 45 BID if low sugars recur    (I10) Essential hypertension  Comment: stable and controlled  Plan: amLODIPine (NORVASC) 2.5 MG tablet        Discussed changing meds to evening instead of taking all meds in AM    (N18.32) Stage 3b chronic kidney disease  Comment: see above  Plan: amLODIPine (NORVASC) 2.5 MG tablet          (Z68.39) Body mass index (BMI) 39.0-39.9, adult  Comment: reviewed not interested at this time in surgery  Plan: encouraged healthy activity as able, low carb diet given weight and DM    (B18.1) Chronic hepatitis B virus infection (H)  Comment: had stopped meds, \"taking too many meds\" and thought it was perhaps " causing problems with her TB exposure  Plan: Hepatitis B DNA Detect and Quant ([facility]),         Hepatic Profile ([facility])        Will recheck and refer back to [facility] if elevated and to consider restarting tenofevir.      Risks, benefits and alternatives of treatments discussed. Plan agreed on.      Will call, return to clinic, or go to ED if worsening or symptoms not improving as discussed.    See patient instructions.     Health Maintenance Due   Topic Date Due     DEXA  1955     DEPRESSION ACTION PLAN  1955     ZOSTER IMMUNIZATION (1 of 2) 08/06/2005     EYE EXAM  10/28/2017     COLORECTAL CANCER SCREENING  07/10/2018     PHQ-9  01/31/2019     MAMMO SCREENING  03/16/2019     MEDICARE ANNUAL WELLNESS VISIT  08/06/2020     FALL RISK ASSESSMENT  08/06/2020     MICROALBUMIN  10/24/2020     Pneumococcal Vaccine: 65+ Years (1 of 1 - PPSV23) 08/06/2020     Health maintenance reviewed/updated? No    Shannan Benson MD  [facility]

## 2020-11-10 NOTE — NURSING NOTE
Due to patient being non-English speaking/uses sign language, an  was used for this visit. Only for face-to-face interpretation by an external agency, date and length of interpretation can be found on the scanned worksheet.     name: Stacie  Agency: [employer]  Language: Rafong   Telephone number: [phone]  Type of interpretation: Telephone, spoken

## 2020-11-13 LAB
HEP B VIRUS DNA QUANT IU/ML: ABNORMAL [IU]/ML
HEP B VIRUS DNA QUANT LOG IU/ML: 5.9 {LOG_IU}/ML

## 2020-11-13 NOTE — RESULT ENCOUNTER NOTE
Call patient:    Her hepatitis B virus is still active and a greater # then checked 2 years previously.  Need her to follow up again with [facility] hepatologist.  [place] record I see with Dr. Mckeon with [facility] 3/2019.  Please assist with contacting [facility] and fax over our records

## 2020-11-16 NOTE — RESULT ENCOUNTER NOTE
Called patient with results.  Informed patient will route to referrals and patient ok to wait for a call back to schedule.     Rowena- please see Dr. Benson's note and fax labs and office note to [facility] when scheduled. Thanks!!    ~ Ezequiel HOOD (Chrissi) [facility]  [facility]  [phone]

## 2020-12-28 DIAGNOSIS — H42 GLAUCOMA OF BOTH EYES ASSOCIATED WITH UNDERLYING DISEASE: ICD-10-CM

## 2020-12-28 DIAGNOSIS — L85.3 DRY SKIN: ICD-10-CM

## 2020-12-28 RX ORDER — LATANOPROST 50 UG/ML
1 SOLUTION/ DROPS OPHTHALMIC DAILY
Qty: 2.5 ML | Refills: 6 | Status: SHIPPED | OUTPATIENT
Start: 2020-12-28 | End: 2021-09-07

## 2020-12-28 RX ORDER — AMMONIUM LACTATE 12 G/100G
CREAM TOPICAL 2 TIMES DAILY
Qty: 140 G | Refills: 3 | Status: SHIPPED | OUTPATIENT
Start: 2020-12-28 | End: 2021-07-02

## 2020-12-28 NOTE — TELEPHONE ENCOUNTER
Message to physician:     Date of last visit: 11/10/2020    Date of next visit if scheduled:   Last Comprehensive Metabolic Panel:  Sodium   Date Value Ref Range Status   12/19/2019 143.0 133.0 - 144.0 mmol/L Final     Potassium   Date Value Ref Range Status   12/19/2019 4.8 3.4 - 5.3 mmol/L Final     Chloride   Date Value Ref Range Status   12/19/2019 108.0 94.0 - 109.0 mmol/L Final     Carbon Dioxide   Date Value Ref Range Status   12/19/2019 27.0 20.0 - 32.0 mmol/L Final     Glucose   Date Value Ref Range Status   12/19/2019 124.0 (H) 60.0 - 109.0 mg/dL Final     Urea Nitrogen   Date Value Ref Range Status   12/19/2019 41.0 (H) 7.0 - 30.0 mg/dL Final     Creatinine   Date Value Ref Range Status   12/19/2019 1.6 (H) 0.6 - 1.3 mg/dL Final     GFR Estimate   Date Value Ref Range Status   07/19/2017 40 (L) >60 mL/min/1.73m2 Final     Calcium   Date Value Ref Range Status   12/19/2019 9.7 8.5 - 10.4 mg/dL Final       BP Readings from Last 3 Encounters:   11/10/20 135/70   10/02/20 (!) 144/72   09/10/20 (!) 166/76       Lab Results   Component Value Date    A1C 8.5 10/02/2020    A1C 8.2 06/18/2020    A1C 7.4 01/28/2020    A1C 10.3 10/24/2019    A1C 9.6 06/11/2019                Please complete refill and CLOSE ENCOUNTER.  Closing the encounter signifies the refill is complete.

## 2021-01-12 ENCOUNTER — TELEPHONE (OUTPATIENT)
Dept: FAMILY MEDICINE | Facility: CLINIC | Age: 66
End: 2021-01-12

## 2021-01-12 NOTE — TELEPHONE ENCOUNTER
[facility] phone call message - order or referral request for patient:     Order or referral being requested: Ultrasound      Additional Comments: Calling because she has not got a call back for an appt to get a ultrasound done due to her high numbers of hep b labs. She states morning works best for her. She states she got a called two months ago with her lab results and was told that someone was going to call and schedule an appt for her but she has not got a call yet. She can't remember the name of the person, told her it was probably Rowena. Did let her know that she is out today so will send a message. She didn't states the name of the clinic but she states she has gone to it before a couple times years before so she knows where it is (notes [facility])? She states it is an ultrasound that she gets done for this. Please call and advise.     OK to leave a message on voice mail?     Primary language: Hmong      needed? Yes    Call taken on January 12, 2021 at 2:11 PM by Sherwin Huff

## 2021-01-13 NOTE — TELEPHONE ENCOUNTER
Faxed notes and labs to location. Called [facility] to schedule patient for an appointment. Asked me if patient had a device to do a virtual visit, I specified that I don't know and may need in clinic visit. She stated that they will have their hmong speaking rep call the patient to schedule her.        [de-identified] : Xray- 3 views of the left knee - well aligned and well fixed left knee replacement. \par   [de-identified] : The patient appears well nourished  and in no apparent distress.  The patient is alert and oriented to person, place, and time.   Affect and mood appear normal. The head is normocephalic and atraumatic.  The eyes reveal normal sclera and extra ocular muscles are intact. The tongue is midline with no apparent lesions.  Skin shows normal turgor with no evidence of eczema or psoriasis.  No respiratory distress noted.  Sensation grossly intact.\par   [de-identified] : Exam of the left knee shows a well healed incision. No instability, -3 to 105 degrees of flexion measured with a goniometer. 5/5 motor strength bilaterally distally. Sensation intact distally.

## 2021-01-14 ENCOUNTER — OFFICE VISIT (OUTPATIENT)
Dept: FAMILY MEDICINE | Facility: CLINIC | Age: 66
End: 2021-01-14
Payer: COMMERCIAL

## 2021-01-14 VITALS
RESPIRATION RATE: 20 BRPM | BODY MASS INDEX: 39.77 KG/M2 | HEIGHT: 56 IN | OXYGEN SATURATION: 98 % | TEMPERATURE: 98 F | DIASTOLIC BLOOD PRESSURE: 67 MMHG | WEIGHT: 176.8 LBS | HEART RATE: 64 BPM | SYSTOLIC BLOOD PRESSURE: 139 MMHG

## 2021-01-14 DIAGNOSIS — E11.42 TYPE 2 DIABETES MELLITUS WITH DIABETIC POLYNEUROPATHY, WITH LONG-TERM CURRENT USE OF INSULIN (H): ICD-10-CM

## 2021-01-14 DIAGNOSIS — Z02.9 ADMINISTRATIVE ENCOUNTER: Primary | ICD-10-CM

## 2021-01-14 DIAGNOSIS — Z79.4 TYPE 2 DIABETES MELLITUS WITH DIABETIC POLYNEUROPATHY, WITH LONG-TERM CURRENT USE OF INSULIN (H): ICD-10-CM

## 2021-01-14 PROCEDURE — 99213 OFFICE O/P EST LOW 20 MIN: CPT | Mod: GC | Performed by: STUDENT IN AN ORGANIZED HEALTH CARE EDUCATION/TRAINING PROGRAM

## 2021-01-14 ASSESSMENT — MIFFLIN-ST. JEOR: SCORE: 1200.34

## 2021-01-14 NOTE — PROGRESS NOTES
Preceptor Attestation:   Patient seen, evaluated and discussed with the resident. I have verified the content of the note, which accurately reflects my assessment of the patient and the plan of care.    Supervising Physician:Manuel Gross MD    [facility]

## 2021-01-14 NOTE — NURSING NOTE
Due to patient being non-English speaking/uses sign language, an  was used for this visit. Only for face-to-face interpretation by an external agency, date and length of interpretation can be found on the scanned worksheet.     name: Miguel Felix  Agency: [employer]  Language: Bobby   Telephone number: [phone]  Type of interpretation: Telephone, spoken

## 2021-01-14 NOTE — PROGRESS NOTES
HPI:       Omid Adorno is a 65 year old female with a significant past medical history of diabetes who presents for the followin. Forms filled out: She presents today to have a form filled out for the DMV due to her history of insulin-dependent diabetes. She has not had any issues with hypoglycemia or altered mental status while driving. She has not had any episodes of loss of consciousness while not driving.     A [employer]  was used for this visit         History:     Patient Active Problem List   Diagnosis     Health Care Home     Hepatitis B carrier (H)     Chronic kidney disease, stage III (moderate)     Pure hypercholesterolemia     Essential hypertension     Obesity     Vitamin D deficiency     Major depressive disorder, recurrent episode, moderate (H)     Dyspepsia     Glaucoma of both eyes associated with underlying disease     Other lymphedema     Type 2 diabetes mellitus with diabetic polyneuropathy, with long-term current use of insulin (H)     Chronic hepatitis B virus infection (H)     Inflammatory polyarthropathy (H)     Latent tuberculosis, possible primary in the ?     Morbid obesity (H)       Current Outpatient Medications   Medication Sig Dispense Refill     acetaminophen (TYLENOL) 325 MG tablet Take 2 tablets (650 mg) by mouth 4 times daily 100 tablet 3     amLODIPine (NORVASC) 2.5 MG tablet Take 2 tablets (5 mg) by mouth every morning AND 1 tablet (2.5 mg) every evening. 270 tablet 3     ammonium lactate (AMLACTIN) 12 % external cream Apply topically 2 times daily 140 g 3     ASPIRIN LOW DOSE 81 MG EC tablet Take 1 tablet (81 mg) by mouth daily 90 tablet 3     atorvastatin (LIPITOR) 40 MG tablet Take 1 tablet (40 mg) by mouth daily 90 tablet 3     blood glucose (ACCU-CHEK CHIKA) test strip Use to test blood sugar 3 times daily or as directed. 100 strip 11     blood glucose (ONETOUCH ULTRA) test strip Use to test blood sugars 2 times daily or as directed. 200 strip 3      blood glucose monitoring (ACCU-CHEK CHIKA PLUS) meter device kit Use to test blood sugar 3 times daily or as directed. 1 kit 0     blood glucose monitoring (ACCU-CHEK MULTICLIX) lancets Use to test blood sugar 3 times daily or as directed. 100 each 11     blood glucose monitoring (ONE TOUCH DELICA) lancets Use to test blood sugars 2 times daily or as directed. 100 each 3     blood glucose monitoring (ONE TOUCH ULTRA MINI) meter device kit Use to test blood sugars 2 times daily or as directed. 1 kit 3     insulin aspart (NOVOLOG PEN) 100 UNIT/ML pen 20 units subcutaneous for  snack and 30 units subcutaneous for meals 45 mL 11     insulin detemir (LEVEMIR PEN) 100 UNIT/ML pen Inject 45 Units Subcutaneous 2 times daily 30 mL 3     insulin pen needle (31G X 8 MM) 31G X 8 MM miscellaneous Use for insulin injections four times daily (with meals and bedtime) 360 each 3     latanoprost (XALATAN) 0.005 % ophthalmic solution Apply 1 drop to eye daily Instill  1 drop into the affected eye(s) once daily as directed 2.5 mL 6     liraglutide (VICTOZA) 18 MG/3ML solution Inject 1.8 mg Subcutaneous daily 27 mL 3     losartan-hydrochlorothiazide (HYZAAR) 100-12.5 MG tablet Take 1 tablet by mouth daily 90 tablet 3     omeprazole (PRILOSEC) 20 MG DR capsule Take 1 capsule (20 mg) by mouth daily 90 capsule 1     order for DME Equipment being ordered: RESPIRATORY MASK  -wear to  daily to prevent infection spread 1 each 0     order for DME Equipment being ordered:     Massage tube, freeze and use to massage heel twice daily 1 Device 0     pioglitazone (ACTOS) 30 MG tablet Take 1 tablet (30 mg) by mouth daily 30 tablet 11     tenofovir alafenamide fumarate (VEMLIDY) 25 MG tablet Take 1 tablet (25 mg) by mouth daily with food (dispense only in the original container). 30 tablet      VITAMIN D3 25 MCG (1000 UT) tablet Take 1 tablet (25 mcg) by mouth daily 90 tablet 3     VITAMIN D3 25 MCG (1000 UT) tablet TAKE 1 TABLET BY MOUTH EVERY  "DAY         Social History     Social History Narrative    Lives with adult children        Children: May (1979), Morgan (1981), Anaid (1981), Juanjo (1983), Jewell (1984), Cristhian (1986), Jeff (1987), and Kash Adorno (1993).           Allergies   Allergen Reactions     Lisinopril Cough       No results found for this or any previous visit (from the past 24 hour(s)).         Review of Systems:     ROS neg other than the symptoms noted above in the HPI.          Physical Exam:     Vitals:    01/14/21 0808   BP: 139/67   Pulse: 64   Resp: 20   Temp: 98  F (36.7  C)   SpO2: 98%   Weight: 80.2 kg (176 lb 12.8 oz)   Height: 1.415 m (4' 7.71\")     Body mass index is 40.05 kg/m .    GENERAL APPEARANCE: healthy, alert and no distress  EYES: Eyes grossly normal to inspection  RESP: Normal work of breathing on room air  SKIN: no suspicious lesions or rashes on exposed skin  NEURO: Normal gait, sensory exam grossly normal, mentation appears intact and speech normal  PSYCH: mood and affect normal/bright           Assessment and Plan:     Patient is a 65 year old female seen for:    1. Administrative encounter  2. Type 2 diabetes mellitus with diabetic polyneuropathy, with long-term current use of insulin (H)  Patient has not had any episodes of hypoglycemia or loss of consciousness that would limit her ability to safely operate a motor vehicle. Form filled out for the [place] DMV. Patient denied any other concerns to be addressed today.    Options for treatment and follow-up care were reviewed with the patient and/or guardian. Omid Adorno and/or guardian engaged in the decision making process and verbalized understanding of the options discussed and agreed with the final plan.      Rhea Ramos MD  [facility] Resident      Precepted with: Manuel Gross MD      "

## 2021-01-22 DIAGNOSIS — E11.42 TYPE 2 DIABETES MELLITUS WITH DIABETIC POLYNEUROPATHY, WITH LONG-TERM CURRENT USE OF INSULIN (H): ICD-10-CM

## 2021-01-22 DIAGNOSIS — Z79.4 TYPE 2 DIABETES MELLITUS WITH DIABETIC POLYNEUROPATHY, WITH LONG-TERM CURRENT USE OF INSULIN (H): ICD-10-CM

## 2021-01-26 ENCOUNTER — TELEPHONE (OUTPATIENT)
Dept: FAMILY MEDICINE | Facility: CLINIC | Age: 66
End: 2021-01-26

## 2021-01-28 ENCOUNTER — TRANSFERRED RECORDS (OUTPATIENT)
Dept: HEALTH INFORMATION MANAGEMENT | Facility: CLINIC | Age: 66
End: 2021-01-28

## 2021-01-29 ENCOUNTER — RECORDS - HEALTHEAST (OUTPATIENT)
Dept: ADMINISTRATIVE | Facility: OTHER | Age: 66
End: 2021-01-29

## 2021-02-03 ENCOUNTER — HOSPITAL ENCOUNTER (OUTPATIENT)
Dept: ULTRASOUND IMAGING | Facility: HOSPITAL | Age: 66
Discharge: HOME | End: 2021-02-03
Attending: INTERNAL MEDICINE

## 2021-02-03 DIAGNOSIS — Z79.4 LONG TERM (CURRENT) USE OF INSULIN (H): ICD-10-CM

## 2021-02-03 DIAGNOSIS — E11.69 TYPE 2 DIABETES MELLITUS WITH OTHER SPECIFIED COMPLICATION, WITH LONG-TERM CURRENT USE OF INSULIN (H): ICD-10-CM

## 2021-02-03 DIAGNOSIS — B18.1 CHRONIC VIRAL HEPATITIS B WITHOUT DELTA AGENT AND WITHOUT COMA (H): ICD-10-CM

## 2021-02-03 DIAGNOSIS — Z79.4 TYPE 2 DIABETES MELLITUS WITH OTHER SPECIFIED COMPLICATION, WITH LONG-TERM CURRENT USE OF INSULIN (H): ICD-10-CM

## 2021-02-03 DIAGNOSIS — K74.00 HEPATIC FIBROSIS: ICD-10-CM

## 2021-02-04 ENCOUNTER — TRANSFERRED RECORDS (OUTPATIENT)
Dept: HEALTH INFORMATION MANAGEMENT | Facility: CLINIC | Age: 66
End: 2021-02-04

## 2021-02-05 ENCOUNTER — TRANSFERRED RECORDS (OUTPATIENT)
Dept: HEALTH INFORMATION MANAGEMENT | Facility: CLINIC | Age: 66
End: 2021-02-05

## 2021-03-02 ENCOUNTER — OFFICE VISIT (OUTPATIENT)
Dept: FAMILY MEDICINE | Facility: CLINIC | Age: 66
End: 2021-03-02
Payer: COMMERCIAL

## 2021-03-02 VITALS
OXYGEN SATURATION: 99 % | SYSTOLIC BLOOD PRESSURE: 160 MMHG | DIASTOLIC BLOOD PRESSURE: 73 MMHG | HEART RATE: 65 BPM | BODY MASS INDEX: 40.13 KG/M2 | TEMPERATURE: 98 F | WEIGHT: 178.4 LBS | HEIGHT: 56 IN

## 2021-03-02 DIAGNOSIS — E11.42 TYPE 2 DIABETES MELLITUS WITH DIABETIC POLYNEUROPATHY, WITH LONG-TERM CURRENT USE OF INSULIN (H): Primary | ICD-10-CM

## 2021-03-02 DIAGNOSIS — Z79.4 TYPE 2 DIABETES MELLITUS WITH DIABETIC POLYNEUROPATHY, WITH LONG-TERM CURRENT USE OF INSULIN (H): Primary | ICD-10-CM

## 2021-03-02 DIAGNOSIS — I10 ESSENTIAL HYPERTENSION: ICD-10-CM

## 2021-03-02 DIAGNOSIS — E66.813 CLASS 3 SEVERE OBESITY WITH SERIOUS COMORBIDITY AND BODY MASS INDEX (BMI) OF 40.0 TO 44.9 IN ADULT, UNSPECIFIED OBESITY TYPE (H): ICD-10-CM

## 2021-03-02 DIAGNOSIS — N18.32 STAGE 3B CHRONIC KIDNEY DISEASE (H): ICD-10-CM

## 2021-03-02 DIAGNOSIS — B18.1 CHRONIC HEPATITIS B VIRUS INFECTION (H): ICD-10-CM

## 2021-03-02 DIAGNOSIS — M06.4 INFLAMMATORY POLYARTHROPATHY (H): ICD-10-CM

## 2021-03-02 DIAGNOSIS — E66.01 CLASS 3 SEVERE OBESITY WITH SERIOUS COMORBIDITY AND BODY MASS INDEX (BMI) OF 40.0 TO 44.9 IN ADULT, UNSPECIFIED OBESITY TYPE (H): ICD-10-CM

## 2021-03-02 LAB
BUN SERPL-MCNC: 43 MG/DL (ref 7–30)
CALCIUM SERPL-MCNC: 9.5 MG/DL (ref 8.5–10.4)
CHLORIDE SERPLBLD-SCNC: 109 MMOL/L (ref 94–109)
CO2 SERPL-SCNC: 23 MMOL/L (ref 20–32)
CREAT SERPL-MCNC: 1.5 MG/DL (ref 0.6–1.3)
EGFR CALCULATED (BLACK REFERENCE): 44.8 ML/MIN
EGFR CALCULATED (NON BLACK REFERENCE): 37 ML/MIN
GLUCOSE SERPL-MCNC: 193 MG/DL (ref 60–109)
HBA1C MFR BLD: 7.3 % (ref 4.1–5.7)
POTASSIUM SERPL-SCNC: 4.6 MMOL/L (ref 3.4–5.3)
SODIUM SERPL-SCNC: 141 MMOL/L (ref 133–144)

## 2021-03-02 PROCEDURE — 83036 HEMOGLOBIN GLYCOSYLATED A1C: CPT | Performed by: FAMILY MEDICINE

## 2021-03-02 PROCEDURE — 36415 COLL VENOUS BLD VENIPUNCTURE: CPT | Performed by: FAMILY MEDICINE

## 2021-03-02 PROCEDURE — 80048 BASIC METABOLIC PNL TOTAL CA: CPT | Performed by: FAMILY MEDICINE

## 2021-03-02 PROCEDURE — 99214 OFFICE O/P EST MOD 30 MIN: CPT | Performed by: FAMILY MEDICINE

## 2021-03-02 RX ORDER — AMLODIPINE BESYLATE 10 MG/1
10 TABLET ORAL EVERY MORNING
Qty: 30 TABLET | Refills: 3 | Status: SHIPPED | OUTPATIENT
Start: 2021-03-02 | End: 2021-06-03

## 2021-03-02 ASSESSMENT — MIFFLIN-ST. JEOR: SCORE: 1205.73

## 2021-03-02 ASSESSMENT — PATIENT HEALTH QUESTIONNAIRE - PHQ9: SUM OF ALL RESPONSES TO PHQ QUESTIONS 1-9: 9

## 2021-03-02 NOTE — NURSING NOTE
Due to patient being non-English speaking/uses sign language, an  was used for this visit. Only for face-to-face interpretation by an external agency, date and length of interpretation can be found on the scanned worksheet.     name: delaney  Agency: [employer]  Language: Hmong   Telephone number:   Type of interpretation: Telephone, spoken      mammo- declined   Colon cancer screen- declined until covid dies down

## 2021-03-02 NOTE — PROGRESS NOTES
ASSESSMENT/PLAN:          (E11.42,  Z79.4) Type 2 diabetes mellitus with diabetic polyneuropathy, with long-term current use of insulin (H)  (primary encounter diagnosis)  Comment: controlled, but struggling with insulin injections  Plan: Hemoglobin A1c ([facility]), insulin detemir         (LEVEMIR PEN) 100 UNIT/ML pen        Might be interested in insulin pump, discussed options to reduce injections  Change levimir 80 units at HS.  Discussed rotating injection sites more, showed diagrams.  Discussed not eating rice or noodles with a meal, patient only eating brown rice, may try to stop noodles    (N18.32) Stage 3b chronic kidney disease  Comment: stable, will limit oral agents  Plan: Basic Metabolic Panel ([facility]) - Results < 1         hr, amLODIPine (NORVASC) 10 MG tablet            (I10) Essential hypertension  Comment: uncontrolled, follow up in 2 weeks, will increase norvasc  Plan: amLODIPine (NORVASC) 10 MG tablet            Risks, benefits and alternatives of treatments discussed. Plan agreed on.      Will call, return to clinic, or go to ED if worsening or symptoms not improving as discussed.    See patient instructions.   SUBJECTIVE:            Omid Adorno is a 64 year old female, here alone, in today for:  Chief Complaint   Patient presents with     Diabetes     labs      Medication Reconciliation     completed - brought in most medications       1.DM  recommend reducing levemir based on low sugars  -wants to stop using mealtime novolog: causing too much irritation to the stomach, was using three times/day and has stomach bruising    Checking 1/day or once every couple of days  Levemir is 50 units BID still  Victoza 1.8 daily  novolog was using 24 unit(s) twice daily with meals, and with bigger snacks/fruit using 24 units  GFR 34 previous    Wt Readings from Last 5 Encounters:   03/02/21 80.9 kg (178 lb 6.4 oz)   01/14/21 80.2 kg (176 lb 12.8 oz)   11/10/20 79.7 kg (175 lb 12.8 oz)   09/10/20 77.6 kg (171  lb)   08/06/20 78 kg (172 lb)         Lab Results   Component Value Date    A1C 8.5 10/02/2020    A1C 8.2 06/18/2020    A1C 7.4 01/28/2020    A1C 10.3 10/24/2019    A1C 9.6 06/11/2019         3. HTN:   Discussed changing meds to evening instead of taking all meds in AM    Covid vaccinated 2/20/2021 #1: only sore at sight    A [employer]  was used for this visit  ---------------------------------------------------------------------------------------------------------------------  Patient Active Problem List   Diagnosis     Health Care Home     Hepatitis B carrier (H)     Chronic kidney disease, stage III (moderate)     Pure hypercholesterolemia     Essential hypertension     Obesity     Vitamin D deficiency     Major depressive disorder, recurrent episode, moderate (H)     Dyspepsia     Glaucoma of both eyes associated with underlying disease     Other lymphedema     Type 2 diabetes mellitus with diabetic polyneuropathy, with long-term current use of insulin (H)     Chronic hepatitis B virus infection (H)     Inflammatory polyarthropathy (H)     Latent tuberculosis, 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     Morbid obesity (H)     Past Surgical History:   Procedure Laterality Date     NO HISTORY OF SURGERY         Social History     Tobacco Use     Smoking status: Never Smoker     Smokeless tobacco: Never Used     Tobacco comment: no exposure at home per pt   Substance Use Topics     Alcohol use: No     Alcohol/week: 0.0 standard drinks     Family History   Problem Relation Age of Onset     Cancer No family hx of      Diabetes No family hx of      Cardiovascular No family hx of      Coronary Artery Disease No family hx of      Cerebrovascular Disease No family hx of      Breast Cancer No family hx of      Colon Cancer No family hx of      Other Cancer No family hx of      Prostate Cancer No family hx of      Hypertension No family hx of      Asthma No family hx of          ROS:  Denies dyspnea, cough, covid  "exposure    Problem list, Medication list, Allergies, and Medical/Social/Surgical histories reviewed in EPIC and updated as appropriate.    OBJECTIVE:                          BP (!) 166/65   Pulse 65   Temp 98  F (36.7  C) (Oral)   Ht 1.412 m (4' 7.59\")   Wt 80.9 kg (178 lb 6.4 oz)   SpO2 99%   BMI 40.59 kg/m     GENERAL: healthy, alert, well nourished, well hydrated, no distress  LUNGS: CTAB, no wheezing, no rales, no crackles, no accessory muscle use  COR: normal rate, regular rhythm and no murmurs, clicks, or gallops  -lower extremities : 1+ at shins        Diagnostic testing:(labs, x-rays, EKG) - None MRI reviewed      Health Maintenance Due   Topic Date Due     DEXA  1955     DEPRESSION ACTION PLAN  1955     ZOSTER IMMUNIZATION (1 of 2) 08/06/2005     EYE EXAM  10/28/2017     COLORECTAL CANCER SCREENING  07/10/2018     PHQ-9  01/31/2019     MAMMO SCREENING  03/16/2019     MEDICARE ANNUAL WELLNESS VISIT  08/06/2020     FALL RISK ASSESSMENT  08/06/2020     Pneumococcal Vaccine: Pediatrics (0 to 5 Years) and At-Risk Patients (6 to 64 Years) (2 of 2) 08/06/2020     Pneumococcal Vaccine: 65+ Years (2 of 2) 08/06/2020     BMP  12/19/2020     A1C  01/02/2021     Health maintenance reviewed/updated? No    Shannan Benson MD  [facility]          "

## 2021-03-04 NOTE — PROGRESS NOTES
DME (Durable Medical Equipment) Orders and Documentation  Orders Placed This Encounter   Procedures     Walker Order      The patient was assessed and it was determined the patient is in need of the following listed DME Supplies/Equipment. Please complete supporting documentation below to demonstrate medical necessity.      DME All Other Item(s) Documentation    List reason for need and supporting documentation for medical necessity below for each DME item.     1. Patient with arthritis and obesity and greater ambulation and greater safety for patient with wheeled walker with seat.

## 2021-03-18 ENCOUNTER — OFFICE VISIT (OUTPATIENT)
Dept: FAMILY MEDICINE | Facility: CLINIC | Age: 66
End: 2021-03-18
Payer: COMMERCIAL

## 2021-03-18 VITALS
RESPIRATION RATE: 22 BRPM | SYSTOLIC BLOOD PRESSURE: 133 MMHG | TEMPERATURE: 98 F | HEART RATE: 65 BPM | OXYGEN SATURATION: 99 % | DIASTOLIC BLOOD PRESSURE: 72 MMHG

## 2021-03-18 DIAGNOSIS — Z79.4 TYPE 2 DIABETES MELLITUS WITH DIABETIC POLYNEUROPATHY, WITH LONG-TERM CURRENT USE OF INSULIN (H): ICD-10-CM

## 2021-03-18 DIAGNOSIS — E11.42 TYPE 2 DIABETES MELLITUS WITH DIABETIC POLYNEUROPATHY, WITH LONG-TERM CURRENT USE OF INSULIN (H): ICD-10-CM

## 2021-03-18 DIAGNOSIS — I10 ESSENTIAL HYPERTENSION: Primary | ICD-10-CM

## 2021-03-18 PROCEDURE — 99213 OFFICE O/P EST LOW 20 MIN: CPT | Performed by: FAMILY MEDICINE

## 2021-03-18 NOTE — PROGRESS NOTES
Due to patient being non-English speaking/uses sign language, an  was used for this visit. Only for face-to-face interpretation by an external agency, date and length of interpretation can be found on the scanned worksheet.     name: Donte  Agency: [employer]  Language: Hmong   Telephone number: [phone]  Type of interpretation: Telephone, spoken     SANDY Payne

## 2021-03-18 NOTE — PROGRESS NOTES
ASSESSMENT/PLAN:            Risks, benefits and alternatives of treatments discussed. Plan agreed on.      Will call, return to clinic, or go to ED if worsening or symptoms not improving as discussed.    See patient instructions.   SUBJECTIVE:            Omid Adorno is a 64 year old female, here alone, in today for:  Chief Complaint   Patient presents with     RECHECK     f/u BP     Medication Reconciliation     Needs attention       1.DM  At last visit 2 weeks previous:  Change levimir 80 units at HS.  Discussed rotating injection sites more, showed diagrams.  Discussed not eating rice or noodles with a meal, patient only eating brown rice, may try to stop noodles  Checking 1/day or once every couple of days  Afternoons 130s, and in the morning 80-90s.  Levemir 80 units Q PM  Victoza 1.8 daily  novolog was using 24 unit(s) twice daily with meals, and with bigger snacks/fruit using 24 units  GFR 34 previous    2. HTN:  -taking the new higher dose of the norvasc and not having any issues or side effects  -tolerating, hasn't been using home wrist BP meter so not sure exactly if it is helping      Wt Readings from Last 5 Encounters:   03/02/21 80.9 kg (178 lb 6.4 oz)   01/14/21 80.2 kg (176 lb 12.8 oz)   11/10/20 79.7 kg (175 lb 12.8 oz)   09/10/20 77.6 kg (171 lb)   08/06/20 78 kg (172 lb)         Lab Results   Component Value Date    A1C 8.5 10/02/2020    A1C 8.2 06/18/2020    A1C 7.4 01/28/2020    A1C 10.3 10/24/2019    A1C 9.6 06/11/2019         Covid vaccinated 2/20/2021 #1: only sore at sight    A [employer]  was used for this visit  ---------------------------------------------------------------------------------------------------------------------  Patient Active Problem List   Diagnosis     Health Care Home     Hepatitis B carrier (H)     Chronic kidney disease, stage III (moderate)     Pure hypercholesterolemia     Essential hypertension     Obesity     Vitamin D deficiency     Major depressive  disorder, recurrent episode, moderate (H)     Dyspepsia     Glaucoma of both eyes associated with underlying disease     Other lymphedema     Type 2 diabetes mellitus with diabetic polyneuropathy, with long-term current use of insulin (H)     Chronic hepatitis B virus infection (H)     Inflammatory polyarthropathy (H)     Latent tuberculosis, 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     Morbid obesity (H)     Past Surgical History:   Procedure Laterality Date     NO HISTORY OF SURGERY         Social History     Tobacco Use     Smoking status: Never Smoker     Smokeless tobacco: Never Used     Tobacco comment: no exposure at home per pt   Substance Use Topics     Alcohol use: No     Alcohol/week: 0.0 standard drinks     Family History   Problem Relation Age of Onset     Cancer No family hx of      Diabetes No family hx of      Cardiovascular No family hx of      Coronary Artery Disease No family hx of      Cerebrovascular Disease No family hx of      Breast Cancer No family hx of      Colon Cancer No family hx of      Other Cancer No family hx of      Prostate Cancer No family hx of      Hypertension No family hx of      Asthma No family hx of          ROS:  Denies dyspnea, cough, covid exposure    Problem list, Medication list, Allergies, and Medical/Social/Surgical histories reviewed in EPIC and updated as appropriate.    OBJECTIVE:                          /72 (BP Location: Right arm, Patient Position: Sitting, Cuff Size: Adult Regular)   Pulse 65   Temp 98  F (36.7  C) (Oral)   Resp 22   SpO2 99%    GENERAL: healthy, alert, well nourished, well hydrated, no distress           Health Maintenance Due   Topic Date Due     DEXA  Never done     DEPRESSION ACTION PLAN  Never done     COVID-19 Vaccine (1) Never done     ZOSTER IMMUNIZATION (1 of 2) Never done     EYE EXAM  10/28/2017     COLORECTAL CANCER SCREENING  07/10/2018     MAMMO SCREENING  03/16/2019     MEDICARE ANNUAL WELLNESS VISIT  08/06/2020     FALL RISK  ASSESSMENT  Never done     Pneumococcal Vaccine: Pediatrics (0 to 5 Years) and At-Risk Patients (6 to 64 Years) (2 of 2) 08/06/2020     Pneumococcal Vaccine: 65+ Years (2 of 2) 08/06/2020     Health maintenance reviewed/updated? No    Shannan Benson MD  [facility]

## 2021-03-23 DIAGNOSIS — M05.741 RHEUMATOID ARTHRITIS INVOLVING BOTH HANDS WITH POSITIVE RHEUMATOID FACTOR (H): ICD-10-CM

## 2021-03-23 DIAGNOSIS — M05.742 RHEUMATOID ARTHRITIS INVOLVING BOTH HANDS WITH POSITIVE RHEUMATOID FACTOR (H): ICD-10-CM

## 2021-03-23 RX ORDER — ACETAMINOPHEN 325 MG/1
650 TABLET ORAL 4 TIMES DAILY
Qty: 100 TABLET | Refills: 3 | Status: SHIPPED | OUTPATIENT
Start: 2021-03-23 | End: 2021-07-08

## 2021-04-21 DIAGNOSIS — E11.42 TYPE 2 DIABETES MELLITUS WITH DIABETIC POLYNEUROPATHY, WITH LONG-TERM CURRENT USE OF INSULIN (H): ICD-10-CM

## 2021-04-21 DIAGNOSIS — Z79.4 TYPE 2 DIABETES MELLITUS WITH DIABETIC POLYNEUROPATHY, WITH LONG-TERM CURRENT USE OF INSULIN (H): ICD-10-CM

## 2021-05-17 DIAGNOSIS — E11.42 TYPE 2 DIABETES MELLITUS WITH DIABETIC POLYNEUROPATHY, WITH LONG-TERM CURRENT USE OF INSULIN (H): ICD-10-CM

## 2021-05-17 DIAGNOSIS — Z79.4 TYPE 2 DIABETES MELLITUS WITH DIABETIC POLYNEUROPATHY, WITH LONG-TERM CURRENT USE OF INSULIN (H): ICD-10-CM

## 2021-05-17 NOTE — TELEPHONE ENCOUNTER
Daughter is calling stating she contact the pharmacy and they told her to call the clinic for refill. Told her a refill has been put in waiting for Dr. Benson to respond. Daughter states Patient is running very low on them. Please call and advise.

## 2021-05-25 ENCOUNTER — RECORDS - HEALTHEAST (OUTPATIENT)
Dept: ADMINISTRATIVE | Facility: CLINIC | Age: 66
End: 2021-05-25

## 2021-05-26 ENCOUNTER — RECORDS - HEALTHEAST (OUTPATIENT)
Dept: ADMINISTRATIVE | Facility: CLINIC | Age: 66
End: 2021-05-26

## 2021-05-26 DIAGNOSIS — K21.9 GASTROESOPHAGEAL REFLUX DISEASE WITHOUT ESOPHAGITIS: ICD-10-CM

## 2021-05-28 ENCOUNTER — RECORDS - HEALTHEAST (OUTPATIENT)
Dept: ADMINISTRATIVE | Facility: CLINIC | Age: 66
End: 2021-05-28

## 2021-05-30 ENCOUNTER — RECORDS - HEALTHEAST (OUTPATIENT)
Dept: ADMINISTRATIVE | Facility: CLINIC | Age: 66
End: 2021-05-30

## 2021-06-03 ENCOUNTER — OFFICE VISIT (OUTPATIENT)
Dept: FAMILY MEDICINE | Facility: CLINIC | Age: 66
End: 2021-06-03
Payer: COMMERCIAL

## 2021-06-03 ENCOUNTER — RECORDS - HEALTHEAST (OUTPATIENT)
Dept: ADMINISTRATIVE | Facility: CLINIC | Age: 66
End: 2021-06-03

## 2021-06-03 VITALS
HEART RATE: 67 BPM | RESPIRATION RATE: 16 BRPM | SYSTOLIC BLOOD PRESSURE: 134 MMHG | BODY MASS INDEX: 39.77 KG/M2 | OXYGEN SATURATION: 98 % | WEIGHT: 176.8 LBS | HEIGHT: 56 IN | DIASTOLIC BLOOD PRESSURE: 75 MMHG

## 2021-06-03 DIAGNOSIS — F33.1 MAJOR DEPRESSIVE DISORDER, RECURRENT EPISODE, MODERATE (H): ICD-10-CM

## 2021-06-03 DIAGNOSIS — E11.42 TYPE 2 DIABETES MELLITUS WITH DIABETIC POLYNEUROPATHY, WITH LONG-TERM CURRENT USE OF INSULIN (H): Primary | ICD-10-CM

## 2021-06-03 DIAGNOSIS — I10 ESSENTIAL HYPERTENSION: ICD-10-CM

## 2021-06-03 DIAGNOSIS — Z79.4 TYPE 2 DIABETES MELLITUS WITH DIABETIC POLYNEUROPATHY, WITH LONG-TERM CURRENT USE OF INSULIN (H): Primary | ICD-10-CM

## 2021-06-03 LAB — HBA1C MFR BLD: 6.8 % (ref 4.1–5.7)

## 2021-06-03 PROCEDURE — 99215 OFFICE O/P EST HI 40 MIN: CPT | Performed by: FAMILY MEDICINE

## 2021-06-03 PROCEDURE — 83036 HEMOGLOBIN GLYCOSYLATED A1C: CPT | Performed by: FAMILY MEDICINE

## 2021-06-03 PROCEDURE — 36415 COLL VENOUS BLD VENIPUNCTURE: CPT | Performed by: FAMILY MEDICINE

## 2021-06-03 RX ORDER — PIOGLITAZONEHYDROCHLORIDE 15 MG/1
15 TABLET ORAL DAILY
Qty: 90 TABLET | Refills: 3 | Status: SHIPPED | OUTPATIENT
Start: 2021-06-03 | End: 2021-07-02

## 2021-06-03 RX ORDER — AMLODIPINE BESYLATE 2.5 MG/1
2.5 TABLET ORAL 2 TIMES DAILY
COMMUNITY
Start: 2021-05-17 | End: 2021-07-13

## 2021-06-03 ASSESSMENT — MIFFLIN-ST. JEOR: SCORE: 1197.21

## 2021-06-03 NOTE — NURSING NOTE
Due to patient being non-English speaking/uses sign language, an  was used for this visit. Only for face-to-face interpretation by an external agency, date and length of interpretation can be found on the scanned worksheet.     name: Courtney Wilson  Agency: [employer]  Language: Rafong   Telephone number: [phone]  Type of interpretation: Face-to-face, spoken

## 2021-06-03 NOTE — PROGRESS NOTES
"  E11.42,  Z79.4) Type 2 diabetes mellitus with diabetic polyneuropathy, with long-term current use of insulin (H)  (primary encounter diagnosis)  Comment: well controlled, but now experiencing some lows, perhaps overmedicating  Plan: Hemoglobin A1c ([facility]), insulin detemir         (LEVEMIR PEN) 100 UNIT/ML pen, pioglitazone         (ACTOS) 15 MG tablet        Reduce actos from 30 mg to 15 mg, reduce lantus from 80 units at bedtime to 70 units at bedtime, no specific glucose levels to document  Patient to return to meet with clinic pharmacist for continuous glucose monitoring services. Reason for CGM: adjust insulin meds Patient-specific goal A1c: <8      (F33.1) Major depressive disorder, recurrent episode, moderate (H)  Comment: patient frustrated by weight gain but mood stable  Plan: no changes In meds, long discussion of weight gain, food    (I10) Essential hypertension  Comment: well controlled on slight increase of amlodipine  Plan: no side effects      64 minutes spent on the date of the encounter doing chart review, history and exam, documentation and further activities per the note       BMI:   Estimated body mass index is 40.34 kg/m  as calculated from the following:    Height as of this encounter: 1.41 m (4' 7.51\").    Weight as of this encounter: 80.2 kg (176 lb 12.8 oz).   Weight management plan: Discussed healthy diet and exercise guidelines    MEDICATIONS:   Orders Placed This Encounter   Medications     amLODIPine (NORVASC) 2.5 MG tablet     Sig: TAKE 2 TABLETS BY MOUTH EVERY MORNING AND 1 TABLET EVERY EVENING.     insulin detemir (LEVEMIR PEN) 100 UNIT/ML pen     Sig: Inject 70 Units Subcutaneous At Bedtime     Dispense:  75 mL     Refill:  3     pioglitazone (ACTOS) 15 MG tablet     Sig: Take 1 tablet (15 mg) by mouth daily     Dispense:  90 tablet     Refill:  3     Medications Discontinued During This Encounter   Medication Reason     amLODIPine (NORVASC) 10 MG tablet      pioglitazone (ACTOS) " "30 MG tablet      insulin detemir (LEVEMIR PEN) 100 UNIT/ML pen           - Continue other medications without change    No follow-ups on file.    Shannan Benson MD  [facility] is a 65 year old who presents for the following health issues  accompanied by her :    HPI     1. DM: having concerns: weight gain and possibly low sugars the last 2 weeks  -during pandemic gained weight but felt like ate less  -feels low sugars during the night, sometimes during the day, wonders about insulin and meds      2. BP no issues        Review of Systems         Objective    /75   Pulse 67   Resp 16   Ht 1.41 m (4' 7.51\")   Wt 80.2 kg (176 lb 12.8 oz)   SpO2 98%   BMI 40.34 kg/m    Body mass index is 40.34 kg/m .  Physical Exam   Gen: alert, oriented X 3, no acute distress, no sign of discomfort  LUNGS: CTAB, no wheezing, no rales, no crackles, no accessory muscle use  COR: normal rate, regular rhythm and no murmurs, clicks, or gallops   2  Results from the last 24 hours   Results for orders placed or performed in visit on 06/03/21 (from the past 24 hour(s))   Hemoglobin A1c ([facility])   Result Value Ref Range    Hemoglobin A1C 6.8 (H) 4.1 - 5.7 %                       "

## 2021-06-04 VITALS — HEIGHT: 55 IN | WEIGHT: 155 LBS | BODY MASS INDEX: 35.87 KG/M2

## 2021-06-10 NOTE — PROGRESS NOTES
"[facility] Daily Progress     Patient Name: Omid Adorno  Date: 5/25/2017  Visit #: 2/6 - [facility]  PTA visit #:    Referral Diagnosis: Planar fasciitis   Referring provider: Shannan Benson MD  Visit Diagnosis:     ICD-10-CM    1. Right foot pain M79.671    2. Decreased ROM of ankle M25.673    3. Plantar fasciitis M72.2          Assessment:   Patient returns for first PT follow up, reporting no change in symptoms. Manual therapy was initiated today to include MFR/STM and joint mobilization for R ankle, will assess response at next treatment. Plan to FU in 1 week.    Goal Status:  Pt. will be independent with home exercise program in : 2 weeks  Pt will: be able to walk greater than 30 minutes with 3/10 pain or less within 6 weeks  Pt will: improve LEFS score by 20 points to improve overall foot and ankle function within 6 weeks    Plan / Patient Education:     Continue with initial plan of care.  Progress with home program as tolerated.    Subjective:   \"The same\".  Pt reports no changes in pain, no questions today.  not available for treatment today due to  going to the wrong clinic.     Objective:     Hypertonic gastrocs present proximal > distal, medial>lateral gastroc.   Hypertonic \"fibrous\" tissue present in R achilles tendon.     Antalgic gait with decreased RLE stance time.     Treatment Today     TREATMENT MINUTES COMMENTS   Evaluation     Self-care/ Home management     Manual therapy 25 Rock blade used for R plantar fascia MFR; pt placed in prone with ankle in slight DF.  MFR to R gastroc and achilles tendon with pt in slight DF.     Posterior glide to R TC joint grade III-IV with 5 sec hold and oscillations at end range.    Neuromuscular Re-education     Therapeutic Activity     Therapeutic Exercises     Gait training     Modality__________________                Total 25    Blank areas are intentional and mean the treatment did not include these items.       Tamar " Aakre  5/25/2017

## 2021-06-10 NOTE — PROGRESS NOTES
[facility]   Foot/Ankle Initial Evaluation    Patient Name: Omid Adorno  Date of evaluation: 5/18/2017  Referral Diagnosis: Plantar fasciitis  Referring provider: Shannan Benson MD  Visit Diagnosis:     ICD-10-CM    1. Right foot pain M79.671    2. Decreased ROM of ankle M25.673    3. Plantar fasciitis M72.2        Assessment:       Omid Adorno is a 60 y/o female who presents today with chief c/o right foot pain. Patient initially reports onset of pain in the plantar surface of her right foot in 9/2016 after stepping on a board with nails in her yard. Patient states her pain in the plantar surface of her foot/heel eventually went away, but returned recently. Patient later states having foot pain for many years and does not attribute the pain in her foot to the puncture wound she recently had. She experiences significant foot pain with weight bearing activities including standing, walking, stairs.  She ambulates with an antalgic gait pattern with minimal stance time on the right. Patient has no pain with palpation along her plantar fascia. She does present with decreased right ankle ROM with dorsiflexion, inversion and eversion as well as hypomobility in her talocrural joint and the joints of her foot. Patient would benefit from continued physical therapy to improve ankle joint ROM and flexibility to improve overall function of her right foot.     Goals:  Pt. will be independent with home exercise program in : 2 weeks  Pt will: be able to walk greater than 30 minutes with 3/10 pain or less within 6 weeks  Pt will: improve LEFS score by 20 points to improve overall foot and ankle function within 6 weeks    Barriers to Learning or Achieving Goals:  Non- adherence to the home exercise program.  Chronicity of the problem.  Co-morbidities or other medical factors.  Diabetic  Language barriers.  Age.     Patient's expectations/goals are realistic.       Plan / Patient Instructions:        Plan of Care:    Communication with: Referral Source  Patient Related Instruction: Nature of Condition;Treatment plan and rationale;Self Care instruction;Basis of treatment;Next steps;Expected outcome  Times per Week: 1  Number of Weeks: 6  Number of Visits: 6  Therapeutic Exercise: ROM;Stretching;Strengthening  Neuromuscular Reeducation: balance/proprioception;posture  Manual Therapy: soft tissue mobilization;joint mobilization;myofascial release    Plan for next visit: Assess neural tension. Progress ankle stretching and provide ankle mobilizations.     Subjective:       Social information:   Living Situation:single family home and lives with others    Occupation:not employed    Work Status:NA   Equipment Available:  cane    History of Present Illness:    Omid is a 61 y.o. female who presents to therapy today with complaints of right foot pain. Patient initially reports onset of pain in the plantar surface of her right foot in 2016 after stepping on a board with nails in her yard. Patient states her pain in the plantar surface of her foot/heel eventually went away, but returned recently. She reports seeing a foot doctor who gave her ointment for pain relief as well as inserts for her shoes. She denies numbness and tingling in her feet and uses epsom salts for pain relief.She has significant pain with weight bearing activity including standing, walking, stair climbing. She uses [facility] on L. Later during evaluation, pt states her pain has been present for >20 years.     Pain Ratin  Pain rating at best: 3  Pain rating at worst: 10  Pain description: pain and sharp    Functional limitations are described as occurring with:   balance  performing routine daily activities  standing with weight bearing on right foot  walking for longer than a few minutes    Patient reports benefit from:  rest  , orthotic, ointment provided by doctor  no benefit from  movement or exercise      Objective:      Note: Items left blank indicates the  item was not performed or not indicated at the time of the evaluation.    Patient Outcome Measures :      Lower Extremity Functional Scale (_/80): 21   Scores range from 0-80, where a score of 80 represents maximum function. The minimal clinically important difference is a positive change of 9 points.    Ankle/Foot Examination  1. Right foot pain     2. Decreased ROM of ankle     3. Plantar fasciitis       Involved Side: Right  Posture Observation:      General sitting posture is  normal.  General standing posture is poor.  Assistive Device: SEC  Gait Observation: Antalgic gait pattern with minimal stance time on the right  Hip Clearing: Does not provoke symptoms Limited ER bilaterally, otherwise WFL  Knee Clearing: Does not provoke symptoms Hamstring length is normal bilaterally    Hypomobility is noted in talocrural joint as well as within the midfoot and forefoot. Most hypomobility noted at R > L TC joint.    Foot/Ankle ROM:    Date: 5/18/17     Ankle ROM( ) AROM in degrees AROM in degrees AROM in degrees    Right Left Right Left Right Left   Dorsiflexion, gastroc (10 ) neutral        Dorsiflexion, soleus (20 )         Plantar Flexion (50 )         Inversion (45-60 ) 5        Eversion (15-30 ) 0        Great Toe Extension (70 ) limited        Great Toe Flexion (MTP45 , IP90 )           Foot/Ankle Strength   Date:      Ankle/Foot Strength (/5) MMT MMT MMT    Right Left Right Left Right Left   Dorsiflexion 5 5       Plantarflexion 5 5       Inversion 4 5       Eversion 4 5       Great Toe Extension 5 5         Ligament Tests Right (+/-) Left (+/-) Fracture Tests Right (+/-) Left (+/-)   Anterior Drawer Test   [place] Ankle Rule     Talar Tilt Test   [place] Foot Rule       Impingement Tests   Heel Tap ( Bump ) Test       Impingement sign   Squeeze Test     Impingement Sign cluster  1.ant-lat ankle tenderness  2.ant-lat ankle swelling  3.pain with forced DF and eversion  4.pain with SL squat  5.pain with  activities  6.ankle instability  (?5 = +)   [place] Test     Achilles Tests Right (+/-) Left (+/-) Plantar Fasciitis Tests Right (+/-) Left (+/-)   Jean s Calf Squeeze test    [place] (non-WB ing) for plantar fasciitis -    Arc Sign    [place] (WB ing) for plantar fasciitis -    [place] Test    Other     Other   Other     Other   Other        Palpation: No pain with palpation along plantar fascia in right foot. No tenderness with palpation at calcaneus at Achilles or plantar fascia insertion.  No pain with palpation of medial and lateral malleolus and over dorsum of foot.      Treatment Today     TREATMENT MINUTES COMMENTS   Evaluation 50    Self-care/ Home management     Manual therapy     Neuromuscular Re-education     Therapeutic Activity     Therapeutic Exercises 15 - Educated on HEP, diagnosis and plan for treatment   Gait training     Modality__________________                Total 65    Blank areas are intentional and mean the treatment did not include these items.     PT Evaluation Code: (Please list factors)  Patient History/Comorbidities: See above  Examination: See above  Clinical Presentation: Stable  Clinical Decision Making: Low    Patient History/  Comorbidities Examination  (body structures and functions, activity limitations, and/or participation restrictions) Clinical Presentation Clinical Decision Making (Complexity)   No documented Comorbidities or personal factors 1-2 Elements Stable and/or uncomplicated Low   1-2 documented comorbidities or personal factor 3 Elements Evolving clinical presentation with changing characteristics Moderate   3-4 documented comorbidities or personal factors 4 or more Unstable and unpredictable High     I attest that I attended a portion of today s treatment session or was immediately available for today s treatment session to ensure sound judgement of the Physical Therapy student.  Yony Huber, SPT      Lanette Dong, PT, DPT

## 2021-06-11 NOTE — PROGRESS NOTES
"[facility] Daily Progress     Patient Name: Omid Adorno  Date: 6/8/2017  Visit #: 4/6 - [facility]  PTA visit #:    Referral Diagnosis: Planar fasciitis   Referring provider: Shannan Benson MD  Visit Diagnosis:     ICD-10-CM    1. Right foot pain M79.671    2. Decreased ROM of ankle M25.673    3. Plantar fasciitis M72.2          Assessment:   Patient's pain has improved significantly since evaluation. Her gait mechanics have improved, as she ambulates with non-antalgic pattern today. Pt was able to tolerate heel raises today with minimal pain- she was previously unable to tolerate this. Patient is appropriate for continued PT services.     Goal Status:  Pt. will be independent with home exercise program in : 2 weeks  Pt will: be able to walk greater than 30 minutes with 3/10 pain or less within 6 weeks  Pt will: improve LEFS score by 20 points to improve overall foot and ankle function within 6 weeks    Plan / Patient Education:     Continue with initial plan of care.  Progress with home program as tolerated.    Subjective:   Patient reports her pain has improved since last visit - significantly. She was able to walk around [place] with less pain. She continues to have low intensity pain with standing/walking, but has improved greatly since evaluation.      Objective:   Non-antalgic gait post treatment.    Hypertonic gastrocs present proximal > distal, medial>lateral gastroc. TrP present.   Hypertonic \"fibrous\" tissue present in R achilles tendon.     Treatment Today     TREATMENT MINUTES COMMENTS   Evaluation     Self-care/ Home management     Manual therapy 28 Rock blade used for R plantar fascia MFR; pt placed in prone with ankle in slight DF.  MFR to R gastroc and achilles tendon with pt in slight DF.   TrP release to R gastroc x 45 sec hold each point.     Posterior glide to R TC joint grade III-IV with 5 sec hold and oscillations at end range.    Neuromuscular Re-education     Therapeutic " Activity     Therapeutic Exercises 2 Gastroc stretching x 30 sec x 2 reps B on incline board.   Added heel raises from step x 10 reps x 2 sets- minimal pain with this today.    Gait training     Modality__________________                Total 30    Blank areas are intentional and mean the treatment did not include these items.       Tamar Dong  6/8/2017

## 2021-06-11 NOTE — PROGRESS NOTES
"[facility] Daily Progress     Patient Name: Omid Adorno  Date: 6/1/2017  Visit #: 3/6 - [facility]  PTA visit #:    Referral Diagnosis: Planar fasciitis   Referring provider: Shannan Benson MD  Visit Diagnosis:     ICD-10-CM    1. Right foot pain M79.671    2. Decreased ROM of ankle M25.673    3. Plantar fasciitis M72.2          Assessment:   Patient reports her pain has improved since last visit and she ambulates with less antalgic gait pattern- improved R stance time.   She is progressing towards goals and is appropriate for continued PT services 1x/week.    Goal Status:  Pt. will be independent with home exercise program in : 2 weeks  Pt will: be able to walk greater than 30 minutes with 3/10 pain or less within 6 weeks  Pt will: improve LEFS score by 20 points to improve overall foot and ankle function within 6 weeks    Plan / Patient Education:     Continue with initial plan of care.  Progress with home program as tolerated.    Subjective:   Patient reports her pain has improved since last visit.   She has been using her back scratcher to rub on her foot and she feels this helps.     Objective:   Less antalgic gait with ambulation today. Improved R stance time.    Hypertonic gastrocs present proximal > distal, medial>lateral gastroc. TrP present.   Hypertonic \"fibrous\" tissue present in R achilles tendon.     Treatment Today     TREATMENT MINUTES COMMENTS   Evaluation     Self-care/ Home management     Manual therapy 25 Rock blade used for R plantar fascia MFR; pt placed in prone with ankle in slight DF.  MFR to R gastroc and achilles tendon with pt in slight DF.     Posterior glide to R TC joint grade III-IV with 5 sec hold and oscillations at end range.    Neuromuscular Re-education     Therapeutic Activity     Therapeutic Exercises 2 Gastroc stretching x 30 sec x 2 reps B on incline board   Gait training     Modality__________________                Total 27    Blank areas are intentional and mean " the treatment did not include these items.       Tamar Dong  6/1/2017

## 2021-06-11 NOTE — PROGRESS NOTES
[facility] Discharge Summary  Patient Name: Omid Adorno  Date: 9/19/2017  Referral Diagnosis: Plantar Fasciitis   Referring provider: Shannan Benson MD  Visit Diagnosis:   1. Right foot pain     2. Decreased ROM of ankle     3. Plantar fasciitis       Status:  Pt. will be independent with home exercise program in : 2 weeks;Met  Pt will: be able to walk greater than 30 minutes with 3/10 pain or less within 6 weeks (met)  Pt will: improve LEFS score by 20 points to improve overall foot and ankle function within 6 weeks (met)    Patient was seen for 5 visits from initial evaluation to 6/15/17 with 0 missed appointments.  The patient met goals and has demonstrated understanding of and independence in the home program for self-care, and progression to next steps.  She will initiate contact if questions or concerns arise.    Therapy will be discontinued at this time.  The patient will need a new referral to resume.    Thank you for your referral.  Tamar Dong  9/19/2017  3:02 PM  [facility] Daily Progress     Patient Name: Omid Adorno  Date: 6/15/2017  Visit #: 5/6 - [facility]  PTA visit #:    Referral Diagnosis: Planar fasciitis   Referring provider: Shannan Benson MD  Visit Diagnosis:     ICD-10-CM    1. Right foot pain M79.671    2. Decreased ROM of ankle M25.673    3. Plantar fasciitis M72.2          Assessment:   Patient has been seen for 5 PT visits since evaluation. She has met all of her goals at this time. Pt displays non-antalgic gait pattern and is able to walk up to 60 minutes outdoors with minimal pain. Discussed benefits of exercise for diabetes and to continue walking as tolerated. Pt is appropriate to FU as needed. Suggested closing patient's chart in 30 days for formal d/c if pt does not return.     Goal Status:  Pt. will be independent with home exercise program in : 2 weeks;Met  Pt will: be able to walk greater than 30 minutes with 3/10 pain or less within 6 weeks (met)  Pt  will: improve LEFS score by 20 points to improve overall foot and ankle function within 6 weeks (met)    Plan / Patient Education:     No FU scheduled at this time. Pt to continue HEP independently. Will hold chart open for 30 days before formal d/c.     Subjective:   Patient reports her pain is better than previous session.   She was able to walk around [place] (60 min) with minimal foot pain. She expresses she is very happy with how her foot feels.     Objective:   Non-antalgic gait pre and post treatment.   .     Treatment Today     TREATMENT MINUTES COMMENTS   Evaluation     Self-care/ Home management     Manual therapy 28 Rock blade used for R plantar fascia MFR; pt placed in prone with ankle in slight DF.  MFR to R gastroc and achilles tendon with pt in slight DF.   TrP release to R gastroc x 45 sec hold each point.       Neuromuscular Re-education     Therapeutic Activity     Therapeutic Exercises 2 Gastroc stretching x 30 sec x 2 reps B on incline board.      Gait training     Modality__________________                Total 30    Blank areas are intentional and mean the treatment did not include these items.       Tamar Dong  6/15/2017

## 2021-06-18 DIAGNOSIS — Z79.4 TYPE 2 DIABETES MELLITUS WITH DIABETIC POLYNEUROPATHY, WITH LONG-TERM CURRENT USE OF INSULIN (H): ICD-10-CM

## 2021-06-18 DIAGNOSIS — E11.42 TYPE 2 DIABETES MELLITUS WITH DIABETIC POLYNEUROPATHY, WITH LONG-TERM CURRENT USE OF INSULIN (H): ICD-10-CM

## 2021-06-19 RX ORDER — ASPIRIN 81 MG/1
81 TABLET, COATED ORAL DAILY
Qty: 90 TABLET | Refills: 3 | Status: SHIPPED | OUTPATIENT
Start: 2021-06-19 | End: 2022-05-31

## 2021-06-29 DIAGNOSIS — Z79.4 TYPE 2 DIABETES MELLITUS WITH DIABETIC POLYNEUROPATHY, WITH LONG-TERM CURRENT USE OF INSULIN (H): ICD-10-CM

## 2021-06-29 DIAGNOSIS — E78.00 PURE HYPERCHOLESTEROLEMIA: ICD-10-CM

## 2021-06-29 DIAGNOSIS — E11.42 TYPE 2 DIABETES MELLITUS WITH DIABETIC POLYNEUROPATHY, WITH LONG-TERM CURRENT USE OF INSULIN (H): ICD-10-CM

## 2021-06-29 DIAGNOSIS — I10 ESSENTIAL HYPERTENSION: ICD-10-CM

## 2021-06-30 RX ORDER — LIRAGLUTIDE 6 MG/ML
1.8 INJECTION SUBCUTANEOUS DAILY
Qty: 27 ML | Refills: 3 | Status: SHIPPED | OUTPATIENT
Start: 2021-06-30 | End: 2021-11-11

## 2021-06-30 RX ORDER — LOSARTAN POTASSIUM AND HYDROCHLOROTHIAZIDE 12.5; 1 MG/1; MG/1
1 TABLET ORAL DAILY
Qty: 90 TABLET | Refills: 3 | Status: SHIPPED | OUTPATIENT
Start: 2021-06-30 | End: 2022-05-03

## 2021-06-30 RX ORDER — ATORVASTATIN CALCIUM 40 MG/1
40 TABLET, FILM COATED ORAL DAILY
Qty: 90 TABLET | Refills: 3 | Status: SHIPPED | OUTPATIENT
Start: 2021-06-30 | End: 2022-06-20

## 2021-07-02 DIAGNOSIS — E11.42 TYPE 2 DIABETES MELLITUS WITH DIABETIC POLYNEUROPATHY, WITH LONG-TERM CURRENT USE OF INSULIN (H): ICD-10-CM

## 2021-07-02 DIAGNOSIS — L85.3 DRY SKIN: ICD-10-CM

## 2021-07-02 DIAGNOSIS — Z79.4 TYPE 2 DIABETES MELLITUS WITH DIABETIC POLYNEUROPATHY, WITH LONG-TERM CURRENT USE OF INSULIN (H): ICD-10-CM

## 2021-07-04 RX ORDER — PIOGLITAZONEHYDROCHLORIDE 15 MG/1
15 TABLET ORAL DAILY
Qty: 90 TABLET | Refills: 3 | Status: SHIPPED | OUTPATIENT
Start: 2021-07-04 | End: 2021-09-13

## 2021-07-04 RX ORDER — AMMONIUM LACTATE 12 G/100G
CREAM TOPICAL 2 TIMES DAILY
Qty: 140 G | Refills: 3 | Status: SHIPPED | OUTPATIENT
Start: 2021-07-04 | End: 2022-01-18

## 2021-07-08 DIAGNOSIS — M05.741 RHEUMATOID ARTHRITIS INVOLVING BOTH HANDS WITH POSITIVE RHEUMATOID FACTOR (H): ICD-10-CM

## 2021-07-08 DIAGNOSIS — M05.742 RHEUMATOID ARTHRITIS INVOLVING BOTH HANDS WITH POSITIVE RHEUMATOID FACTOR (H): ICD-10-CM

## 2021-07-08 RX ORDER — ACETAMINOPHEN 325 MG/1
650 TABLET ORAL 4 TIMES DAILY
Qty: 100 TABLET | Refills: 4 | Status: SHIPPED | OUTPATIENT
Start: 2021-07-08 | End: 2022-01-18

## 2021-07-13 ENCOUNTER — OFFICE VISIT (OUTPATIENT)
Dept: FAMILY MEDICINE | Facility: CLINIC | Age: 66
End: 2021-07-13
Payer: COMMERCIAL

## 2021-07-13 ENCOUNTER — RECORDS - HEALTHEAST (OUTPATIENT)
Dept: ADMINISTRATIVE | Facility: CLINIC | Age: 66
End: 2021-07-13

## 2021-07-13 VITALS
HEART RATE: 61 BPM | DIASTOLIC BLOOD PRESSURE: 70 MMHG | BODY MASS INDEX: 39.37 KG/M2 | HEIGHT: 56 IN | RESPIRATION RATE: 18 BRPM | TEMPERATURE: 98.1 F | WEIGHT: 175 LBS | SYSTOLIC BLOOD PRESSURE: 170 MMHG

## 2021-07-13 DIAGNOSIS — Z79.4 TYPE 2 DIABETES MELLITUS WITH DIABETIC POLYNEUROPATHY, WITH LONG-TERM CURRENT USE OF INSULIN (H): Primary | ICD-10-CM

## 2021-07-13 DIAGNOSIS — Z13.820 SCREENING FOR OSTEOPOROSIS: ICD-10-CM

## 2021-07-13 DIAGNOSIS — Z12.11 SPECIAL SCREENING FOR MALIGNANT NEOPLASMS, COLON: ICD-10-CM

## 2021-07-13 DIAGNOSIS — E11.42 TYPE 2 DIABETES MELLITUS WITH DIABETIC POLYNEUROPATHY, WITH LONG-TERM CURRENT USE OF INSULIN (H): Primary | ICD-10-CM

## 2021-07-13 DIAGNOSIS — I10 ESSENTIAL HYPERTENSION: ICD-10-CM

## 2021-07-13 DIAGNOSIS — M06.4 INFLAMMATORY POLYARTHROPATHY (H): ICD-10-CM

## 2021-07-13 PROCEDURE — 99214 OFFICE O/P EST MOD 30 MIN: CPT | Performed by: FAMILY MEDICINE

## 2021-07-13 RX ORDER — AMLODIPINE BESYLATE 2.5 MG/1
2.5 TABLET ORAL 2 TIMES DAILY
Qty: 180 TABLET | Refills: 3 | Status: SHIPPED | OUTPATIENT
Start: 2021-07-13 | End: 2021-09-07

## 2021-07-13 ASSESSMENT — PATIENT HEALTH QUESTIONNAIRE - PHQ9: SUM OF ALL RESPONSES TO PHQ QUESTIONS 1-9: 9

## 2021-07-13 ASSESSMENT — MIFFLIN-ST. JEOR: SCORE: 1189.04

## 2021-07-13 NOTE — PATIENT INSTRUCTIONS
Referral for :     Dexa Scan    LOCATION/PLACE/Provider :    [facility]   DATE & TIME :    Faxed, location prefers to call   PHONE :     [phone] or [phone]  FAX :    [phone]  Appointment made by clinic staff/:    Rowena

## 2021-07-13 NOTE — PROGRESS NOTES
"    Assessment & Plan     (E11.42,  Z79.4) Type 2 diabetes mellitus with diabetic polyneuropathy, with long-term current use of insulin (H)  (primary encounter diagnosis)  Comment: will get labs a1c repeat in September  Plan: Walker Order        Agree with more walking with family outside and this walker should help facilitate    (I10) Essential hypertension  Comment: uncontrolled  Plan: amLODIPine (NORVASC) 2.5 MG tablet        Restart norvasc    (Z12.11) Special screening for malignant neoplasms, colon  Comment: overdue and patient agrees  Plan: Fecal colorectal cancer screen FIT      Denies symptoms    (M06.4) Inflammatory polyarthropathy (H)  Comment: stable condition  Plan: Walker Order                       BMI:   Estimated body mass index is 39.93 kg/m  as calculated from the following:    Height as of this encounter: 1.41 m (4' 7.51\").    Weight as of this encounter: 79.4 kg (175 lb).           No follow-ups on file.    Shannan Benson MD  [facility] Subjective   Omid is a 65 year old who presents for the following health issues     HPI       1. DM: June reduced actos to 15 mg, reduced lantus to 70 units at bedtime.   -feels like the changes were good, less shaky and numbers are good  -checking 1-3 times/day and sometimes 1 hour after meal    2. HTN: not on amlodipine actually stopped taking    3. Needing walker:  Wanting walker with brake and chair, 4 wheels, needing for outside when using it for longer to the park  -has too much hand pain and unable to just use a cane      Review of Systems   Denies cardiorespiratory or GI/ issues        Objective    BP (!) 170/70 (BP Location: Right arm, Patient Position: Sitting, Cuff Size: Adult Large)   Pulse 61   Temp 98.1  F (36.7  C) (Oral)   Resp 18   Ht 1.41 m (4' 7.51\")   Wt 79.4 kg (175 lb)   BMI 39.93 kg/m    Body mass index is 39.93 kg/m .  Physical Exam   Gen: alert, oriented X 3, no acute distress, no sign of " discomfort  Hands: arthritic changes: MCP and PIP joints enlarged, angulated fingers on right                DME (Durable Medical Equipment) Orders and Documentation  Orders Placed This Encounter   Procedures     Walker Order      The patient was assessed and it was determined the patient is in need of the following listed DME Supplies/Equipment. Please complete supporting documentation below to demonstrate medical necessity.      DME All Other Item(s) Documentation    List reason for need and supporting documentation for medical necessity below for each DME item.     1. Patient needing walker for outside activities.  Arthritis and unable to tolerate use of cane due to hand arthritis, needing walker.

## 2021-07-13 NOTE — NURSING NOTE
Due to patient being non-English speaking/uses sign language, an  was used for this visit. Only for face-to-face interpretation by an external agency, date and length of interpretation can be found on the scanned worksheet.     name: stacey tompkins  Agency: [employer]  Language: Bobby   Telephone number:   Type of interpretation: Face-to-face, spoken

## 2021-07-13 NOTE — PROGRESS NOTES
"      64 minutes spent on the date of the encounter doing chart review, history and exam, documentation and further activities per the note       BMI:   Estimated body mass index is 40.34 kg/m  as calculated from the following:    Height as of this encounter: 1.41 m (4' 7.51\").    Weight as of this encounter: 80.2 kg (176 lb 12.8 oz).   Weight management plan: Discussed healthy diet and exercise guidelines    MEDICATIONS:   Orders Placed This Encounter   Medications     amLODIPine (NORVASC) 2.5 MG tablet     Sig: TAKE 2 TABLETS BY MOUTH EVERY MORNING AND 1 TABLET EVERY EVENING.     insulin detemir (LEVEMIR PEN) 100 UNIT/ML pen     Sig: Inject 70 Units Subcutaneous At Bedtime     Dispense:  75 mL     Refill:  3     pioglitazone (ACTOS) 15 MG tablet     Sig: Take 1 tablet (15 mg) by mouth daily     Dispense:  90 tablet     Refill:  3     Medications Discontinued During This Encounter   Medication Reason     amLODIPine (NORVASC) 10 MG tablet      pioglitazone (ACTOS) 30 MG tablet      insulin detemir (LEVEMIR PEN) 100 UNIT/ML pen           - Continue other medications without change    No follow-ups on file.    Shannan Benson MD  [facility]    Cornelia Anne is a 65 year old who presents for the following health issues  accompanied by her :    HPI     1. DM: June reduced actos to 15 mg, reduced lantus to 70 units at bedtime.   -feels like the changes were good, less shaky and numbers are good  -checking 1-3 times/day and sometimes 1 hour after meal    2. HTN: not on amlodipine but actually stopped taking    3. Needing walker:  Wanting walker with brake and chair, 4 wheels, needing for outside when using it for longer to the park  -has too much hand pain and unable to just use a cane      Review of Systems   Denies cardiorespiratory or GI/ issues      Objective    BP (!) 170/70 (BP Location: Right arm, Patient Position: Sitting, Cuff Size: Adult Large)   Pulse 61   " "Temp 98.1  F (36.7  C) (Oral)   Resp 18   Ht 1.41 m (4' 7.51\")   Wt 79.4 kg (175 lb)   BMI 39.93 kg/m        Physical Exam   Gen: alert, oriented X 3, no acute distress, no sign of discomfort  LUNGS: CTAB, no wheezing, no rales, no crackles, no accessory muscle use  COR: normal rate, regular rhythm and no murmurs, clicks, or gallops   2  Results from the last 24 hours   No results found for this or any previous visit (from the past 24 hour(s)).                    "

## 2021-07-21 ENCOUNTER — RECORDS - HEALTHEAST (OUTPATIENT)
Dept: ADMINISTRATIVE | Facility: CLINIC | Age: 66
End: 2021-07-21

## 2021-08-03 ENCOUNTER — OFFICE VISIT (OUTPATIENT)
Dept: FAMILY MEDICINE | Facility: CLINIC | Age: 66
End: 2021-08-03
Payer: COMMERCIAL

## 2021-08-03 VITALS
BODY MASS INDEX: 39.59 KG/M2 | TEMPERATURE: 97.9 F | HEIGHT: 56 IN | OXYGEN SATURATION: 97 % | DIASTOLIC BLOOD PRESSURE: 63 MMHG | WEIGHT: 176 LBS | HEART RATE: 68 BPM | SYSTOLIC BLOOD PRESSURE: 124 MMHG | RESPIRATION RATE: 18 BRPM

## 2021-08-03 DIAGNOSIS — I10 ESSENTIAL HYPERTENSION: Primary | ICD-10-CM

## 2021-08-03 PROCEDURE — 99212 OFFICE O/P EST SF 10 MIN: CPT | Performed by: FAMILY MEDICINE

## 2021-08-03 ASSESSMENT — MIFFLIN-ST. JEOR: SCORE: 1193.58

## 2021-08-03 NOTE — PROGRESS NOTES
"    Assessment & Plan     (I10) Essential hypertension  (primary encounter diagnosis)  Comment: well controlled today  Plan: due for a1c in September     BMI:   Estimated body mass index is 40.16 kg/m  as calculated from the following:    Height as of this encounter: 1.41 m (4' 7.51\").    Weight as of this encounter: 79.8 kg (176 lb).           No follow-ups on file.    Shannan Benson MD  [facility]    Cornelia Anne is a 65 year old who presents for the following health issues     HPI     1. HTN: recheck, back on medication and tolerating      Review of Systems   Denies cardiorespiratory or GI/ issues        Objective    /63   Pulse 68   Temp 97.9  F (36.6  C)   Resp 18   Ht 1.41 m (4' 7.51\")   Wt 79.8 kg (176 lb)   SpO2 97%   BMI 40.16 kg/m    Body mass index is 40.16 kg/m .  Physical Exam   Gen: alert, oriented X 3, no acute distress, no sign of discomfort  COR: normal rate, regular rhythm  -lower extremities : no edema  LUNGS: CTAB, no wheezing, no rales, no crackles, no accessory muscle use  Diabetic Foot Screen:  Any complaints of increased pain or numbness ? No  Is there a foot ulcer now or a history of foot ulcer? No  Does the foot have an abnormal shape? No  Are the nails thick, too long or ingrown? No  Are there any redness or open areas? No         Sensation Testing done at all points on the diagram with monofilament     Right Foot: Sensation Normal at all points  Left Foot: Sensation Normal at all points     Risk Category: 0- No loss of protective sensation  Performed by Shannan Benson MD          "

## 2021-08-05 ENCOUNTER — DOCUMENTATION ONLY (OUTPATIENT)
Dept: FAMILY MEDICINE | Facility: CLINIC | Age: 66
End: 2021-08-05

## 2021-08-18 DIAGNOSIS — Z79.4 TYPE 2 DIABETES MELLITUS WITH DIABETIC POLYNEUROPATHY, WITH LONG-TERM CURRENT USE OF INSULIN (H): ICD-10-CM

## 2021-08-18 DIAGNOSIS — E11.42 TYPE 2 DIABETES MELLITUS WITH DIABETIC POLYNEUROPATHY, WITH LONG-TERM CURRENT USE OF INSULIN (H): ICD-10-CM

## 2021-08-19 NOTE — TELEPHONE ENCOUNTER
Pharmacy needs clarification on test strips sig. Per Dr. Benson, patient needs to test blood sugar TID. Also got refill request for Actos 30 mg. Per Dr. Benson it was change to 15 mg on 7/13/21 visit. Pharmacy informed

## 2021-09-03 NOTE — PROGRESS NOTES
Telephone call to the client's home for initial health risk assessment due to Covid-19 Remote Assessment.  An  was present.    Current situation/living environment  Client does have a CADI  who authorizes services.  His name is Rene Moyer, from [facility].  Client lives with daughter and she assists with finances due to language barrier.     Activities of daily living (ADL)/instrumental activities of daily living (IADL) and functional issues  Client needs help with the following ADL's: dressing, grooming, bathing, eating, bed mobility, transfers and toileting  Client needs help with the following IADL's: shopping, cooking, housekeeping, laundry, managing finances/bills and transportation  Client states she is unable to perform the above due to hand/wrist and  shoulder pain.    Omid has her own teeth and no dental concerns.  She is up to date with vision.  No concerns with hearing.  Omid reports getting her 4WW.    Health concerns for today    Omid reports her health concerns is her wrist/hand pain. She reports she has spoken to her MD about this pain.   She has HTN and DM2 both being managed at this time.  She was reminded that she needs a wellness visit and preventive care.  She reports no ED/hospitalizations.  Her daughter assists with medication management.        Has patient fallen 2 or more times in the last year? No  Has patient fallen with injury in the last year? No    Cognition/mental health  Client is alert and oriented.  No concern with mental health at this time even though she has a depression diagnosis.  She is attending adult day care and this helps with her mood.     STARS/Med Adherence  Client is non-compliant with the following quality measures: Breast cancer screening and Colon cancer screening  Comments: education efforts    Client's Plan of Care consists of:  CADI workers authorizes PCA (5.75) and homemaking hours. She attends adult day care 5 days a week.

## 2021-09-06 NOTE — PROGRESS NOTES
Medication Therapy Management (MTM) Encounter    ASSESSMENT:                            DM: A1c at goal <7%, however concern for hypoglycemia. On appropriate aspirin and statin based on history and estimated 10 year risk.    PLAN:                            Reviewed available CGM devices on the market. Placed Rx to assess coverage.   Pt agreeable today to Professional CGM so we can help understand impact of changes made by Dr. Benson at visit earlier today    Freestyle Debbie Lot Number: [id number]; SN: [id number]; Expiration Date: 12/31/2021    Follow-up: Return in about 1 week (around 9/14/2021) for CGM download.    SUBJECTIVE/OBJECTIVE:                          Omid Adorno is a 66 year old female coming in for an initial visit. She was referred to me from Dr. Benson. A [email or web address]  was used for completion of today's visit.      Reason for visit: Diabetes managment.    Allergies/ADRs: Reviewed in chart  Past Medical History: Reviewed in chart  Social History     Tobacco Use     Smoking status: Never Smoker     Smokeless tobacco: Never Used     Tobacco comment: no exposure at home per pt   Substance Use Topics     Alcohol use: No     Alcohol/week: 0.0 standard drinks     Drug use: No     ^Reviewed today    DM: When introduced to CGM, patient interested in trying. Interested in seeing abotu cost of supplies. Concern for long term SMBG which has damaged her fingers. Able to report out how Dr. Benson has recommended she change her insulin today:     Levemir stop 70 units and now use 65 units  Novolog, had been not giving with late evening meal, instructed to at least give 10 units with a late meal when close to the levemir at bedtime.    The 10-year ASCVD risk score (Edison DC Jr., et al., 2013) is: 15.1%    Values used to calculate the score:      Age: 66 years      Sex: Female      Is Non- : No      Diabetic: Yes      Tobacco smoker: No      Systolic Blood Pressure: 138 mmHg      Is BP  treated: Yes      HDL Cholesterol: 53 mg/dL      Total Cholesterol: 137 mg/dL    Lab Results   Component Value Date    A1C 6.8 06/03/2021    A1C 7.3 03/02/2021    A1C 8.5 10/02/2020    A1C 8.2 06/18/2020    A1C 7.4 01/28/2020     ----------------    I spent 45 minutes with this patient today. All changes were made via collaborative practice agreement with Dr. Benson. A copy of the visit note was provided to the patient's primary care provider.    The patient was given a summary of these recommendations.     Parul Stanley, PharmD, CDCES (previously, CDE)  [facility]  Phone: [phone]  September 17, 2021 at 10:30 AM    For insurance/tracking purposes, MTM Team Documentation:   Medication Therapy Recommendations  Type 2 diabetes mellitus with diabetic polyneuropathy, with long-term current use of insulin (H)    Current Medication: insulin aspart (NOVOLOG PEN) 100 UNIT/ML pen   Rationale: Medication requires monitoring - Needs additional monitoring   Recommendation: Order Lab - After discussion with Dr. Benson, due to concerns for hypoglycemia, professional CGM today   Status: Accepted per Provider

## 2021-09-07 ENCOUNTER — OFFICE VISIT (OUTPATIENT)
Dept: FAMILY MEDICINE | Facility: CLINIC | Age: 66
End: 2021-09-07
Payer: COMMERCIAL

## 2021-09-07 ENCOUNTER — OFFICE VISIT (OUTPATIENT)
Dept: PHARMACY | Facility: CLINIC | Age: 66
End: 2021-09-07
Payer: COMMERCIAL

## 2021-09-07 VITALS
TEMPERATURE: 98 F | SYSTOLIC BLOOD PRESSURE: 124 MMHG | RESPIRATION RATE: 16 BRPM | BODY MASS INDEX: 40.27 KG/M2 | OXYGEN SATURATION: 98 % | WEIGHT: 174 LBS | HEART RATE: 56 BPM | HEIGHT: 55 IN | DIASTOLIC BLOOD PRESSURE: 65 MMHG

## 2021-09-07 VITALS
HEIGHT: 55 IN | WEIGHT: 174 LBS | HEART RATE: 56 BPM | DIASTOLIC BLOOD PRESSURE: 65 MMHG | BODY MASS INDEX: 40.27 KG/M2 | OXYGEN SATURATION: 98 % | TEMPERATURE: 98 F | SYSTOLIC BLOOD PRESSURE: 124 MMHG | RESPIRATION RATE: 16 BRPM

## 2021-09-07 DIAGNOSIS — E11.42 TYPE 2 DIABETES MELLITUS WITH DIABETIC POLYNEUROPATHY, WITH LONG-TERM CURRENT USE OF INSULIN (H): Primary | ICD-10-CM

## 2021-09-07 DIAGNOSIS — Z23 NEED FOR PROPHYLACTIC VACCINATION AND INOCULATION AGAINST INFLUENZA: ICD-10-CM

## 2021-09-07 DIAGNOSIS — H42 GLAUCOMA OF BOTH EYES ASSOCIATED WITH UNDERLYING DISEASE: ICD-10-CM

## 2021-09-07 DIAGNOSIS — Z79.4 TYPE 2 DIABETES MELLITUS WITH DIABETIC POLYNEUROPATHY, WITH LONG-TERM CURRENT USE OF INSULIN (H): Primary | ICD-10-CM

## 2021-09-07 DIAGNOSIS — Z23 FLU VACCINE NEED: ICD-10-CM

## 2021-09-07 DIAGNOSIS — I10 ESSENTIAL HYPERTENSION: ICD-10-CM

## 2021-09-07 LAB — HBA1C MFR BLD: 7 % (ref 0–5.6)

## 2021-09-07 PROCEDURE — 90471 IMMUNIZATION ADMIN: CPT | Performed by: FAMILY MEDICINE

## 2021-09-07 PROCEDURE — 36415 COLL VENOUS BLD VENIPUNCTURE: CPT | Performed by: FAMILY MEDICINE

## 2021-09-07 PROCEDURE — 99607 MTMS BY PHARM ADDL 15 MIN: CPT | Performed by: PHARMACIST

## 2021-09-07 PROCEDURE — 99605 MTMS BY PHARM NP 15 MIN: CPT | Performed by: PHARMACIST

## 2021-09-07 PROCEDURE — 99214 OFFICE O/P EST MOD 30 MIN: CPT | Mod: 25 | Performed by: FAMILY MEDICINE

## 2021-09-07 PROCEDURE — 83036 HEMOGLOBIN GLYCOSYLATED A1C: CPT | Performed by: FAMILY MEDICINE

## 2021-09-07 PROCEDURE — 90662 IIV NO PRSV INCREASED AG IM: CPT | Performed by: FAMILY MEDICINE

## 2021-09-07 RX ORDER — LATANOPROST 50 UG/ML
1 SOLUTION/ DROPS OPHTHALMIC DAILY
Qty: 2.5 ML | Refills: 6 | Status: SHIPPED | OUTPATIENT
Start: 2021-09-07 | End: 2021-10-19

## 2021-09-07 RX ORDER — AMLODIPINE BESYLATE 2.5 MG/1
TABLET ORAL
Qty: 270 TABLET | Refills: 3 | COMMUNITY
Start: 2021-09-07 | End: 2022-05-31

## 2021-09-07 ASSESSMENT — MIFFLIN-ST. JEOR
SCORE: 1175.35
SCORE: 1175.35

## 2021-09-07 NOTE — PATIENT INSTRUCTIONS
Continuous Glucose Monitoring (CGM) Instructions    Care and wearing tips:    Live your life with your normal behaviors.     You may shower and swim while wearing the CGM device    There is no limit if swimming on the surface of a pool or showering    Insulin should be injected at least 3 inches away from the device location    Keep tape over the sensor and CGM device to prevent accidental removal or sensor movement    If the sensor comes out, it may stop working    If the device comes off, place in plastic resealable bag and call clinic    Check the device site 4 times each day to ensure it remains fully connected    If it slightly pulled out, attempt to gently push it back into place    Call the clinic if you have redness, pain, tenderness, or swelling at the device site    Blood sugar testing:     You only need to check your blood sugars when you feel that you are having a low blood sugar!     CGM removal appointment: Wednesday September 15th at 830 am    Please don't hesitate to call with any questions or concerns    Parul Stanley, PharmD, CDE  [facility]  Phone: [phone]  September 7, 2021 at 11:11 AM

## 2021-09-07 NOTE — NURSING NOTE
Due to patient being non-English speaking/uses sign language, an  was used for this visit. Only for face-to-face interpretation by an external agency, date and length of interpretation can be found on the scanned worksheet.     name: Miguel Felix  Agency: [employer]  Language: Bobby   Telephone number: [phone]  Type of interpretation: Face-to-face, spoken

## 2021-09-07 NOTE — NURSING NOTE
Due to patient being non-English speaking/uses sign language, an  was used for this visit. Only for face-to-face interpretation by an external agency, date and length of interpretation can be found on the scanned worksheet.     name: jair  Agency: [employer]  Language: Bobby   Telephone number: [phone]  Type of interpretation: Face-to-face, spoken

## 2021-09-07 NOTE — PROGRESS NOTES
"    Assessment & Plan   (E11.42,  Z79.4) Type 2 diabetes mellitus with diabetic polyneuropathy, with long-term current use of insulin (H)  (primary encounter diagnosis)  Comment: doing well, reviewed with lows one 40 and rare 50 so will reduce levemir  Plan: Hemoglobin A1c, insulin aspart (NOVOLOG PEN)         100 UNIT/ML pen        levemir stop 70 units and now use 65 units   Novolog, had been not giving with late evening meal, instructed to at least give 10 units with a late meal when close to the levemir at bedtime.    (I10) Essential hypertension  Comment: well controlled  Plan: amLODIPine (NORVASC) 2.5 MG tablet        No changes    (H42) Glaucoma of both eyes associated with underlying disease  Comment: goes to optho regularly  Plan: latanoprost (XALATAN) 0.005 % ophthalmic                                   BMI:   Estimated body mass index is 40.08 kg/m  as calculated from the following:    Height as of this encounter: 1.403 m (4' 7.25\").    Weight as of this encounter: 78.9 kg (174 lb).           No follow-ups on file.    Shannan Benson MD  [facility] ISHMAEL Anne is a 65 year old who presents for the following health issues     Chief Complaint   Patient presents with     RECHECK     f/u bp and dm     Medication Reconciliation     Complete       HPI       1. DM: Checking sugars in AM and this AM was 96 30 day [address] of 115  -giving novolog 24 with meals, but gives no insulin if meal is close to bedtime levemir dosing    2. HTN: some issues with taking the larger hyzaar, taking amlodipine 7.5 mg daily    Review of Systems   Denies cardiorespiratory or GI/ issues        Objective    /65   Pulse 56   Temp 98  F (36.7  C) (Oral)   Resp 16   Ht 1.403 m (4' 7.25\")   Wt 78.9 kg (174 lb)   SpO2 98%   BMI 40.08 kg/m    Body mass index is 40.08 kg/m .  Physical Exam   Gen: alert, oriented X 3, no acute distress, no sign of discomfort  LUNGS: CTAB, no wheezing, no " rales, no crackles, no accessory muscle use  COR: no murmurs, clicks, or gallops and slightly bradycardic  -lower extremities : minimal

## 2021-09-13 DIAGNOSIS — E11.42 TYPE 2 DIABETES MELLITUS WITH DIABETIC POLYNEUROPATHY, WITH LONG-TERM CURRENT USE OF INSULIN (H): ICD-10-CM

## 2021-09-13 DIAGNOSIS — Z79.4 TYPE 2 DIABETES MELLITUS WITH DIABETIC POLYNEUROPATHY, WITH LONG-TERM CURRENT USE OF INSULIN (H): ICD-10-CM

## 2021-09-14 RX ORDER — PIOGLITAZONEHYDROCHLORIDE 15 MG/1
15 TABLET ORAL DAILY
Qty: 90 TABLET | Refills: 3 | Status: SHIPPED | OUTPATIENT
Start: 2021-09-14 | End: 2021-10-19

## 2021-09-15 ENCOUNTER — OFFICE VISIT (OUTPATIENT)
Dept: PHARMACY | Facility: CLINIC | Age: 66
End: 2021-09-15
Payer: COMMERCIAL

## 2021-09-15 VITALS
RESPIRATION RATE: 18 BRPM | WEIGHT: 172 LBS | SYSTOLIC BLOOD PRESSURE: 138 MMHG | OXYGEN SATURATION: 100 % | TEMPERATURE: 98 F | BODY MASS INDEX: 39.62 KG/M2 | DIASTOLIC BLOOD PRESSURE: 70 MMHG | HEART RATE: 56 BPM

## 2021-09-15 DIAGNOSIS — Z79.4 TYPE 2 DIABETES MELLITUS WITH DIABETIC POLYNEUROPATHY, WITH LONG-TERM CURRENT USE OF INSULIN (H): Primary | ICD-10-CM

## 2021-09-15 DIAGNOSIS — E11.42 TYPE 2 DIABETES MELLITUS WITH DIABETIC POLYNEUROPATHY, WITH LONG-TERM CURRENT USE OF INSULIN (H): Primary | ICD-10-CM

## 2021-09-15 DIAGNOSIS — B18.1 HEPATITIS B CARRIER (H): ICD-10-CM

## 2021-09-15 PROCEDURE — 99606 MTMS BY PHARM EST 15 MIN: CPT | Performed by: PHARMACIST

## 2021-09-15 PROCEDURE — 99607 MTMS BY PHARM ADDL 15 MIN: CPT | Performed by: PHARMACIST

## 2021-09-15 NOTE — NURSING NOTE
Due to patient being non-English speaking/uses sign language, an  was used for this visit. Only for face-to-face interpretation by an external agency, date and length of interpretation can be found on the scanned worksheet.     name: Linda Gastelum [id number]  Agency: [employer]  Language: Bobby   Telephone number: [phone]  Type of interpretation: Telephone, spoken

## 2021-09-15 NOTE — PROGRESS NOTES
"ASSESSMENT:                             Medication Adherence/Access: No issues identified    Diabetes, type 2: After downloading data from CGM device, resulted in 7 days worth of viable data. Observed trends of asymptomatic low blood sugars after noon-time meal and 1 nocturnal hypoglycemia event all throughout the night. Unclear reason - discussed eating habits and insulin dosing that day. A1C increased slightly, however still near goal of <7% which is excellent. Primary concern at this visit is to address asymptomatic hypoglycemic episodes, therefore we will reduce mealtime insulin by 2 units for a total of 22 units prior to each meal and continue to collect CGM data through the weekend to see if this resolves, plan to remove on Monday. Future state should look into adding SGLT2 inhibitor based on history of albuminuria, this might lessen her insulin needs which could benefit her BMI. Appropriate aspirin based on 10 year risk estimate >10%, on appropriate statin.     Nocturnal glucose control: Hypoglycemia detected   Fasting blood glucose: Controlled  Post-prandial glucose: Hypoglycemia detected    Hepatitis B: Patient due for follow-up with [facility] in August - will refer to liver specialist for management. Encouraged patient to bring up concerns about side effects of Vemlidy.     PLAN:                            - Reports generated from [employer]. Copies to provided to referring provider, patient and subitted to medical records for scanning into the health record (see \"Media\" tab).  - Changes made today: Decrease aspart to 22 units 5 minutes prior to meals. Decrease aspart to 22 units 5 minutes prior to meals.  - Recommendations for future: Remove device Monday 9/20  - message sent to referral coordinator re: GI follow up.     Follow-up: CGM removal on Monday 9/20  ? SGLT2 inhibitor    SUBJECTIVE/OBJECTIVE:                           Omid Adorno is a 66 year old female seen for an Continuous Glucose Monitoring " "assessment.  She was referred to me from Dr. Benson.    Medication Adherence/Access: No issues identified - daughter sets up pill box    Diabetes, type 2: Patient currently takes levemir 65 units at bedtime and 24 units with breakfast and lunch. She takes 24 units with dinner if she eats before 5pm, however will only take 10 units if she eats late. Also taking pioglitazone 15 mg and liraglutide 1.8 mg daily.     Prior to last week's visit - Around midnight or 1am will get very low blood sugars if she does the 24 units for dinner followed by 70 units of levemir, gets sweaty and shaky and cant get up to get something to eat but set juice near her bed and would drink til she is able to get up and then go get something to eat. Lives with her children and she can call them if needed and they will bring her something. Patient reports this stopped after dose reduction of detemir last week. Does not like sensor on her arm, it is itchy and bothersome.          Lab Results   Component Value Date    A1C 7.0 09/07/2021    A1C 6.8 06/03/2021    A1C 7.3 03/02/2021    A1C 8.5 10/02/2020    A1C 8.2 06/18/2020    A1C 7.4 01/28/2020     The 10-year ASCVD risk score (Edison DC Jr., et al., 2013) is: 15.1%    Values used to calculate the score:      Age: 66 years      Sex: Female      Is Non- : No      Diabetic: Yes      Tobacco smoker: No      Systolic Blood Pressure: 138 mmHg      Is BP treated: Yes      HDL Cholesterol: 53 mg/dL      Total Cholesterol: 137 mg/dL      Hepatitis B: Patient reported that she does not take Vemlidy. She is concerned about side effects such as drowsiness. Also she shares that she is concerned because this medicine is not prescribed by Dr. Benson. Takes medicines she prescribes.     Medication Reconciliation: She reports she takes \"7 tablets regularly\" which matches her current medication list including amlodipine (5 mg in AM and 2.5mg in PM), losartan-hydrochlorothiazide, " omeprazole, baby aspirin, pioglitazone, atorvastatin, and vitamin D. Patient reports she is using APAP for hand pain and headaches about 2-3 days per week only when she cannot tolerate the pain.     Today's Vitals: /70 (BP Location: Right arm)   Pulse 56   Temp 98  F (36.7  C) (Oral)   Resp 18   Wt 78 kg (172 lb)   SpO2 100%   BMI 39.62 kg/m    ----------------      I spent 60 minutes with this patient today. All changes were made via collaborative practice agreement with Dr. Benson. A copy of the visit note was provided to the patient's primary care provider.    The patient was given a summary of these recommendations.     Caron Edwards, P4 Pharmacy Student  [facility]    Preceptor Attestation:  I was present with the pharmacy student who participated in the service and in the documentation of this note. I have verified the history, personally performed the medical decision making, and have verified the content of the note, which accurately reflects my assessment of the patient and the plan of care.     Parul Curtis, PharmD, BCACP, CDE  [facility]  Phone: [phone]  September 15, 2021 at 3:52 PM    For insurance/tracking purposes, MTM Team Documentation:   Medication Therapy Recommendations  Type 2 diabetes mellitus with diabetic polyneuropathy, with long-term current use of insulin (H)    Current Medication: insulin aspart (NOVOLOG PEN) 100 UNIT/ML pen   Rationale: Dose too high - Dosage too high - Safety   Recommendation: Decrease Dose - decrease to 22 units daily   Status: Accepted per CPA

## 2021-09-15 NOTE — PATIENT INSTRUCTIONS
Recommendations from today's visit:                                                      1. Decrease your Novolog to 22 units with meals      It was great to speak with you today!    I value your experience. You may receive a survey via email or text message in the next few days. I would be very thankful for your time in providing feedback.    Pharmacist's contact information:                                                      Please feel free to contact me with any questions or concerns you have.     Parul Stanley  Pharmacist, Certified Diabetes Care and   [facility]  Phone: [phone]  September 15, 2021 at 9:20 AM

## 2021-09-20 ENCOUNTER — OFFICE VISIT (OUTPATIENT)
Dept: FAMILY MEDICINE | Facility: CLINIC | Age: 66
End: 2021-09-20
Payer: COMMERCIAL

## 2021-09-20 VITALS
OXYGEN SATURATION: 99 % | HEIGHT: 55 IN | BODY MASS INDEX: 39.81 KG/M2 | SYSTOLIC BLOOD PRESSURE: 131 MMHG | RESPIRATION RATE: 18 BRPM | WEIGHT: 172 LBS | HEART RATE: 58 BPM | DIASTOLIC BLOOD PRESSURE: 69 MMHG | TEMPERATURE: 98.2 F

## 2021-09-20 DIAGNOSIS — Z79.4 TYPE 2 DIABETES MELLITUS WITH DIABETIC POLYNEUROPATHY, WITH LONG-TERM CURRENT USE OF INSULIN (H): Primary | ICD-10-CM

## 2021-09-20 DIAGNOSIS — E11.42 TYPE 2 DIABETES MELLITUS WITH DIABETIC POLYNEUROPATHY, WITH LONG-TERM CURRENT USE OF INSULIN (H): Primary | ICD-10-CM

## 2021-09-20 PROCEDURE — 95250 CONT GLUC MNTR PHYS/QHP EQP: CPT | Performed by: PHARMACIST

## 2021-09-20 PROCEDURE — 99207 PR NO CHARGE LOS: CPT | Performed by: PHARMACIST

## 2021-09-20 ASSESSMENT — MIFFLIN-ST. JEOR: SCORE: 1166.06

## 2021-09-20 NOTE — NURSING NOTE
Due to patient being non-English speaking/uses sign language, an  was used for this visit. Only for face-to-face interpretation by an external agency, date and length of interpretation can be found on the scanned worksheet.     name: Miguel Felix  Agency: [employer]  Language: Bobby   Telephone number: [phone]  Type of interpretation: Face-to-face, spoken

## 2021-09-20 NOTE — PROGRESS NOTES
"ASSESSMENT:                             Medication Adherence/Access:   Diabetes, type 2: After downloading data from CGM device, resulted in 14 days worth of viable data. Concern continues for too high of insulin doses based on fasting, overnight and postprandial BG. Most common hypoglycemic events are postpranidal lows, also mismatch in basal:bolus total daily dose leads to want to decrease meal time insulin to slightly greater degree than basal.     Nocturnal glucose control: Hypoglycemia detected   Fasting blood glucose: Hypoglycemia detected  Post-prandial glucose: Hypoglycemia detected    PLAN:                            - Device removed.   - Reports generated from [employer]. Copies to provided to referring provider, patient and subitted to medical records for scanning into the health record (see \"Media\" tab).  - Changes made today: Decreased Levemir ~10% to 50 units, decrease Novolog TDD ~20% to 16 units. Pt to continue SMBG at least twice per day, pt declines use of personal CGM.   - Recommendations for furure: Need to educate on how to self-adjust insulin?    Follow-up: Return in about 4 weeks (around 10/18/2021). -----> pt declines, would rather see both Dr. Benson and myself November, sent message to  to facilitate    SUBJECTIVE/OBJECTIVE:                           Omid Adorno is a 66 year old female coming in for an Continuous Glucose Monitoring assessment.  She was referred to me from Dr. Benson.     Documentation:                  Due to patient being non-English speaking/uses sign language, an  was used for this visit. Only for face-to-face interpretation by an external agency, date and length of interpretation can be found on the scanned worksheet.     name: Miguel  Agency: [employer]  Language: Bobby   Type of interpretation: Face-to-face, spoken                        Medication Adherence/Access:     Diabetes, type 2: Since last visit continues to have low blood " "sugars. At last visit decreased Novolog to 22 units with meals. Doesn't snack, uses the 10 units instead when has a late dinner meal 7-8 pm. Gives Levemir in the evening, doesn't like to use the full dose of Novolog with the evening insulin. Estimates this is 1-2 times per week. If eats very late dinner 8-9 pm skips Novolog. Levemir dose 56 units nightly, was told to do 65 however couldn't find this on her insulin pen. So used 56 instead. Planning to see Dr. Benson in November again. Would rather just see eachother at this time rather than 1 month, doesn't like missing adult day care. Not able to have telephone visit, can't read English numbers. She notices that summertime requires less insulin than winter time. Constant fluctuation.     Lab Results   Component Value Date    A1C 7.0 09/07/2021    A1C 6.8 06/03/2021    A1C 7.3 03/02/2021    A1C 8.5 10/02/2020    A1C 8.2 06/18/2020    A1C 7.4 01/28/2020              Today's Vitals: /69   Pulse 58   Temp 98.2  F (36.8  C) (Oral)   Resp 18   Ht 4' 7.24\" (1.403 m)   Wt 172 lb (78 kg)   SpO2 99%   BMI 39.64 kg/m    ----------------      I spent 20 minutes with this patient today. All changes were made via collaborative practice agreement with Dr. Benson. A copy of the visit note was provided to the patient's primary care provider.    The patient was given a summary of these recommendations.     Parul Stanley, PharmD, CDCES (previously, CDE)  [facility]  Phone: [phone]  September 20, 2021 at 8:34 AM        "

## 2021-09-20 NOTE — PATIENT INSTRUCTIONS
Recommendations from today's visit:                                                 1,      1. Decrease Levemir to 50 units  2. Decrease Novolog to 16 units  3. To help prevent low blood sugars, work to eat your meals around the same time each day      It was great to speak with you today!    I value your experience. You may receive a survey via email or text message in the next few days. I would be very thankful for your time in providing feedback.    Pharmacist's contact information:                                                      Please feel free to contact me with any questions or concerns you have.     Parul Stanley  Pharmacist, Certified Diabetes Care and   [facility]  Phone: [phone]  September 20, 2021 at 8:34 AM

## 2021-10-19 DIAGNOSIS — Z79.4 TYPE 2 DIABETES MELLITUS WITH DIABETIC POLYNEUROPATHY, WITH LONG-TERM CURRENT USE OF INSULIN (H): ICD-10-CM

## 2021-10-19 DIAGNOSIS — E11.42 TYPE 2 DIABETES MELLITUS WITH DIABETIC POLYNEUROPATHY, WITH LONG-TERM CURRENT USE OF INSULIN (H): ICD-10-CM

## 2021-10-19 DIAGNOSIS — E55.9 VITAMIN D DEFICIENCY: Primary | ICD-10-CM

## 2021-10-19 DIAGNOSIS — H42 GLAUCOMA OF BOTH EYES ASSOCIATED WITH UNDERLYING DISEASE: ICD-10-CM

## 2021-10-19 RX ORDER — CHOLECALCIFEROL (VITAMIN D3) 25 MCG
1 TABLET ORAL DAILY
Qty: 90 TABLET | Refills: 3 | Status: SHIPPED | OUTPATIENT
Start: 2021-10-19 | End: 2022-09-20

## 2021-10-19 RX ORDER — PIOGLITAZONEHYDROCHLORIDE 15 MG/1
15 TABLET ORAL DAILY
Qty: 90 TABLET | Refills: 3 | Status: SHIPPED | OUTPATIENT
Start: 2021-10-19 | End: 2022-11-14

## 2021-10-19 RX ORDER — LATANOPROST 50 UG/ML
1 SOLUTION/ DROPS OPHTHALMIC DAILY
Qty: 2.5 ML | Refills: 3 | Status: SHIPPED | OUTPATIENT
Start: 2021-10-19 | End: 2022-05-03

## 2021-11-09 ENCOUNTER — TELEPHONE (OUTPATIENT)
Dept: FAMILY MEDICINE | Facility: CLINIC | Age: 66
End: 2021-11-09
Payer: COMMERCIAL

## 2021-11-09 DIAGNOSIS — Z79.4 TYPE 2 DIABETES MELLITUS WITH DIABETIC POLYNEUROPATHY, WITH LONG-TERM CURRENT USE OF INSULIN (H): ICD-10-CM

## 2021-11-09 DIAGNOSIS — E11.42 TYPE 2 DIABETES MELLITUS WITH DIABETIC POLYNEUROPATHY, WITH LONG-TERM CURRENT USE OF INSULIN (H): ICD-10-CM

## 2021-11-09 NOTE — ADDENDUM NOTE
Addended by: YUMIKO PEREZ on: 11/9/2021 09:33 AM     Modules accepted: Orders     No recheck required for UTI.  But BP is too high.  Please confirm current medication regimen.  Curtis Fuchs MD 3/6/2018 2:01 PM

## 2021-11-09 NOTE — TELEPHONE ENCOUNTER
[facility] phone call message- patient requesting a refill:    Full Medication Name: Insulin needle    Dose: n/a    Pharmacy confirmed as   [id number] - [place], [place] - [id number] [place] [address]  Phone: [phone] Fax: [phone]  : Yes    Additional Comments: Per PT is out of insulin needles. Pt have been out of needles for 3 days.  Per Pt would like a few refill, please and thank you.     OK to leave a message on voice mail? Yes    Primary language: Hmong      needed? Yes    Call taken on November 9, 2021 at 8:07 AM by Nani Felix

## 2021-11-09 NOTE — TELEPHONE ENCOUNTER
Message to physician:     Date of last visit: 9/20/2021     Date of next visit if scheduled:     Last Comprehensive Metabolic Panel:  Sodium   Date Value Ref Range Status   03/02/2021 141.0 133.0 - 144.0 mmol/L Final     Potassium   Date Value Ref Range Status   03/02/2021 4.6 3.4 - 5.3 mmol/L Final     Chloride   Date Value Ref Range Status   03/02/2021 109.0 94.0 - 109.0 mmol/L Final     Carbon Dioxide   Date Value Ref Range Status   03/02/2021 23.0 20.0 - 32.0 mmol/L Final     Glucose   Date Value Ref Range Status   03/02/2021 193.0 (H) 60.0 - 109.0 mg/dL Final     Urea Nitrogen   Date Value Ref Range Status   03/02/2021 43.0 (H) 7.0 - 30.0 mg/dL Final     Creatinine   Date Value Ref Range Status   03/02/2021 1.5 (H) 0.6 - 1.3 mg/dL Final     GFR Estimate   Date Value Ref Range Status   07/19/2017 40 (L) >60 mL/min/1.73m2 Final     Calcium   Date Value Ref Range Status   03/02/2021 9.5 8.5 - 10.4 mg/dL Final       BP Readings from Last 3 Encounters:   09/20/21 131/69   09/15/21 138/70   09/07/21 124/65       Hemoglobin A1C   Date Value Ref Range Status   09/07/2021 7.0 (H) 0.0 - 5.6 % Final     Comment:     Normal <5.7%   Prediabetes 5.7-6.4%    Diabetes 6.5% or higher     Note: Adopted from ADA consensus guidelines.   06/03/2021 6.8 (H) 4.1 - 5.7 % Final       Please complete refill and CLOSE ENCOUNTER.  Closing the encounter signifies the refill is complete.

## 2021-11-11 ENCOUNTER — OFFICE VISIT (OUTPATIENT)
Dept: FAMILY MEDICINE | Facility: CLINIC | Age: 66
End: 2021-11-11
Payer: COMMERCIAL

## 2021-11-11 ENCOUNTER — OFFICE VISIT (OUTPATIENT)
Dept: PHARMACY | Facility: CLINIC | Age: 66
End: 2021-11-11
Payer: COMMERCIAL

## 2021-11-11 VITALS
HEART RATE: 58 BPM | BODY MASS INDEX: 38.94 KG/M2 | DIASTOLIC BLOOD PRESSURE: 67 MMHG | WEIGHT: 169 LBS | TEMPERATURE: 98.1 F | SYSTOLIC BLOOD PRESSURE: 136 MMHG | OXYGEN SATURATION: 98 % | RESPIRATION RATE: 18 BRPM

## 2021-11-11 DIAGNOSIS — E11.42 TYPE 2 DIABETES MELLITUS WITH DIABETIC POLYNEUROPATHY, WITH LONG-TERM CURRENT USE OF INSULIN (H): ICD-10-CM

## 2021-11-11 DIAGNOSIS — Z79.4 TYPE 2 DIABETES MELLITUS WITH DIABETIC POLYNEUROPATHY, WITH LONG-TERM CURRENT USE OF INSULIN (H): ICD-10-CM

## 2021-11-11 DIAGNOSIS — Z79.4 TYPE 2 DIABETES MELLITUS WITH DIABETIC POLYNEUROPATHY, WITH LONG-TERM CURRENT USE OF INSULIN (H): Primary | ICD-10-CM

## 2021-11-11 DIAGNOSIS — R80.9 MICROALBUMINURIA: ICD-10-CM

## 2021-11-11 DIAGNOSIS — I10 ESSENTIAL HYPERTENSION: ICD-10-CM

## 2021-11-11 DIAGNOSIS — N18.32 STAGE 3B CHRONIC KIDNEY DISEASE (H): ICD-10-CM

## 2021-11-11 DIAGNOSIS — E11.42 TYPE 2 DIABETES MELLITUS WITH DIABETIC POLYNEUROPATHY, WITH LONG-TERM CURRENT USE OF INSULIN (H): Primary | ICD-10-CM

## 2021-11-11 PROCEDURE — 99606 MTMS BY PHARM EST 15 MIN: CPT | Performed by: PHARMACIST

## 2021-11-11 PROCEDURE — 91306 COVID-19,PF,MODERNA (18+ YRS BOOSTER .25ML): CPT | Performed by: FAMILY MEDICINE

## 2021-11-11 PROCEDURE — 99607 MTMS BY PHARM ADDL 15 MIN: CPT | Performed by: PHARMACIST

## 2021-11-11 PROCEDURE — 0064A COVID-19,PF,MODERNA (18+ YRS BOOSTER .25ML): CPT | Performed by: FAMILY MEDICINE

## 2021-11-11 PROCEDURE — 99213 OFFICE O/P EST LOW 20 MIN: CPT | Performed by: FAMILY MEDICINE

## 2021-11-11 RX ORDER — LIRAGLUTIDE 6 MG/ML
1.8 INJECTION SUBCUTANEOUS DAILY
Qty: 27 ML | Refills: 3 | Status: SHIPPED | OUTPATIENT
Start: 2021-11-11 | End: 2022-12-14

## 2021-11-11 RX ORDER — FLASH GLUCOSE SCANNING READER
EACH MISCELLANEOUS
COMMUNITY
Start: 2021-09-07 | End: 2021-11-11

## 2021-11-11 RX ORDER — ASPIRIN 81 MG
TABLET, DELAYED RELEASE (ENTERIC COATED) ORAL
COMMUNITY
Start: 2021-03-22 | End: 2021-11-11

## 2021-11-11 NOTE — PROGRESS NOTES
"    Assessment & Plan   (E11.42,  Z79.4) Type 2 diabetes mellitus with diabetic polyneuropathy, with long-term current use of insulin (H)  Comment: controlled  Plan: blood glucose monitoring (ACCU-CHEK MULTICLIX)         lancets, liraglutide (VICTOZA) 18 MG/3ML         solution        Discussed that will start with reducing levemir reduce 2 units from 56 to 54, and if any low sugars to continue to reduce another 2 units again to 52 units.  Continue this pattern until lows are stopped.  In one month recheck a1c and will then work on mealtime control, possibly further reducing novolog.    BMI:   Estimated body mass index is 38.94 kg/m  as calculated from the following:    Height as of 9/20/21: 1.403 m (4' 7.24\").    Weight as of this encounter: 76.7 kg (169 lb).     No follow-ups on file.    Shannan Benson MD  [facility] 65 year [facility] who presents for the following health issues     Chief Complaint   Patient presents with     Diabetes     Imm/Inj       HPI       1. DM: working with PharmD CDE, still at 56 units of levemir, does admit to some low sugar events    2. HTN: tolerating medications    Review of Systems   Denies cardiorespiratory or GI/ issues        Objective    /67   Pulse 58   Temp 98.1  F (36.7  C) (Oral)   Resp 18   Wt 76.7 kg (169 lb)   SpO2 98%   BMI 38.94 kg/m    Body mass index is 38.94 kg/m .  Physical Exam   Gen: alert, oriented X 3, no acute distress, no sign of discomfort  LUNGS: CTAB, no wheezing, no rales, no crackles, no accessory muscle use  COR: no murmurs, clicks, or gallops and slightly bradycardic  -lower extremities : minimal           "

## 2021-11-11 NOTE — Clinical Note
I'll leave her follow up with me up to you! Also see note for details regarding SGLT2, she seems like great candidate at least based on her last ACR.

## 2021-11-11 NOTE — PROGRESS NOTES
Medication Therapy Management (MTM) Encounter    ASSESSMENT:                            Medication Adherence/Access: No issues identified  DM: Last A1c very near goal <7%. Concern however for hypoglycemia that was captured on professional CGM and continues per self-monitor blood glucose values reported by patient. Clear that needs dose reduction in insulin, however pt doesn't seem to perceive this need, needing further education / buy in from physician. Appropriate statin/aspirin.   HTN/albuminuria: Blood pressure controlled today <140/90 mmHg. Losartan is optimized to help treat albuminuria. Next step should be recheck of albumin and initiate SGLT2 if continues to be elevated highly prioritized if >300. If doing so, would recommend empiric reduction in insulin at least 10%.     PLAN:                            1. Reminder provided about eye exam  2. Updated medication list per patient report  3. Recommended to Dr. Benson/med student at least 10% dose reduction in basal and bolus insulin doses - Decrease Levemir to 50 units and Novolog to 18/20 units --> See Dr. Benson's note for final plan    Follow-up: Per physician or patient request, plan follow up w/ primary care provider as per their note    SUBJECTIVE/OBJECTIVE:                          Omid Adorno is a 66 year old female coming in for a follow-up visit. She was referred to me from Dr. Benson. Today's visit is a co-visit with Dr. Benson. Today's visit is a follow-up MTM visit from 9/20/2021. A "[employer]  was used for today's visit.     Documentation:                  Due to patient being non-English speaking/uses sign language, an  was used for this visit. Only for face-to-face interpretation by an external agency, date and length of interpretation can be found on the scanned worksheet.     name: Anastasia Santos  Agency: [employer]  Language: "[employer]   Telephone number:-[phone]  Type of interpretation:  Face-to-face, spoken                        Reason for visit: diabetes follow up.    Allergies/ADRs: Reviewed in chart  Past Medical History: Reviewed in chart  Social History     Tobacco Use     Smoking status: Never Smoker     Smokeless tobacco: Never Used     Tobacco comment: no exposure at home per pt   Substance Use Topics     Alcohol use: No     Alcohol/week: 0.0 standard drinks     Drug use: No     ^Reviewed today    DM: Plans to call and have eye exam scheduled, declines needing assistance with this. At last visit recommended decreasing insulin doses due to hypoglycemia seen on CGM, however pt didn't make change. She felt that the device was causing her lows and that once it was removed the low blood sugar symptoms resolved. Acknowledges was feeling weak, hungry. At first reports that hese symptoms haven't reoccurred, however on review of her self-monitor blood glucose values. Novolog 22 units with larger meal and 20 units with smaller mealy, sometimes with snacks 10 units. Levemir 56 units with bedtime. Victoza 1.8 mg daily. Patient brings self-monitor blood glucose values for review, checking 2-3 times per day. For 2 weeks hasn't checked because didn't have lancets. Daughter helps organize pills in pillbox, PM pill = amlodipine sets pill bottle out to help remember PM dose. Pillbox less confusing. Taking 1 tablet/day of Pioglitazone 15 mg, Aspirin 81 mg and Atorvastatin 40 mg.     Morning Afternoon Late afternoon    110 145     91/185 216 225 139   83      122      122      103      93      134      108      257      127 69     103      225 83 117    132      88      119      219 112     167 161     149      156 113     107 71     62 167     69      239 210 113      Lab Results   Component Value Date    A1C 7.0 09/07/2021    A1C 6.8 06/03/2021    A1C 7.3 03/02/2021    A1C 8.5 10/02/2020    A1C 8.2 06/18/2020    A1C 7.4 01/28/2020     The 10-year ASCVD risk score (Metzbarbara WOMACK Jr., et al., 2013) is: 14.7%     "Values used to calculate the score:      Age: 66 years      Sex: Female      Is Non- : No      Diabetic: Yes      Tobacco smoker: No      Systolic Blood Pressure: 136 mmHg      Is BP treated: Yes      HDL Cholesterol: 53 mg/dL      Total Cholesterol: 137 mg/dL    HTN/albuminuria: Taking 1 tablet/day of Losartan 100/hctz 12.5 mg and Amlodipine 5 mg AM and 2.5 mg PM.     Today's Vitals:   BP Readings from Last 1 Encounters:   11/11/21 136/67     Pulse Readings from Last 1 Encounters:   11/11/21 58     Wt Readings from Last 1 Encounters:   11/11/21 169 lb (76.7 kg)     Ht Readings from Last 1 Encounters:   09/20/21 4' 7.24\" (1.403 m)     Estimated body mass index is 38.94 kg/m  as calculated from the following:    Height as of 9/20/21: 4' 7.24\" (1.403 m).    Weight as of an earlier encounter on 11/11/21: 169 lb (76.7 kg).    Temp Readings from Last 1 Encounters:   11/11/21 98.1  F (36.7  C) (Oral)     ----------------      I spent 30 minutes with this patient today. All changes were made via collaborative practice agreement with Dr. Benson. A copy of the visit note was provided to the patient's primary care provider.    The patient was given a summary of these recommendations. See Provider note/AVS from today.     Parul Stanley, PharmD, CDCES (previously, CDE)  [facility]  Phone: [phone]  November 11, 2021 at 1:14 PM    For insurance/tracking purposes, MTM Team Documentation:   Medication Therapy Recommendations  Type 2 diabetes mellitus with diabetic polyneuropathy, with long-term current use of insulin (H)    Current Medication: insulin detemir (LEVEMIR PEN) 100 UNIT/ML pen   Rationale: Dose too high - Dosage too high - Safety   Recommendation: Decrease Dose   Status: Accepted per Provider                "

## 2021-12-24 ENCOUNTER — OFFICE VISIT (OUTPATIENT)
Dept: FAMILY MEDICINE | Facility: CLINIC | Age: 66
End: 2021-12-24
Payer: COMMERCIAL

## 2021-12-24 VITALS
OXYGEN SATURATION: 98 % | TEMPERATURE: 98 F | SYSTOLIC BLOOD PRESSURE: 137 MMHG | RESPIRATION RATE: 18 BRPM | DIASTOLIC BLOOD PRESSURE: 80 MMHG | HEART RATE: 56 BPM

## 2021-12-24 DIAGNOSIS — Z79.4 TYPE 2 DIABETES MELLITUS WITH DIABETIC POLYNEUROPATHY, WITH LONG-TERM CURRENT USE OF INSULIN (H): ICD-10-CM

## 2021-12-24 DIAGNOSIS — E11.42 TYPE 2 DIABETES MELLITUS WITH DIABETIC POLYNEUROPATHY, WITH LONG-TERM CURRENT USE OF INSULIN (H): ICD-10-CM

## 2021-12-24 DIAGNOSIS — N18.32 STAGE 3B CHRONIC KIDNEY DISEASE (H): ICD-10-CM

## 2021-12-24 LAB
CHOLEST SERPL-MCNC: 128 MG/DL
CREAT UR-MCNC: 49 MG/DL
FASTING STATUS PATIENT QL REPORTED: NO
HBA1C MFR BLD: 6.7 % (ref 0–5.6)
HDLC SERPL-MCNC: 46 MG/DL
HGB BLD-MCNC: 13.1 G/DL (ref 11.7–15.7)
LDLC SERPL CALC-MCNC: 58 MG/DL
MICROALBUMIN UR-MCNC: 24.53 MG/DL (ref 0–1.99)
MICROALBUMIN/CREAT UR: 500.6 MG/G CR
TRIGL SERPL-MCNC: 119 MG/DL

## 2021-12-24 PROCEDURE — 99215 OFFICE O/P EST HI 40 MIN: CPT | Performed by: FAMILY MEDICINE

## 2021-12-24 PROCEDURE — 83036 HEMOGLOBIN GLYCOSYLATED A1C: CPT | Performed by: FAMILY MEDICINE

## 2021-12-24 PROCEDURE — 36415 COLL VENOUS BLD VENIPUNCTURE: CPT | Performed by: FAMILY MEDICINE

## 2021-12-24 PROCEDURE — 80061 LIPID PANEL: CPT | Performed by: FAMILY MEDICINE

## 2021-12-24 PROCEDURE — 85018 HEMOGLOBIN: CPT | Performed by: FAMILY MEDICINE

## 2021-12-24 PROCEDURE — 82043 UR ALBUMIN QUANTITATIVE: CPT | Performed by: FAMILY MEDICINE

## 2021-12-24 NOTE — PROGRESS NOTES
"    Assessment & Plan    (E11.42,  Z79.4) Type 2 diabetes mellitus with diabetic polyneuropathy, with long-term current use of insulin (H)  Comment: well controlled, discussed concern of hypoglycemia, rare event.  Plan: Hemoglobin A1c, Lipid Profile, Albumin Random         Urine Quantitative with Creat Ratio, insulin         detemir (LEVEMIR PEN) 100 UNIT/ML pen, glucose         (BD GLUCOSE) 4 g chewable tablet        Recheck in April May or when issues    (N18.32) Stage 3b chronic kidney disease (H)  Comment: appears stable  Plan: Hemoglobin, Urinalysis, Micro If                        BMI:   Estimated body mass index is 38.94 kg/m  as calculated from the following:    Height as of 9/20/21: 1.403 m (4' 7.24\").    Weight as of 11/11/21: 76.7 kg (169 lb).     No follow-ups on file.      40 minutes spent on the date of the encounter doing chart review, history and exam, documentation and further activities per the note      Shannan Benson MD  [facility] [place]    Cornelia Anne is a 65 year old who presents for the following health issues     Chief Complaint   Patient presents with     Diabetes     DM CHeck       HPI       1. DM: ave. Glucose over 90 day on meter 137., see low of 104  Now on levemir 50 units, still with same novolog amount with foods  -doesn't like fruits/sweets  -still feels like sugars have    Chronic Kidney Disease Follow-up    Do you take any over the counter pain medicine?: Yes  What over the counter medicine are you taking for your pain?:  Tylenol      How often do you take this medicine?:  A few times a week      How many servings of fruits and vegetables do you eat daily?  2-3 but rarely fruits, mainly vegetables          Review of Systems   Denies cardiorespiratory or GI/ issues        Objective    /80   Pulse 56   Temp 98  F (36.7  C) (Oral)   Resp 18   SpO2 98%   There is no height or weight on file to calculate BMI.  Physical Exam   Gen: alert, " oriented X 3, no acute distress, no sign of discomfort  LUNGS: CTAB, no wheezing, no rales, no crackles, no accessory muscle use  COR: no murmurs, clicks, or gallops and slightly bradycardic  -lower extremities : minimal         Results from the last 24 hours   Results for orders placed or performed in visit on 12/24/21 (from the past 24 hour(s))   Hemoglobin   Result Value Ref Range    Hemoglobin 13.1 11.7 - 15.7 g/dL   Hemoglobin A1c   Result Value Ref Range    Hemoglobin A1C 6.7 (H) 0.0 - 5.6 %

## 2022-01-14 NOTE — PATIENT INSTRUCTIONS
STOP taking Lisinopril as of today to see if this is the cause of your cough.     START taking new medicine called Losartan/HCTZ once daily.     Continue with your allergy medication as needed if you need it.       Your medication list is printed, please keep this with you, it is helpful to bring this current list to any other medical appointments, the emergency room or hospital.    If you had lab testing today and your results are reassuring or normal they will be be mailed to you within 7 days.     If the lab tests need quick action we will call you with the results.   The phone number we will call with results is # [phone] (home) . If this is not the best number please call our clinic and change the number.    If you need any refills please call your pharmacy and they will contact us.    If you have any further concerns or wish to schedule another appointment you must call our office during normal business hours  [phone] (8-5:00 M-F)  If you have urgent medical questions that cannot wait  you may also call [phone] at any time of day.  If you have a medical emergency please call [phone].    Thank you for coming to [facility].      Referral for (Test): Hepatology Clinic  Location/Place/Provider: [facility]   Date/Time: 7/12/2018 at 3:10pm    Phone: [phone]   Fax: [phone]     Additional information/prep.: Ride to be set up and Juliet will help.  requested.   Scheduled by: Humaira HOOD CMA (Chrissi)    7.18.18 [facility] consult note 7.16.18 to Dr. Benson. KENJI Jalloh.   ID Progress Note      SUBJECTIVE:    No new complaints. Feels better. Off O2    OBJECTIVE:  Tmax Temp (24hrs), Av.6 °F (36.4 °C), Min:97.2 °F (36.2 °C), Max:98.1 °F (36.7 °C)     Last Vitals   Vitals:    22 1606   BP: 124/64   Pulse: 63   Resp: 16   Temp: 97.7 °F (36.5 °C)         Gen: Well developed, well nourished. Not in distress  HEENT: EOMI, DICK, No oral lesions  Neck:  Supple, No JVD, No bruits, No LAP.  CVS:  HR - RRR, S1, S2. No murmur  Lung: Clear to auscultation, no dullness to percussion.  Abd:  Soft, Non-tender. No organomegaly, No palpable masses, NABS  : Unremarkable  Extr:  No cyanosis, clubbing or edema  Neuro: No focal deficits  Skin: No rashes or ulcerations.      MEDICATIONS;    As per MAR and reviewed      LABS AND IMAGING: REVIEWED    Recent Labs     22  0554   WBC 4.4   RBC 3.29*   HGB 11.1*   HCT 31.2*            Microbiology Results     None           US VAS EXTREMITY LOWER VENOUS DUPLEX    Result Date: 2022  EXAM: US [facility] LOWER EXTREMITY VENOUS DUPLEX BILATERAL CLINICAL INDICATION: Bilateral lower extremity swelling. COMPARISON: 2015 TECHNIQUE: Duplex compression technique used FINDINGS: Duplex compression technique has been used to examine the common femoral through popliteal veins.  Complete compressibility is documented. Normal respiratory variation is noted. The proximal profunda and greater saphenous veins appear patent. The visualized calf veins are patent        No evidence of deep venous thrombosis. If symptoms suggesting DVT persist, consider a follow-up ultrasound study at one week.  Two negative exams one week apart is considered sufficient to exclude DVT. (The rationale for the repeat exams is that an undetectable calf vein thrombus may propagate superiorly and become a threat for clinically significant pulmonary embolism, but will be sonographically detectable once it reaches the popliteal vein.) FOR PHYSICIAN USE ONLY - Please note that  this report was generated using voice recognition software.  If you require clarification or feel that there has been an error in this report please contact me through [employer].  Thank you very much for allowing me to participate in the care of your patient. Electronically Signed by: RACHANA MORENO M.D. Signed on: 1/12/2022 6:49 AM          ASSESSMENT:     # Severe COVID 19 pneumonia  # Hypoxia - rsolved  # History of COPD  # History of bronchial asthma  # History of CVA  # History of CKD      Recommendation:     # Cont RDV #4 and dexa  # Remains stable. Ok to dc from ID if medically cleared     Marly Lopez MD  1/14/2022

## 2022-01-18 DIAGNOSIS — M05.741 RHEUMATOID ARTHRITIS INVOLVING BOTH HANDS WITH POSITIVE RHEUMATOID FACTOR (H): ICD-10-CM

## 2022-01-18 DIAGNOSIS — L85.3 DRY SKIN: ICD-10-CM

## 2022-01-18 DIAGNOSIS — M05.742 RHEUMATOID ARTHRITIS INVOLVING BOTH HANDS WITH POSITIVE RHEUMATOID FACTOR (H): ICD-10-CM

## 2022-01-18 RX ORDER — AMMONIUM LACTATE 12 G/100G
CREAM TOPICAL 2 TIMES DAILY
Qty: 140 G | Refills: 3 | Status: SHIPPED | OUTPATIENT
Start: 2022-01-18 | End: 2023-03-02

## 2022-01-18 RX ORDER — ACETAMINOPHEN 325 MG/1
650 TABLET ORAL 4 TIMES DAILY
Qty: 100 TABLET | Refills: 3 | Status: SHIPPED | OUTPATIENT
Start: 2022-01-18 | End: 2022-09-12

## 2022-01-18 NOTE — TELEPHONE ENCOUNTER
Message to physician:     Date of last visit: 12/24/2021    Date of next visit if scheduled: none    Potassium   Date Value Ref Range Status   03/02/2021 4.6 3.4 - 5.3 mmol/L Final     Creatinine   Date Value Ref Range Status   03/02/2021 1.5 (H) 0.6 - 1.3 mg/dL Final     GFR Estimate   Date Value Ref Range Status   07/19/2017 40 (L) >60 mL/min/1.73m2 Final       BP Readings from Last 3 Encounters:   12/24/21 137/80   11/11/21 136/67   09/20/21 131/69       Hemoglobin A1C POCT   Date Value Ref Range Status   06/03/2021 6.8 (H) 4.1 - 5.7 % Final     Hemoglobin A1C   Date Value Ref Range Status   12/24/2021 6.7 (H) 0.0 - 5.6 % Final     Comment:     Normal <5.7%   Prediabetes 5.7-6.4%    Diabetes 6.5% or higher     Note: Adopted from ADA consensus guidelines.       Please complete refill and CLOSE ENCOUNTER.  Closing the encounter signifies the refill is complete.

## 2022-02-14 DIAGNOSIS — Z79.4 TYPE 2 DIABETES MELLITUS WITH DIABETIC POLYNEUROPATHY, WITH LONG-TERM CURRENT USE OF INSULIN (H): ICD-10-CM

## 2022-02-14 DIAGNOSIS — E11.42 TYPE 2 DIABETES MELLITUS WITH DIABETIC POLYNEUROPATHY, WITH LONG-TERM CURRENT USE OF INSULIN (H): ICD-10-CM

## 2022-03-10 ENCOUNTER — TELEPHONE (OUTPATIENT)
Dept: FAMILY MEDICINE | Facility: CLINIC | Age: 67
End: 2022-03-10
Payer: COMMERCIAL

## 2022-04-19 ENCOUNTER — TELEPHONE (OUTPATIENT)
Dept: FAMILY MEDICINE | Facility: CLINIC | Age: 67
End: 2022-04-19

## 2022-04-19 NOTE — TELEPHONE ENCOUNTER
[facility] phone call message- general phone call:    Reason for call: Case manger call and advised will be needing to do TB screening, due to patient is exposure paient. Will be needing to get x ray done to compare 07/23/2022 and 12/4/ 2019. I advised Pt have appointment for today and I can note it down in the appointment for today.     Massage  to hand out ROGELIO to williams in order to fax over information to case bryon.    If any further question please do call case manger Hela directly or can reach RNShakira.    Shakira # [phone]  Fax # [phone]      Return call needed: No    OK to leave a message on voice mail? Yes    Primary language: Hmong      needed? Yes    Call taken on April 19, 2022 at 8:45 AM by Nani Felix

## 2022-05-03 ENCOUNTER — OFFICE VISIT (OUTPATIENT)
Dept: FAMILY MEDICINE | Facility: CLINIC | Age: 67
End: 2022-05-03
Payer: COMMERCIAL

## 2022-05-03 VITALS
OXYGEN SATURATION: 98 % | TEMPERATURE: 97 F | DIASTOLIC BLOOD PRESSURE: 65 MMHG | SYSTOLIC BLOOD PRESSURE: 156 MMHG | BODY MASS INDEX: 40.56 KG/M2 | WEIGHT: 176 LBS | RESPIRATION RATE: 18 BRPM | HEART RATE: 65 BPM

## 2022-05-03 DIAGNOSIS — N18.32 STAGE 3B CHRONIC KIDNEY DISEASE (H): ICD-10-CM

## 2022-05-03 DIAGNOSIS — E66.01 CLASS 3 SEVERE OBESITY WITH SERIOUS COMORBIDITY AND BODY MASS INDEX (BMI) OF 40.0 TO 44.9 IN ADULT, UNSPECIFIED OBESITY TYPE (H): ICD-10-CM

## 2022-05-03 DIAGNOSIS — E11.42 TYPE 2 DIABETES MELLITUS WITH DIABETIC POLYNEUROPATHY, WITH LONG-TERM CURRENT USE OF INSULIN (H): ICD-10-CM

## 2022-05-03 DIAGNOSIS — I10 ESSENTIAL HYPERTENSION: ICD-10-CM

## 2022-05-03 DIAGNOSIS — Z79.4 TYPE 2 DIABETES MELLITUS WITH DIABETIC POLYNEUROPATHY, WITH LONG-TERM CURRENT USE OF INSULIN (H): ICD-10-CM

## 2022-05-03 DIAGNOSIS — H42 GLAUCOMA OF BOTH EYES ASSOCIATED WITH UNDERLYING DISEASE: ICD-10-CM

## 2022-05-03 DIAGNOSIS — Z22.7 LATENT TUBERCULOSIS: Primary | ICD-10-CM

## 2022-05-03 DIAGNOSIS — B18.1 HEPATITIS B CARRIER (H): ICD-10-CM

## 2022-05-03 DIAGNOSIS — M06.4 INFLAMMATORY POLYARTHROPATHY (H): ICD-10-CM

## 2022-05-03 DIAGNOSIS — E66.813 CLASS 3 SEVERE OBESITY WITH SERIOUS COMORBIDITY AND BODY MASS INDEX (BMI) OF 40.0 TO 44.9 IN ADULT, UNSPECIFIED OBESITY TYPE (H): ICD-10-CM

## 2022-05-03 LAB
ANION GAP SERPL CALCULATED.3IONS-SCNC: 8 MMOL/L (ref 5–18)
BUN SERPL-MCNC: 54 MG/DL (ref 8–22)
CALCIUM SERPL-MCNC: 8.9 MG/DL (ref 8.5–10.5)
CHLORIDE BLD-SCNC: 113 MMOL/L (ref 98–107)
CO2 SERPL-SCNC: 20 MMOL/L (ref 22–31)
CREAT SERPL-MCNC: 1.55 MG/DL (ref 0.6–1.1)
GFR SERPL CREATININE-BSD FRML MDRD: 37 ML/MIN/1.73M2
GLUCOSE BLD-MCNC: 72 MG/DL (ref 70–125)
HBA1C MFR BLD: 7.4 % (ref 0–5.6)
POTASSIUM BLD-SCNC: 4.3 MMOL/L (ref 3.5–5)
SODIUM SERPL-SCNC: 141 MMOL/L (ref 136–145)

## 2022-05-03 PROCEDURE — 36415 COLL VENOUS BLD VENIPUNCTURE: CPT | Performed by: FAMILY MEDICINE

## 2022-05-03 PROCEDURE — 80048 BASIC METABOLIC PNL TOTAL CA: CPT | Performed by: FAMILY MEDICINE

## 2022-05-03 PROCEDURE — 99215 OFFICE O/P EST HI 40 MIN: CPT | Performed by: FAMILY MEDICINE

## 2022-05-03 PROCEDURE — 81001 URINALYSIS AUTO W/SCOPE: CPT | Performed by: FAMILY MEDICINE

## 2022-05-03 PROCEDURE — 83036 HEMOGLOBIN GLYCOSYLATED A1C: CPT | Performed by: FAMILY MEDICINE

## 2022-05-03 RX ORDER — LATANOPROST 50 UG/ML
1 SOLUTION/ DROPS OPHTHALMIC DAILY
Qty: 2.5 ML | Refills: 3 | Status: SHIPPED | OUTPATIENT
Start: 2022-05-03 | End: 2023-03-02

## 2022-05-03 RX ORDER — HYDROCHLOROTHIAZIDE 25 MG/1
25 TABLET ORAL DAILY
Qty: 90 TABLET | Refills: 3 | Status: SHIPPED | OUTPATIENT
Start: 2022-05-03 | End: 2023-03-02

## 2022-05-03 RX ORDER — LOSARTAN POTASSIUM 100 MG/1
100 TABLET ORAL DAILY
Qty: 90 TABLET | Refills: 3 | Status: SHIPPED | OUTPATIENT
Start: 2022-05-03 | End: 2023-04-26

## 2022-05-03 NOTE — PROGRESS NOTES
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  (primary encounter diagnosis)  Comment: negative surveillance cxr  Plan: XR CHEST 2 VW        Will send report to [facility] TB clinic, no treatment    (E11.42,  Z79.4) Type 2 diabetes mellitus with diabetic polyneuropathy, with long-term current use of insulin (H)  Comment: at goal <8 and with neuropathy plus retinopathy  Plan: Basic metabolic panel, Hemoglobin A1c, Adult         Eye Referral        Release of information obtained. Has driving restrictions due to vision    (I10) Essential hypertension  Comment: not at goal at clinic, but reports at goal at other locations  Plan: UA reflex to Microscopic, losartan (COZAAR) 100        MG tablet, hydrochlorothiazide (HYDRODIURIL) 25        MG tablet        Dislikes larger combined losartan.  Will separate to losartan alone and increase hydrochlorothiazide to 25 mg    (M06.4) Inflammatory polyarthropathy (H)  Comment: stable  Plan: no changes no progression, will continue to monitor, patient not requesting meds.    (E66.01,  Z68.41) Class 3 severe obesity with serious comorbidity and body mass index (BMI) of 40.0 to 44.9 in adult, unspecified obesity type (H)  Comment:   Wt Readings from Last 5 Encounters:   05/03/22 79.8 kg (176 lb)   11/11/21 76.7 kg (169 lb)   09/20/21 78 kg (172 lb)   09/15/21 78 kg (172 lb)   09/07/21 78.9 kg (174 lb)     Plan: continue to work on exercise and healthy eating    (B18.1) Hepatitis B carrier (H)  Comment: seeing [facility]  Plan: declined treatment and last seen in 2021, reviewed notes     (N18.32) Stage 3b chronic kidney disease (H)  Comment: watch progression closely  Plan: will change and work on improving BP control but will need to watch closely, encourage hydration and kidney recheck      40 minutes spent on the date of the encounter doing chart review, history and exam, documentation and further activities per the note       BMI:   Estimated body mass  "index is 40.56 kg/m  as calculated from the following:    Height as of 9/20/21: 1.403 m (4' 7.24\").    Weight as of this encounter: 79.8 kg (176 lb).   Weight management plan: work on smaller rice portions, and try added activity        No follow-ups on file.    Shannan Benson MD  [facility] [place] Subjective Chong is a 66 year old who presents for the following health issues  accompanied by her .    HPI     Diabetes Follow-up      How often are you checking your blood sugar? Checking sugars 0-3, usually before breakfast AM , sometimes after dinner usually 120-150s when checking    What concerns do you have today about your diabetes? None     Do you have any of these symptoms? (Select all that apply)  No numbness or tingling in feet.  No redness, sores or blisters on feet.  No complaints of excessive thirst.  No reports of blurry vision.  No significant changes to weight.    Have you had a diabetic eye exam in the last 12 months? Yes- Date of last eye exam: Nov,  Location: [facility]        BP Readings from Last 2 Encounters:   05/03/22 (!) 156/65   12/24/21 137/80     Hemoglobin A1C POCT (%)   Date Value   06/03/2021 6.8 (H)   03/02/2021 7.3 (H)     Hemoglobin A1C (%)   Date Value   05/03/2022 7.4 (H)   12/24/2021 6.7 (H)     LDL Cholesterol Calculated (mg/dL)   Date Value   12/24/2021 58   07/24/2012 83   11/08/2011 98     LDL Cholesterol Direct (mg/dL)   Date Value   10/02/2020 52.0   03/08/2019 49.0               Hypertension Follow-up      Do you check your blood pressure regularly outside of the clinic? No     Are you following a low salt diet? Yes    Are your blood pressures ever more than 140 on the top number (systolic) OR more   than 90 on the bottom number (diastolic), for example 140/90? No    Had Moderna  #4 dose 4/5/22    Arthritis:  No changes to current joints, same level as always of function, stiffness,       Hx of TB (latent) and " exposure at  several years ago  -on yearly CXR maintenance per [facility] as surveilance didn't repeat treatment as recommended    Review of Systems   Denies cough/cold/fevers/night sweats/weight loss      Objective    BP (!) 156/65   Pulse 65   Temp 97  F (36.1  C) (Tympanic)   Resp 18   Wt 79.8 kg (176 lb)   SpO2 98%   BMI 40.56 kg/m    Body mass index is 40.56 kg/m .  Physical Exam   Gen: alert, oriented X 3, no acute distress, no sign of discomfort  LUNGS: CTAB, no wheezing, no rales, no crackles, no accessory muscle use  COR: normal rate, regular rhythm and no murmurs, clicks, or gallops  -lower extremities : 1+ and to mid shin       Results for orders placed or performed in visit on 05/03/22 (from the past 24 hour(s))   Basic metabolic panel   Result Value Ref Range    Sodium 141 136 - 145 mmol/L    Potassium 4.3 3.5 - 5.0 mmol/L    Chloride 113 (H) 98 - 107 mmol/L    Carbon Dioxide (CO2) 20 (L) 22 - 31 mmol/L    Anion Gap 8 5 - 18 mmol/L    Urea Nitrogen 54 (H) 8 - 22 mg/dL    Creatinine 1.55 (H) 0.60 - 1.10 mg/dL    Calcium 8.9 8.5 - 10.5 mg/dL    Glucose 72 70 - 125 mg/dL    GFR Estimate 37 (L) >60 mL/min/1.73m2   Hemoglobin A1c   Result Value Ref Range    Hemoglobin A1C 7.4 (H) 0.0 - 5.6 %       CXR: normal

## 2022-05-04 LAB
ALBUMIN UR-MCNC: 50 MG/DL
APPEARANCE UR: CLEAR
BACTERIA #/AREA URNS HPF: ABNORMAL /HPF
BILIRUB UR QL STRIP: NEGATIVE
COLOR UR AUTO: ABNORMAL
GLUCOSE UR STRIP-MCNC: NEGATIVE MG/DL
HGB UR QL STRIP: ABNORMAL
KETONES UR STRIP-MCNC: NEGATIVE MG/DL
LEUKOCYTE ESTERASE UR QL STRIP: ABNORMAL
NITRATE UR QL: NEGATIVE
PH UR STRIP: 5.5 [PH] (ref 5–7)
RBC URINE: 2 /HPF
SP GR UR STRIP: 1.02 (ref 1–1.03)
SQUAMOUS EPITHELIAL: 9 /HPF
TRANSITIONAL EPI: <1 /HPF
UROBILINOGEN UR STRIP-ACNC: ABNORMAL
WBC URINE: 2 /HPF

## 2022-05-04 NOTE — RESULT ENCOUNTER NOTE
Call patient:    Kidneys function is slightly lower.  Start the new blood pressure medication, but it's important to come in person in 2 weeks to recheck your kidney and electrolytes.  Can do a future lab test and then virtual visit.    Try to drink more water that also helps.  Shannan Benson MD

## 2022-05-05 NOTE — PATIENT INSTRUCTIONS
Referral for :     Ophthalmology    LOCATION/PLACE/Provider :    [facility]   DATE & TIME :    Faxed to location, they will call patient   PHONE :     [phone]  FAX :    [phone]  Appointment made by clinic staff/:    Rowena

## 2022-05-16 DIAGNOSIS — K21.9 GASTROESOPHAGEAL REFLUX DISEASE WITHOUT ESOPHAGITIS: ICD-10-CM

## 2022-05-17 ENCOUNTER — OFFICE VISIT (OUTPATIENT)
Dept: FAMILY MEDICINE | Facility: CLINIC | Age: 67
End: 2022-05-17
Payer: COMMERCIAL

## 2022-05-17 VITALS
HEART RATE: 57 BPM | TEMPERATURE: 97.6 F | WEIGHT: 173 LBS | SYSTOLIC BLOOD PRESSURE: 127 MMHG | DIASTOLIC BLOOD PRESSURE: 74 MMHG | BODY MASS INDEX: 40.04 KG/M2 | HEIGHT: 55 IN | OXYGEN SATURATION: 99 %

## 2022-05-17 DIAGNOSIS — I10 ESSENTIAL HYPERTENSION: Primary | ICD-10-CM

## 2022-05-17 DIAGNOSIS — R00.1 SINUS BRADYCARDIA: ICD-10-CM

## 2022-05-17 PROCEDURE — 99213 OFFICE O/P EST LOW 20 MIN: CPT | Performed by: FAMILY MEDICINE

## 2022-05-17 ASSESSMENT — PATIENT HEALTH QUESTIONNAIRE - PHQ9: SUM OF ALL RESPONSES TO PHQ QUESTIONS 1-9: 4

## 2022-05-17 NOTE — ASSESSMENT & PLAN NOTE
Stable on current Losartan 100 and hydrochlorothiazide 25    Visit in August will recheck with diabetes blood work.

## 2022-05-17 NOTE — PROGRESS NOTES
"  Assessment & Plan   Problem List Items Addressed This Visit     Sinus bradycardia     Asymptomatic, upper 50s, no family hx of arrythmia/pacemaker  -follow and if further slows down discussed signs and symptoms and will consider holter/referral           Essential hypertension - Primary     Stable on current Losartan 100 and hydrochlorothiazide 25    Visit in August will recheck with diabetes blood work.                               No follow-ups on file.    Shannan Benson MD  [facility] [place] Subjective Chong is a 66 year old who presents for the following health issues  accompanied by her .    HPI     Hypertension Follow-up      Do you check your blood pressure regularly outside of the clinic? No     Are you following a low salt diet? Yes    Taking new meds:  Losartan and then hydrochlorothiazide and increased to 25 mg of hydrochlorothiazide    Tolerating this change, no issues with low bp, urination, feeling dehydrated      Review of Systems   See HPI      Objective    /74   Pulse 57   Temp 97.6  F (36.4  C) (Oral)   Ht 1.4 m (4' 7.12\")   Wt 78.5 kg (173 lb)   SpO2 99%   BMI 40.04 kg/m    Body mass index is 40.04 kg/m .  Physical Exam   GENERAL: healthy, alert and no distress  RESP: lungs clear to auscultation - no rales, rhonchi or wheezes  CV: slight bradycardic rate and rhythm, normal S1 S2, no S3 or S4, no murmur, click or rub, no peripheral edema and peripheral pulses strong  MS: no gross musculoskeletal defects noted, no edema                "

## 2022-05-17 NOTE — ASSESSMENT & PLAN NOTE
Asymptomatic, upper 50s, no family hx of arrythmia/pacemaker  -follow and if further slows down discussed signs and symptoms and will consider holter/referral

## 2022-05-31 DIAGNOSIS — I10 ESSENTIAL HYPERTENSION: ICD-10-CM

## 2022-05-31 DIAGNOSIS — Z79.4 TYPE 2 DIABETES MELLITUS WITH DIABETIC POLYNEUROPATHY, WITH LONG-TERM CURRENT USE OF INSULIN (H): ICD-10-CM

## 2022-05-31 DIAGNOSIS — E11.42 TYPE 2 DIABETES MELLITUS WITH DIABETIC POLYNEUROPATHY, WITH LONG-TERM CURRENT USE OF INSULIN (H): ICD-10-CM

## 2022-05-31 RX ORDER — ASPIRIN 81 MG/1
81 TABLET, COATED ORAL DAILY
Qty: 90 TABLET | Refills: 3 | Status: SHIPPED | OUTPATIENT
Start: 2022-05-31 | End: 2023-06-23

## 2022-05-31 RX ORDER — AMLODIPINE BESYLATE 2.5 MG/1
TABLET ORAL
Qty: 270 TABLET | Refills: 3 | Status: SHIPPED | OUTPATIENT
Start: 2022-05-31 | End: 2023-03-02

## 2022-06-06 DIAGNOSIS — E11.42 TYPE 2 DIABETES MELLITUS WITH DIABETIC POLYNEUROPATHY, WITH LONG-TERM CURRENT USE OF INSULIN (H): ICD-10-CM

## 2022-06-06 DIAGNOSIS — Z79.4 TYPE 2 DIABETES MELLITUS WITH DIABETIC POLYNEUROPATHY, WITH LONG-TERM CURRENT USE OF INSULIN (H): ICD-10-CM

## 2022-06-20 DIAGNOSIS — E78.00 PURE HYPERCHOLESTEROLEMIA: ICD-10-CM

## 2022-06-21 RX ORDER — ATORVASTATIN CALCIUM 40 MG/1
40 TABLET, FILM COATED ORAL DAILY
Qty: 90 TABLET | Refills: 1 | Status: SHIPPED | OUTPATIENT
Start: 2022-06-21 | End: 2022-10-06

## 2022-07-20 ENCOUNTER — TELEPHONE (OUTPATIENT)
Dept: FAMILY MEDICINE | Facility: CLINIC | Age: 67
End: 2022-07-20

## 2022-07-20 NOTE — TELEPHONE ENCOUNTER
[facility] phone call message- general phone call:    Reason for call:     Caller advised no longer recommending every 6 Mo chest x ray for tb. If have any further question, please call and advised. Thank you.     Return call needed: Yes    OK to leave a message on voice mail? Yes    Primary language: ong      needed? Yes    Call taken on July 20, 2022 at 10:14 AM by Nani Felix

## 2022-07-21 PROBLEM — Z22.7 LATENT TUBERCULOSIS: Status: RESOLVED | Noted: 2019-12-19 | Resolved: 2022-07-21

## 2022-07-21 NOTE — TELEPHONE ENCOUNTER
Permanent Comments    TB Symptom Screen needed prior to appointments -had positive TB exposure 12/2019 [place] FELICIANO    In 2017 dx Latent TB, declined Moxifloxacin and chose active monitoring x 2 years,every 6 months since 2017. In 2019 MDANN reported pt was re-exposed to someone who had TB. Per MD 7/2020 ongoing monitoring-CXR. Assess for symptoms as noted above    ENDING the monitoring.  Placed problem list into the history.

## 2022-08-03 ENCOUNTER — DOCUMENTATION ONLY (OUTPATIENT)
Dept: FAMILY MEDICINE | Facility: CLINIC | Age: 67
End: 2022-08-03

## 2022-08-03 ENCOUNTER — TRANSFERRED RECORDS (OUTPATIENT)
Dept: HEALTH INFORMATION MANAGEMENT | Facility: CLINIC | Age: 67
End: 2022-08-03

## 2022-08-04 ENCOUNTER — VIRTUAL VISIT (OUTPATIENT)
Dept: FAMILY MEDICINE | Facility: CLINIC | Age: 67
End: 2022-08-04
Payer: COMMERCIAL

## 2022-08-04 DIAGNOSIS — J06.9 VIRAL UPPER RESPIRATORY TRACT INFECTION: ICD-10-CM

## 2022-08-04 PROCEDURE — 99442 PR PHYSICIAN TELEPHONE EVALUATION 11-20 MIN: CPT | Mod: 95 | Performed by: FAMILY MEDICINE

## 2022-08-04 RX ORDER — BENZONATATE 200 MG/1
200 CAPSULE ORAL 3 TIMES DAILY PRN
Qty: 42 CAPSULE | Refills: 0 | Status: SHIPPED | OUTPATIENT
Start: 2022-08-04 | End: 2022-09-20

## 2022-08-04 NOTE — PROGRESS NOTES
Omid is a 66 year old who is being evaluated via a billable telephone visit.    Assessment & Plan     (J06.9) Viral upper respiratory tract infection  Comment: will start with symptomatic treatment  Plan: benzonatate (TESSALON) 200 MG capsule        Follow up if not better in 2-3 weeks                   No follow-ups on file.    Shannan Benson MD  [facility]    Cornelia Anne is a 66 year old accompanied by her , presenting for the following health issues:  Cough      HPI     Acute Illness  Acute illness concerns: Cough  Onset/Duration: 5 days  Symptoms:    Started with sore throat and 3 days later developed a cough, had some tickle in the throat, has had a negative covid test,     Not around anyone ill,     Has had a cough like this fall or winter in past years, unusual during the summer.    Fever: No  Chills/Sweats: No  Headache (location?): No, unless lots of coughing  Sinus Pressure: No  Ear Pain: no  Rhinorrhea: early morning  Congestion: No  Sore Throat: not pain, just a tickle  Cough: YES-non-productive  Wheeze: No  Nausea: No  Vomiting: No  Diarrhea: No  Sick/Strep Exposure: No  Therapies tried and outcome: Halls lozenges      Review of Systems   See above      Objective           Vitals:  No vitals were obtained today due to virtual visit.    Physical Exam   healthy, alert and no distress  PSYCH: Alert and oriented times 3; coherent speech, normal   rate and volume, able to articulate logical thoughts, able   to abstract reason, no tangential thoughts, no hallucinations   or delusions  Her affect is normal  RESP: No cough, no audible wheezing, able to talk in full sentences  Remainder of exam unable to be completed due to telephone visits                Phone call duration: 16 minutes    .  ..

## 2022-08-18 NOTE — PROGRESS NOTES
Telephone call to the client's home for annual health risk assessment.  An  was present.    Completed plan of care will be faxed to PCP.    Omid has a [employer] worker that manages her budget and services.  Main concern is her cough and sore throat.  She complains of hand pain that are numb/tingling.  Which she relates to nerve damage.  She reports attending adult day care 5 days a week and has 5.75 hours a PCA a day.  Does not want to complete wellness, mammo or colon screen until after COVID.    Other  Importance of safe proper disposal of controlled substances discussed with client and resources for med disposal sites provided.        CC reviewed and received expressed/verbal approval by (member or legal rep) of the care plan, including choice of services and providers, choices related to sharing the plan or portions of the plan with others, appeals rights, and data privacy information .

## 2022-08-23 ENCOUNTER — OFFICE VISIT (OUTPATIENT)
Dept: FAMILY MEDICINE | Facility: CLINIC | Age: 67
End: 2022-08-23
Payer: COMMERCIAL

## 2022-08-23 VITALS
RESPIRATION RATE: 20 BRPM | DIASTOLIC BLOOD PRESSURE: 73 MMHG | HEART RATE: 51 BPM | OXYGEN SATURATION: 99 % | BODY MASS INDEX: 38.24 KG/M2 | WEIGHT: 170 LBS | HEIGHT: 56 IN | SYSTOLIC BLOOD PRESSURE: 146 MMHG

## 2022-08-23 DIAGNOSIS — I10 ESSENTIAL HYPERTENSION: ICD-10-CM

## 2022-08-23 DIAGNOSIS — E11.42 TYPE 2 DIABETES MELLITUS WITH DIABETIC POLYNEUROPATHY, WITH LONG-TERM CURRENT USE OF INSULIN (H): Primary | ICD-10-CM

## 2022-08-23 DIAGNOSIS — F33.1 MAJOR DEPRESSIVE DISORDER, RECURRENT EPISODE, MODERATE (H): ICD-10-CM

## 2022-08-23 DIAGNOSIS — R00.1 SINUS BRADYCARDIA: ICD-10-CM

## 2022-08-23 DIAGNOSIS — Z79.4 TYPE 2 DIABETES MELLITUS WITH DIABETIC POLYNEUROPATHY, WITH LONG-TERM CURRENT USE OF INSULIN (H): Primary | ICD-10-CM

## 2022-08-23 LAB — HBA1C MFR BLD: 7.8 % (ref 0–5.6)

## 2022-08-23 PROCEDURE — 36415 COLL VENOUS BLD VENIPUNCTURE: CPT | Performed by: FAMILY MEDICINE

## 2022-08-23 PROCEDURE — 83036 HEMOGLOBIN GLYCOSYLATED A1C: CPT | Performed by: FAMILY MEDICINE

## 2022-08-23 PROCEDURE — 99214 OFFICE O/P EST MOD 30 MIN: CPT | Performed by: FAMILY MEDICINE

## 2022-08-23 NOTE — PROGRESS NOTES
"  Assessment & Plan     (E11.42,  Z79.4) Type 2 diabetes mellitus with diabetic polyneuropathy, with long-term current use of insulin (H)  (primary encounter diagnosis)  Comment: some low sugars will reduce levemir   Plan: Hemoglobin A1c, ROUTINE FOOT CARE BY A         PHYSICIAN OF A DIABETIC PATIENT WITH DIABETIC         SENSORY, insulin detemir (LEVEMIR PEN) 100         UNIT/ML pen        levemir changed from 50 units at bedtime to 48 units at bedtime    (F33.1) Major depressive disorder, recurrent episode, moderate (H)  Comment: stable  Plan: no need for medication, possibly just more of a chronic dysthmia    (R00.1) Sinus bradycardia  Comment: discussed recommendation to wear holter: Patient declined that  Plan: awareness of low heart rate may be contributing to \"weak\" feeling, unable to check pulse but will check perhaps with home bp cuff    (I10) Essential hypertension  Comment: would consider adjusting, but close to goal  Plan: recheck with phone visit and home monitoring in 2 weeks, adjust if needed.                     No follow-ups on file.    Shnanan Benson MD  [facility]    Cornelia Anne is a 67 year old accompanied by her , presenting for the following health issues:  Diabetes      HPI     Diabetes Follow-up    How often are you checking your blood sugar? A few times a week  What time of day are you checking your blood sugars (select all that apply)?  random times  Have you had any blood sugars above 200?  Yes but not too often 220s  Have you had any blood sugars below 70?  Yes 59 is the lowest, seems random at some low at 5 pm and some low in AM    What symptoms do you notice when your blood sugar is low?  Weak    What concerns do you have today about your diabetes? None and Other: notices lots of sugar variation     Do you have any of these symptoms? (Select all that apply)  No numbness or tingling in feet.  No redness, sores or blisters on feet.  No " "complaints of excessive thirst.  No reports of blurry vision.  No significant changes to weight.      BP Readings from Last 2 Encounters:   08/23/22 (!) 146/73   05/17/22 127/74     Hemoglobin A1C (%)   Date Value   05/03/2022 7.4 (H)   12/24/2021 6.7 (H)   06/03/2021 6.8 (H)   03/02/2021 7.3 (H)     LDL Cholesterol Calculated (mg/dL)   Date Value   12/24/2021 58   07/24/2012 83   11/08/2011 98     LDL Cholesterol Direct (mg/dL)   Date Value   10/02/2020 52.0   03/08/2019 49.0               Hypertension Follow-up      Do you check your blood pressure regularly outside of the clinic? Yes     Are you following a low salt diet? Yes    Are your blood pressures ever more than 140 on the top number (systolic) OR more   than 90 on the bottom number (diastolic), for example 140/90? Has been a long time ago    How many days per week do you miss taking your medication? 0    MOOD:  Stable, no changes     JOINTS: two episodes of brief ankle joint pain, lasted a couple days, joints looked the same and took tylenol  -was painful enough wondered about GOUT, doesn't want to get redrawn today but interested in checking    Review of Systems   Denies palpitations, denies issues with bowel, bladder, eyes, mood        Objective    BP (!) 146/73   Pulse 62   Resp 20   Ht 1.41 m (4' 7.51\")   Wt 77.1 kg (170 lb)   SpO2 99%   BMI 38.79 kg/m    Body mass index is 38.79 kg/m .  Physical Exam   GENERAL: healthy, alert and no distress  RESP: lungs clear to auscultation - no rales, rhonchi or wheezes  CV: bradycardic rate and rhythm, normal S1 S2, no S3 or S4, II/VI soft systolic murmur upper sternal border, no peripheral edema and peripheral pulses strong  Diabetic Foot Screen:  Any complaints of increased pain or numbness ? No  Is there a foot ulcer now or a history of foot ulcer? No  Does the foot have an abnormal shape? No  Are the nails thick, too long or ingrown? No  Are there any redness or open areas? No         Sensation Testing " done at all points on the diagram with monofilament     Right Foot: Sensation Normal at all points  Left Foot: Sensation Normal at all points     Risk Category: 0- No loss of protective sensation  Performed by Shannan Benson MD    Results for orders placed or performed in visit on 08/23/22 (from the past 24 hour(s))   Hemoglobin A1c   Result Value Ref Range    Hemoglobin A1C 7.8 (H) 0.0 - 5.6 %                   .  ..

## 2022-08-23 NOTE — NURSING NOTE
Due to patient being non-English speaking/uses sign language, an  was used for this visit. Only for face-to-face interpretation by an external agency, date and length of interpretation can be found on the scanned worksheet.     name: Miguel Felix  Agency: [employer]  Language: Bobby   Telephone number:   Type of interpretation: Face-to-face, spoken

## 2022-09-12 DIAGNOSIS — M05.742 RHEUMATOID ARTHRITIS INVOLVING BOTH HANDS WITH POSITIVE RHEUMATOID FACTOR (H): ICD-10-CM

## 2022-09-12 DIAGNOSIS — M05.741 RHEUMATOID ARTHRITIS INVOLVING BOTH HANDS WITH POSITIVE RHEUMATOID FACTOR (H): ICD-10-CM

## 2022-09-12 RX ORDER — ACETAMINOPHEN 325 MG/1
650 TABLET ORAL 4 TIMES DAILY
Qty: 100 TABLET | Refills: 3 | Status: SHIPPED | OUTPATIENT
Start: 2022-09-12 | End: 2023-07-12

## 2022-09-20 DIAGNOSIS — J06.9 VIRAL UPPER RESPIRATORY TRACT INFECTION: ICD-10-CM

## 2022-09-20 DIAGNOSIS — E55.9 VITAMIN D DEFICIENCY: ICD-10-CM

## 2022-09-20 RX ORDER — BENZONATATE 200 MG/1
200 CAPSULE ORAL 3 TIMES DAILY PRN
Qty: 42 CAPSULE | Refills: 0 | Status: SHIPPED | OUTPATIENT
Start: 2022-09-20 | End: 2024-01-10

## 2022-09-20 RX ORDER — CHOLECALCIFEROL (VITAMIN D3) 25 MCG
1 TABLET ORAL DAILY
Qty: 90 TABLET | Refills: 3 | Status: SHIPPED | OUTPATIENT
Start: 2022-09-20 | End: 2023-09-25

## 2022-10-06 ENCOUNTER — OFFICE VISIT (OUTPATIENT)
Dept: FAMILY MEDICINE | Facility: CLINIC | Age: 67
End: 2022-10-06
Payer: COMMERCIAL

## 2022-10-06 VITALS
WEIGHT: 170 LBS | SYSTOLIC BLOOD PRESSURE: 116 MMHG | RESPIRATION RATE: 18 BRPM | BODY MASS INDEX: 38.79 KG/M2 | TEMPERATURE: 97 F | OXYGEN SATURATION: 97 % | DIASTOLIC BLOOD PRESSURE: 63 MMHG | HEART RATE: 69 BPM

## 2022-10-06 DIAGNOSIS — Z12.11 SCREEN FOR COLON CANCER: ICD-10-CM

## 2022-10-06 DIAGNOSIS — Z78.0 POSTMENOPAUSAL STATUS: ICD-10-CM

## 2022-10-06 DIAGNOSIS — Z23 ENCOUNTER FOR ADMINISTRATION OF COVID-19 VACCINE: ICD-10-CM

## 2022-10-06 DIAGNOSIS — Z12.31 VISIT FOR SCREENING MAMMOGRAM: ICD-10-CM

## 2022-10-06 DIAGNOSIS — I10 ESSENTIAL HYPERTENSION: Primary | ICD-10-CM

## 2022-10-06 DIAGNOSIS — E78.00 PURE HYPERCHOLESTEROLEMIA: ICD-10-CM

## 2022-10-06 PROCEDURE — 99214 OFFICE O/P EST MOD 30 MIN: CPT | Mod: 25 | Performed by: FAMILY MEDICINE

## 2022-10-06 PROCEDURE — 0124A COVID-19,PF,PFIZER BOOSTER BIVALENT: CPT | Performed by: FAMILY MEDICINE

## 2022-10-06 PROCEDURE — 91312 COVID-19,PF,PFIZER BOOSTER BIVALENT: CPT | Performed by: FAMILY MEDICINE

## 2022-10-06 PROCEDURE — 90471 IMMUNIZATION ADMIN: CPT | Performed by: FAMILY MEDICINE

## 2022-10-06 PROCEDURE — 90662 IIV NO PRSV INCREASED AG IM: CPT | Performed by: FAMILY MEDICINE

## 2022-10-06 RX ORDER — ROSUVASTATIN CALCIUM 10 MG/1
10 TABLET, COATED ORAL DAILY
Qty: 90 TABLET | Refills: 3 | Status: SHIPPED | OUTPATIENT
Start: 2022-10-06 | End: 2023-10-23

## 2022-10-06 NOTE — PROGRESS NOTES
Assessment & Plan     (I10) Essential hypertension  (primary encounter diagnosis)  Comment: well controlle  Plan: no changes    (Z12.11) Screen for colon cancer  Comment: agrees and has FIT test at home  Plan: will bring in/mail in    (Z12.31) Visit for screening mammogram  Comment: agrees  Plan: MA SCREENING DIGITAL BILAT - Future  (s+30)            (Z78.0) Postmenopausal status  Comment: agrees especially if on same day as mammogram  Plan: DEXA HIP/PELVIS/SPINE - Future            (E78.00) Pure hypercholesterolemia  Comment: doesn't like current size of atorvastatin, unable to swallow  Plan: rosuvastatin (CRESTOR) 10 MG tablet        Willing to take smaller size pill (needing moderate intensity)    (Z23) Encounter for administration of COVID-19 vaccine  Comment: agrees  Plan: COVID-19,PF,PFIZER BOOSTER BIVALENT (12+YRS),         INFLUENZA, QUAD, HIGH DOSE, PF, 65YR + (FLUZONE        HD)              No follow-ups on file.    Shannan Benson MD  [facility]    Cornelia Anne is a 67 year old accompanied by her , presenting for the following health issues:  Hypertension      HPI     Diabetes Follow-up    How often are you checking your blood sugar? A few times a week  What time of day are you checking your blood sugars (select all that apply)?  random times  Have you had any blood sugars above 200?  No  Have you had any blood sugars below 70?  Most sugars 130-140s    What symptoms do you notice when your blood sugar is low?  Weak    What concerns do you have today about your diabetes? None and Other: notices lots of sugar variation     Do you have any of these symptoms? (Select all that apply)  No numbness or tingling in feet.  No redness, sores or blisters on feet.  No complaints of excessive thirst.  No reports of blurry vision.  No significant changes to weight.      BP Readings from Last 2 Encounters:   10/06/22 116/63   08/23/22 (!) 146/73     Hemoglobin A1C (%)    Date Value   08/23/2022 7.8 (H)   05/03/2022 7.4 (H)   06/03/2021 6.8 (H)   03/02/2021 7.3 (H)     LDL Cholesterol Calculated (mg/dL)   Date Value   12/24/2021 58   07/24/2012 83   11/08/2011 98     LDL Cholesterol Direct (mg/dL)   Date Value   10/02/2020 52.0   03/08/2019 49.0                 Hypertension Follow-up      Do you check your blood pressure regularly outside of the clinic? Yes     Are you following a low salt diet? Yes    Are your blood pressures ever more than 140 on the top number (systolic) OR more   than 90 on the bottom number (diastolic), for example 140/90? Has been a long time ago    How many days per week do you miss taking your medication? 0    MOOD:  Stable, no changes     Review of Systems   Denies palpitations, denies issues with bowel, bladder, eyes, mood        Objective    /63   Pulse 69   Temp 97  F (36.1  C) (Tympanic)   Resp 18   Wt 77.1 kg (170 lb)   SpO2 97%   BMI 38.79 kg/m    Body mass index is 38.79 kg/m .  Physical Exam   GENERAL: healthy, alert and no distress  RESP: lungs clear to auscultation - no rales, rhonchi or wheezes  CV: bradycardic rate and rhythm, normal S1 S2, no S3 or S4, II/VI soft systolic murmur upper sternal border, no peripheral edema and peripheral pulses strong    No results found for this or any previous visit (from the past 24 hour(s)).                .  ..

## 2022-10-10 DIAGNOSIS — Z79.4 TYPE 2 DIABETES MELLITUS WITH DIABETIC POLYNEUROPATHY, WITH LONG-TERM CURRENT USE OF INSULIN (H): ICD-10-CM

## 2022-10-10 DIAGNOSIS — E11.42 TYPE 2 DIABETES MELLITUS WITH DIABETIC POLYNEUROPATHY, WITH LONG-TERM CURRENT USE OF INSULIN (H): ICD-10-CM

## 2022-10-17 ENCOUNTER — HOSPITAL ENCOUNTER (OUTPATIENT)
Dept: BONE DENSITY | Facility: HOSPITAL | Age: 67
Discharge: HOME | End: 2022-10-17
Attending: FAMILY MEDICINE
Payer: COMMERCIAL

## 2022-10-17 ENCOUNTER — ANCILLARY PROCEDURE (OUTPATIENT)
Dept: MAMMOGRAPHY | Facility: HOSPITAL | Age: 67
End: 2022-10-17
Attending: FAMILY MEDICINE
Payer: COMMERCIAL

## 2022-10-17 DIAGNOSIS — Z12.31 VISIT FOR SCREENING MAMMOGRAM: ICD-10-CM

## 2022-10-17 DIAGNOSIS — Z78.0 POSTMENOPAUSAL STATUS: ICD-10-CM

## 2022-10-17 PROCEDURE — 77067 SCR MAMMO BI INCL CAD: CPT

## 2022-10-17 PROCEDURE — 77080 DXA BONE DENSITY AXIAL: CPT

## 2022-10-18 ENCOUNTER — TELEPHONE (OUTPATIENT)
Dept: FAMILY MEDICINE | Facility: CLINIC | Age: 67
End: 2022-10-18

## 2022-11-14 DIAGNOSIS — Z79.4 TYPE 2 DIABETES MELLITUS WITH DIABETIC POLYNEUROPATHY, WITH LONG-TERM CURRENT USE OF INSULIN (H): ICD-10-CM

## 2022-11-14 DIAGNOSIS — E11.42 TYPE 2 DIABETES MELLITUS WITH DIABETIC POLYNEUROPATHY, WITH LONG-TERM CURRENT USE OF INSULIN (H): ICD-10-CM

## 2022-11-15 RX ORDER — PIOGLITAZONEHYDROCHLORIDE 15 MG/1
15 TABLET ORAL DAILY
Qty: 90 TABLET | Refills: 3 | Status: SHIPPED | OUTPATIENT
Start: 2022-11-15 | End: 2023-11-06

## 2022-12-06 ENCOUNTER — OFFICE VISIT (OUTPATIENT)
Dept: FAMILY MEDICINE | Facility: CLINIC | Age: 67
End: 2022-12-06
Payer: COMMERCIAL

## 2022-12-06 VITALS
RESPIRATION RATE: 20 BRPM | HEIGHT: 56 IN | BODY MASS INDEX: 38.02 KG/M2 | OXYGEN SATURATION: 99 % | DIASTOLIC BLOOD PRESSURE: 51 MMHG | TEMPERATURE: 98.1 F | WEIGHT: 169 LBS | SYSTOLIC BLOOD PRESSURE: 136 MMHG | HEART RATE: 59 BPM

## 2022-12-06 DIAGNOSIS — E11.42 TYPE 2 DIABETES MELLITUS WITH DIABETIC POLYNEUROPATHY, WITH LONG-TERM CURRENT USE OF INSULIN (H): Primary | ICD-10-CM

## 2022-12-06 DIAGNOSIS — Z79.4 TYPE 2 DIABETES MELLITUS WITH DIABETIC POLYNEUROPATHY, WITH LONG-TERM CURRENT USE OF INSULIN (H): Primary | ICD-10-CM

## 2022-12-06 DIAGNOSIS — R00.1 SINUS BRADYCARDIA: ICD-10-CM

## 2022-12-06 DIAGNOSIS — N18.32 STAGE 3B CHRONIC KIDNEY DISEASE (H): ICD-10-CM

## 2022-12-06 DIAGNOSIS — Z12.11 SCREEN FOR COLON CANCER: ICD-10-CM

## 2022-12-06 LAB — HBA1C MFR BLD: 7.5 % (ref 0–5.6)

## 2022-12-06 PROCEDURE — 99215 OFFICE O/P EST HI 40 MIN: CPT | Performed by: FAMILY MEDICINE

## 2022-12-06 PROCEDURE — 83036 HEMOGLOBIN GLYCOSYLATED A1C: CPT | Performed by: FAMILY MEDICINE

## 2022-12-06 PROCEDURE — 36415 COLL VENOUS BLD VENIPUNCTURE: CPT | Performed by: FAMILY MEDICINE

## 2022-12-06 NOTE — PATIENT INSTRUCTIONS
Please receive PCV20 vaccine at pharmacy will complete your pneumonia vaccines    Okay to start Shingles vaccine (will need 2 doses in series) at pharmacy.

## 2022-12-07 DIAGNOSIS — Z79.4 TYPE 2 DIABETES MELLITUS WITH DIABETIC POLYNEUROPATHY, WITH LONG-TERM CURRENT USE OF INSULIN (H): ICD-10-CM

## 2022-12-07 DIAGNOSIS — E11.42 TYPE 2 DIABETES MELLITUS WITH DIABETIC POLYNEUROPATHY, WITH LONG-TERM CURRENT USE OF INSULIN (H): ICD-10-CM

## 2022-12-14 DIAGNOSIS — Z79.4 TYPE 2 DIABETES MELLITUS WITH DIABETIC POLYNEUROPATHY, WITH LONG-TERM CURRENT USE OF INSULIN (H): ICD-10-CM

## 2022-12-14 DIAGNOSIS — E11.42 TYPE 2 DIABETES MELLITUS WITH DIABETIC POLYNEUROPATHY, WITH LONG-TERM CURRENT USE OF INSULIN (H): ICD-10-CM

## 2022-12-14 RX ORDER — LIRAGLUTIDE 6 MG/ML
1.8 INJECTION SUBCUTANEOUS DAILY
Qty: 27 ML | Refills: 3 | Status: SHIPPED | OUTPATIENT
Start: 2022-12-14 | End: 2024-01-02

## 2023-01-08 PROCEDURE — 82274 ASSAY TEST FOR BLOOD FECAL: CPT | Performed by: FAMILY MEDICINE

## 2023-01-10 LAB — HEMOCCULT STL QL IA: POSITIVE

## 2023-01-18 NOTE — RESULT ENCOUNTER NOTE
Call patient:    Consistently positive for small invisible amounts of blood in your stool.  I encourage her to go through the colonoscopy because I do not know why and where you may be bleeding.  We can better treat this if we have the image inside, not just a picture from an xray or ct scan.  The proceudre does involve some things in your food and going to the bathroom. you have day before clear liquids and drinking a solution that gives you diarrhea, the procedure is done with sedation and you need a .    I can discuss if you can schedule a visit.  Your next visit due for DM is in March. But feel free to come in early to discuss your stool testing.

## 2023-01-23 NOTE — RESULT ENCOUNTER NOTE
Writer called patient again, made contact. Oimd states the understanding of recommendation for colonoscopy but would like defer this screening. Patient states she does not have any concerns in regards to her bowel pattern, does not have abdominal discomfort. Omid would prefer to come in when she is due for diabetes, in March for follow up and will further have discussion at that time with Dr Benson. Ten FELICIANO

## 2023-02-09 DIAGNOSIS — Z79.4 TYPE 2 DIABETES MELLITUS WITH DIABETIC POLYNEUROPATHY, WITH LONG-TERM CURRENT USE OF INSULIN (H): ICD-10-CM

## 2023-02-09 DIAGNOSIS — E11.42 TYPE 2 DIABETES MELLITUS WITH DIABETIC POLYNEUROPATHY, WITH LONG-TERM CURRENT USE OF INSULIN (H): ICD-10-CM

## 2023-02-13 LAB — RETINOPATHY: NORMAL

## 2023-02-20 PROBLEM — R19.5 POSITIVE FIT (FECAL IMMUNOCHEMICAL TEST): Status: ACTIVE | Noted: 2023-02-20

## 2023-03-02 ENCOUNTER — OFFICE VISIT (OUTPATIENT)
Dept: FAMILY MEDICINE | Facility: CLINIC | Age: 68
End: 2023-03-02
Payer: COMMERCIAL

## 2023-03-02 VITALS
WEIGHT: 175 LBS | DIASTOLIC BLOOD PRESSURE: 70 MMHG | RESPIRATION RATE: 20 BRPM | TEMPERATURE: 98.4 F | OXYGEN SATURATION: 99 % | SYSTOLIC BLOOD PRESSURE: 145 MMHG | BODY MASS INDEX: 39.93 KG/M2 | HEART RATE: 64 BPM

## 2023-03-02 DIAGNOSIS — L85.3 DRY SKIN: ICD-10-CM

## 2023-03-02 DIAGNOSIS — G56.22 ENTRAPMENT OF LEFT ULNAR NERVE: ICD-10-CM

## 2023-03-02 DIAGNOSIS — N18.32 STAGE 3B CHRONIC KIDNEY DISEASE (H): ICD-10-CM

## 2023-03-02 DIAGNOSIS — H42 GLAUCOMA OF BOTH EYES ASSOCIATED WITH UNDERLYING DISEASE: ICD-10-CM

## 2023-03-02 DIAGNOSIS — E11.3293 TYPE 2 DIABETES MELLITUS WITH BOTH EYES AFFECTED BY MILD NONPROLIFERATIVE RETINOPATHY WITHOUT MACULAR EDEMA, WITH LONG-TERM CURRENT USE OF INSULIN (H): Primary | ICD-10-CM

## 2023-03-02 DIAGNOSIS — Z23 ENCOUNTER FOR ADMINISTRATION OF VACCINE: ICD-10-CM

## 2023-03-02 DIAGNOSIS — Z79.4 TYPE 2 DIABETES MELLITUS WITH BOTH EYES AFFECTED BY MILD NONPROLIFERATIVE RETINOPATHY WITHOUT MACULAR EDEMA, WITH LONG-TERM CURRENT USE OF INSULIN (H): Primary | ICD-10-CM

## 2023-03-02 DIAGNOSIS — R19.5 POSITIVE FIT (FECAL IMMUNOCHEMICAL TEST): ICD-10-CM

## 2023-03-02 DIAGNOSIS — I10 ESSENTIAL HYPERTENSION: ICD-10-CM

## 2023-03-02 LAB
ALP SERPL-CCNC: 186 U/L (ref 35–104)
CHOLEST SERPL-MCNC: 130 MG/DL
HBA1C MFR BLD: 7.5 % (ref 0–5.6)
HDLC SERPL-MCNC: 47 MG/DL
HGB BLD-MCNC: 10.5 G/DL (ref 11.7–15.7)
LDLC SERPL CALC-MCNC: 63 MG/DL
NONHDLC SERPL-MCNC: 83 MG/DL
PHOSPHATE SERPL-MCNC: 4 MG/DL (ref 2.5–4.5)
PTH-INTACT SERPL-MCNC: 31 PG/ML (ref 15–65)
TRIGL SERPL-MCNC: 102 MG/DL

## 2023-03-02 PROCEDURE — 85018 HEMOGLOBIN: CPT | Performed by: FAMILY MEDICINE

## 2023-03-02 PROCEDURE — 90471 IMMUNIZATION ADMIN: CPT | Performed by: FAMILY MEDICINE

## 2023-03-02 PROCEDURE — 80061 LIPID PANEL: CPT | Performed by: FAMILY MEDICINE

## 2023-03-02 PROCEDURE — 84075 ASSAY ALKALINE PHOSPHATASE: CPT | Performed by: FAMILY MEDICINE

## 2023-03-02 PROCEDURE — 36415 COLL VENOUS BLD VENIPUNCTURE: CPT | Performed by: FAMILY MEDICINE

## 2023-03-02 PROCEDURE — 83970 ASSAY OF PARATHORMONE: CPT | Performed by: FAMILY MEDICINE

## 2023-03-02 PROCEDURE — 83036 HEMOGLOBIN GLYCOSYLATED A1C: CPT | Performed by: FAMILY MEDICINE

## 2023-03-02 PROCEDURE — 90677 PCV20 VACCINE IM: CPT | Performed by: FAMILY MEDICINE

## 2023-03-02 PROCEDURE — 90472 IMMUNIZATION ADMIN EACH ADD: CPT | Performed by: FAMILY MEDICINE

## 2023-03-02 PROCEDURE — 90750 HZV VACC RECOMBINANT IM: CPT | Performed by: FAMILY MEDICINE

## 2023-03-02 PROCEDURE — 99215 OFFICE O/P EST HI 40 MIN: CPT | Mod: 25 | Performed by: FAMILY MEDICINE

## 2023-03-02 PROCEDURE — 84100 ASSAY OF PHOSPHORUS: CPT | Performed by: FAMILY MEDICINE

## 2023-03-02 RX ORDER — AMMONIUM LACTATE 12 G/100G
CREAM TOPICAL 2 TIMES DAILY
Qty: 140 G | Refills: 3 | Status: SHIPPED | OUTPATIENT
Start: 2023-03-02

## 2023-03-02 RX ORDER — HYDROCHLOROTHIAZIDE 25 MG/1
25 TABLET ORAL DAILY
Qty: 90 TABLET | Refills: 3 | Status: SHIPPED | OUTPATIENT
Start: 2023-03-02 | End: 2023-04-28

## 2023-03-02 RX ORDER — AMLODIPINE BESYLATE 10 MG/1
10 TABLET ORAL EVERY MORNING
Qty: 90 TABLET | Refills: 3 | Status: SHIPPED | OUTPATIENT
Start: 2023-03-02 | End: 2023-06-28

## 2023-03-02 RX ORDER — LATANOPROST 50 UG/ML
1 SOLUTION/ DROPS OPHTHALMIC DAILY
Qty: 2.5 ML | Refills: 3 | Status: SHIPPED | OUTPATIENT
Start: 2023-03-02 | End: 2023-07-12

## 2023-03-02 NOTE — PROGRESS NOTES
Assessment & Plan    (E11.3293,  Z79.4) Type 2 diabetes mellitus with both eyes affected by mild nonproliferative retinopathy without macular edema, with long-term current use of insulin (H)  (primary encounter diagnosis)  Comment: still stable and at goal   Plan: Hemoglobin A1c, Alkaline phosphatase, Lipid         Profile, Albumin Random Urine Quantitative with        Creat Ratio        No changes to insulin, oral or injectible victoza, recheck in 3 months    (N18.32) Stage 3b chronic kidney disease (H)  Comment: monitoring given meds and current dm  Plan: Parathyroid Hormone Intact, Phosphorus          (R19.5) Positive FIT (fecal immunochemical test)  Comment: lengthy conversation about importance given now lower hemoglobin  Plan: Hemoglobin, Colonoscopy Screening          Referral        Reviewed risks, benefits, procedure.  Patient agrees and would consider, needs ride, also requested in the early summer not winter.  Will place referral    (I10) Essential hypertension  Comment: agree elevated and not at goal of <140/90  Plan: amLODIPine (NORVASC) 10 MG tablet,         hydrochlorothiazide (HYDRODIURIL) 25 MG tablet        Increase amlodipine from 7.5 daily to 10 mg daily,  Use up current 2.5 tabs and then  the 10 mg tab    (L85.3) Dry skin  Comment: refill  Plan: ammonium lactate (AMLACTIN) 12 % external cream     (H42) Glaucoma of both eyes associated with underlying disease  Comment: refilled  Plan: latanoprost (XALATAN) 0.005 % ophthalmic         solution            (Z23) Encounter for administration of vaccine  Comment: agrees to vaccinations  Plan: if covered by insurance    (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    Weight as of this encounter: 79.4 kg (175 lb).           No follow-ups on file.    Shannan Benson MD  [facility] is a 67 year old accompanied by her , presenting for the following health issues:  Diabetes      HPI     Diabetes Follow-up      How often are you checking your blood sugar? Random, some AM: notices sticky rice really raises sugars, hardly ever with more common low is 60 now    What concerns do you have today about your diabetes? None     Do you have any of these symptoms? (Select all that apply)  No numbness or tingling in feet.  No redness, sores or blisters on feet.  No complaints of excessive thirst.  No reports of blurry vision.  No significant changes to weight.  TINGLING IN HANDS    Have you had a diabetic eye exam in the last 12 months? Will have this visit soon, sees [facility] ophthomaologist and will have them send results        BP Readings from Last 2 Encounters:   03/02/23 (!) 145/70   12/06/22 136/51     Hemoglobin A1C (%)   Date Value   03/02/2023 7.5 (H)   12/06/2022 7.5 (H)   06/03/2021 6.8 (H)   03/02/2021 7.3 (H)     LDL Cholesterol Calculated (mg/dL)   Date Value   12/24/2021 58   07/24/2012 83   11/08/2011 98     LDL Cholesterol Direct (mg/dL)   Date Value   10/02/2020 52.0   03/08/2019 49.0                 How many days per week do you miss taking your medication? Doesn't miss    2. HTN: noticing even at home BP readings >140  Wonders if can increase amlodipine    3. Left arm pain X 1 week: elbow  -just started hurting without injury, fingers numb (all fingers) and hurts around elbow, sore to touch there,   ROM reduced new for the elbow, but has also for the shoulders      Review of Systems "   Denies palpitations, syncope      Objective    BP (!) 145/70   Pulse 64   Temp 98.4  F (36.9  C) (Oral)   Resp 20   Wt 79.4 kg (175 lb)   SpO2 99%   BMI 39.93 kg/m    Body mass index is 39.93 kg/m .  Physical Exam   GENERAL: healthy, alert and no distress  NECK: no adenopathy, no asymmetry, masses, or scars and thyroid normal to palpation  RESP: lungs clear to auscultation - no rales, rhonchi or wheezes  CV: bradycardia, normal S1 S2, no S3 or S4, no murmur, click or rub and no peripheral edema  MS: no gross musculoskeletal defects noted, no edema  Shoulders: reduced ROM bilateral to 100 deg forward flexion and abduction  Left elbow with ulnar notch pain, pain with flexion, rotation    Results for orders placed or performed in visit on 03/02/23 (from the past 24 hour(s))   Hemoglobin   Result Value Ref Range    Hemoglobin 10.5 (L) 11.7 - 15.7 g/dL   Hemoglobin A1c   Result Value Ref Range    Hemoglobin A1C 7.5 (H) 0.0 - 5.6 %

## 2023-03-07 ENCOUNTER — APPOINTMENT (OUTPATIENT)
Dept: INTERPRETER SERVICES | Facility: CLINIC | Age: 68
End: 2023-03-07
Payer: COMMERCIAL

## 2023-03-07 NOTE — RESULT ENCOUNTER NOTE
Call patient:  Cholesterol levels and lab tests are stable.  No changes to medications based on blood work.

## 2023-03-22 NOTE — TELEPHONE ENCOUNTER
Message to physician: Please verify per patient she is taking the glipizide 3 tabs of 5 mg per day was increased in 9/07/18 and patient is out of the glipizide.    Date of last visit: 9/7/2018     Date of next visit if scheduled: Visit date 10/31/18      Last Comprehensive Metabolic Panel:  Sodium   Date Value Ref Range Status   09/07/2018 145.0 (H) 133.0 - 144.0 mmol/L Final     Potassium   Date Value Ref Range Status   09/07/2018 4.7 3.4 - 5.3 mmol/L Final     Chloride   Date Value Ref Range Status   09/07/2018 112.0 (H) 94.0 - 109.0 mmol/L Final     Carbon Dioxide   Date Value Ref Range Status   09/07/2018 26.0 20.0 - 32.0 mmol/L Final     Glucose   Date Value Ref Range Status   09/07/2018 74.0 60.0 - 109.0 mg/dL Final     Urea Nitrogen   Date Value Ref Range Status   09/07/2018 28.0 7.0 - 30.0 mg/dL Final     Creatinine   Date Value Ref Range Status   09/07/2018 1.1 0.6 - 1.3 mg/dL Final     GFR Estimate   Date Value Ref Range Status   07/19/2017 40 (L) >60 mL/min/1.73m2 Final     Calcium   Date Value Ref Range Status   09/07/2018 9.6 8.5 - 10.4 mg/dL Final       BP Readings from Last 3 Encounters:   09/07/18 158/66   06/27/18 129/84   05/09/18 133/78       Lab Results   Component Value Date    A1C 8.7 09/07/2018    A1C 7.8 05/09/2018    A1C 7.9 02/06/2018    A1C 10.0 10/25/2017    A1C 9.6 07/19/2017                Please complete refill and CLOSE ENCOUNTER.  Closing the encounter signifies the refill is complete.   no no no

## 2023-03-23 ENCOUNTER — OFFICE VISIT (OUTPATIENT)
Dept: FAMILY MEDICINE | Facility: CLINIC | Age: 68
End: 2023-03-23
Payer: COMMERCIAL

## 2023-03-23 VITALS
HEIGHT: 56 IN | BODY MASS INDEX: 38.72 KG/M2 | DIASTOLIC BLOOD PRESSURE: 63 MMHG | HEART RATE: 56 BPM | SYSTOLIC BLOOD PRESSURE: 139 MMHG | WEIGHT: 172.12 LBS | OXYGEN SATURATION: 99 % | RESPIRATION RATE: 16 BRPM

## 2023-03-23 DIAGNOSIS — I10 ESSENTIAL HYPERTENSION: Primary | ICD-10-CM

## 2023-03-23 DIAGNOSIS — G56.22 ENTRAPMENT OF LEFT ULNAR NERVE: ICD-10-CM

## 2023-03-23 DIAGNOSIS — N18.32 STAGE 3B CHRONIC KIDNEY DISEASE (H): ICD-10-CM

## 2023-03-23 DIAGNOSIS — E66.01 CLASS 3 SEVERE OBESITY WITH SERIOUS COMORBIDITY AND BODY MASS INDEX (BMI) OF 40.0 TO 44.9 IN ADULT, UNSPECIFIED OBESITY TYPE (H): ICD-10-CM

## 2023-03-23 DIAGNOSIS — R19.5 POSITIVE FIT (FECAL IMMUNOCHEMICAL TEST): ICD-10-CM

## 2023-03-23 DIAGNOSIS — Z12.31 ENCOUNTER FOR SCREENING MAMMOGRAM FOR BREAST CANCER: ICD-10-CM

## 2023-03-23 DIAGNOSIS — F33.1 MAJOR DEPRESSIVE DISORDER, RECURRENT EPISODE, MODERATE (H): ICD-10-CM

## 2023-03-23 DIAGNOSIS — E66.813 CLASS 3 SEVERE OBESITY WITH SERIOUS COMORBIDITY AND BODY MASS INDEX (BMI) OF 40.0 TO 44.9 IN ADULT, UNSPECIFIED OBESITY TYPE (H): ICD-10-CM

## 2023-03-23 DIAGNOSIS — B18.1 HEPATITIS B CARRIER (H): ICD-10-CM

## 2023-03-23 PROCEDURE — 99215 OFFICE O/P EST HI 40 MIN: CPT | Performed by: FAMILY MEDICINE

## 2023-03-23 PROCEDURE — 99417 PROLNG OP E/M EACH 15 MIN: CPT | Performed by: FAMILY MEDICINE

## 2023-03-23 ASSESSMENT — PATIENT HEALTH QUESTIONNAIRE - PHQ9: SUM OF ALL RESPONSES TO PHQ QUESTIONS 1-9: 9

## 2023-03-23 NOTE — PROGRESS NOTES
Assessment & Plan      (I10) Essential hypertension  (primary encounter diagnosis)  Comment: at goal  Plan: Albumin Random Urine Quantitative with Creat         Ratio        No changes to meds    (N18.32) Stage 3b chronic kidney disease (H)  Comment: watch medication closely  Plan: Albumin Random Urine Quantitative with Creat         Ratio            (G56.22) Entrapment of left ulnar nerve  Comment: resolved  Plan: no further treatment    (R19.5) Positive FIT (fecal immunochemical test)  Comment: agree with colonoscopy  Plan: Adult GI  Referral - Procedure Only        Lengthy discussion about process and diabetes med adjustments, colonoscopy prep and possible upper endoscopy for gastric issues such as ulcers.    (F33.1) Major depressive disorder, recurrent episode, moderate (H)  Comment: patient states related to weakness,  Plan: PATIENT HEALTH QUESTIONNAIRE-9 (PHQ - 9)    Over the last 2 weeks, how often have you been bothered by any of the following problems?    1. Little interest or pleasure in doing things -  Several days   2. Feeling down, depressed, or hopeless -  Several days   3. Trouble falling or staying asleep, or sleeping too much - Several days   4. Feeling tired or having little energy -  Nearly every day   5. Poor appetite or overeating -  Not at all   6. Feeling bad about yourself - or that you are a failure or have let yourself or your family down -  Several days   7. Trouble concentrating on things, such as reading the newspaper or watching television - Several days   8. Moving or speaking so slowly that other people could have noticed? Or the opposite - being so fidgety or restless that you have been moving around a lot more than usual Several days   9. Thoughts that you would be better off dead or of hurting  yourself in some way Not at all   Total Score: 9     (E66.01,  Z68.41) Class 3 severe obesity with serious comorbidity and body mass index (BMI) of 40.0 to 44.9 in adult,  "unspecified obesity type (H)  Comment:   Wt Readings from Last 5 Encounters:   03/23/23 78.1 kg (172 lb 1.9 oz)   03/02/23 79.4 kg (175 lb)   12/06/22 76.7 kg (169 lb)   10/06/22 77.1 kg (170 lb)   08/23/22 77.1 kg (170 lb)       Plan: patient     (B18.1) Hepatitis B carrier (H)  Comment: unsure if contributing to malaise, weakness, has refused previous treatment  Plan: may refer back to GI for endoscopy        56 minutes spent on the date of the encounter doing chart review, history and exam, documentation and further activities per the note       BMI:   Estimated body mass index is 39.27 kg/m  as calculated from the following:    Height as of this encounter: 1.41 m (4' 7.51\").    Weight as of this encounter: 78.1 kg (172 lb 1.9 oz).           No follow-ups on file.    Shannan Benson MD  [facility]    Cornelia Anne is a 67 year old accompanied by her , presenting for the following health issues:  RECHECK (Follow up B/P) and Forms (Form for [facility])      HPI     1. HTN: on the new medicine, not having issues    2. Elbow: much improved    3. Febrile illness after last visit and vaccines  -developed after last  -during night was woozy and while walking to bathroom, fell into laundry basket and with help of two children got up into bed  -slept all the day, no appetite with one episode of n/v, next day also slept didn't eat, \"was this an unusual reaction to vaccine?\"  -now feeling back to normal    4. DM: during this episode kids monitored bp and glucose, and reduced and stopped meal insulin and bp meds family checked sugars and monitored    5. Generally weak:  She feels this year has been weaker, less energy, doesn't do as much, now after talking with family wonders if low hemoglobin is contributing  -willing to consider some type of endoscopy or colonoscopy    Review of Systems   Denies palpitations, syncope, see HPI      Objective    /63   Pulse 56  " " Resp 16   Ht 1.41 m (4' 7.51\")   Wt 78.1 kg (172 lb 1.9 oz)   SpO2 99%   BMI 39.27 kg/m    Body mass index is 39.27 kg/m .  Physical Exam   GENERAL: healthy, alert and no distress  RESP: lungs clear to auscultation - no rales, rhonchi or wheezes  CV: bradycardia, normal S1 S2, no S3 or S4, no murmur, click or rub and no peripheral edema    Results from the last 24 hours No results found for this or any previous visit (from the past 24 hour(s)).              "

## 2023-04-20 ENCOUNTER — ANCILLARY PROCEDURE (OUTPATIENT)
Dept: MAMMOGRAPHY | Facility: CLINIC | Age: 68
End: 2023-04-20
Attending: FAMILY MEDICINE
Payer: COMMERCIAL

## 2023-04-20 ENCOUNTER — OFFICE VISIT (OUTPATIENT)
Dept: FAMILY MEDICINE | Facility: CLINIC | Age: 68
End: 2023-04-20
Payer: COMMERCIAL

## 2023-04-20 VITALS
HEART RATE: 63 BPM | RESPIRATION RATE: 20 BRPM | TEMPERATURE: 97 F | SYSTOLIC BLOOD PRESSURE: 136 MMHG | HEIGHT: 56 IN | WEIGHT: 173 LBS | BODY MASS INDEX: 38.92 KG/M2 | DIASTOLIC BLOOD PRESSURE: 72 MMHG

## 2023-04-20 DIAGNOSIS — R19.5 POSITIVE FIT (FECAL IMMUNOCHEMICAL TEST): ICD-10-CM

## 2023-04-20 DIAGNOSIS — I10 ESSENTIAL HYPERTENSION: Primary | ICD-10-CM

## 2023-04-20 DIAGNOSIS — Z12.31 ENCOUNTER FOR SCREENING MAMMOGRAM FOR BREAST CANCER: ICD-10-CM

## 2023-04-20 DIAGNOSIS — N18.32 STAGE 3B CHRONIC KIDNEY DISEASE (H): ICD-10-CM

## 2023-04-20 LAB
ANION GAP SERPL CALCULATED.3IONS-SCNC: 10 MMOL/L (ref 7–15)
BUN SERPL-MCNC: 45.6 MG/DL (ref 8–23)
CALCIUM SERPL-MCNC: 9 MG/DL (ref 8.8–10.2)
CHLORIDE SERPL-SCNC: 109 MMOL/L (ref 98–107)
CREAT SERPL-MCNC: 1.79 MG/DL (ref 0.51–0.95)
CREAT UR-MCNC: 80.8 MG/DL
DEPRECATED HCO3 PLAS-SCNC: 19 MMOL/L (ref 22–29)
GFR SERPL CREATININE-BSD FRML MDRD: 31 ML/MIN/1.73M2
GLUCOSE SERPL-MCNC: 119 MG/DL (ref 70–99)
MICROALBUMIN UR-MCNC: 296 MG/L
MICROALBUMIN/CREAT UR: 366.34 MG/G CR (ref 0–25)
POTASSIUM SERPL-SCNC: 4.6 MMOL/L (ref 3.4–5.3)
SODIUM SERPL-SCNC: 138 MMOL/L (ref 136–145)

## 2023-04-20 PROCEDURE — 77063 BREAST TOMOSYNTHESIS BI: CPT | Mod: TC | Performed by: RADIOLOGY

## 2023-04-20 PROCEDURE — 77067 SCR MAMMO BI INCL CAD: CPT | Mod: TC | Performed by: RADIOLOGY

## 2023-04-20 PROCEDURE — 36415 COLL VENOUS BLD VENIPUNCTURE: CPT | Performed by: FAMILY MEDICINE

## 2023-04-20 PROCEDURE — 99215 OFFICE O/P EST HI 40 MIN: CPT | Performed by: FAMILY MEDICINE

## 2023-04-20 PROCEDURE — 82570 ASSAY OF URINE CREATININE: CPT | Performed by: FAMILY MEDICINE

## 2023-04-20 PROCEDURE — 80048 BASIC METABOLIC PNL TOTAL CA: CPT | Performed by: FAMILY MEDICINE

## 2023-04-20 PROCEDURE — 82043 UR ALBUMIN QUANTITATIVE: CPT | Performed by: FAMILY MEDICINE

## 2023-04-20 NOTE — PATIENT INSTRUCTIONS
"Day before colonoscopy do not take \"HYDROCHLOROTHIAZIDE\" diuretic pill for blood pressure.    Day before colonoscopy only clear liquids and reduce meal insulin maybe 5- 10 units with drinking.  Day before colonoscopy the nighttime insulin will need to be reduced can reduce to 30 units.    Day of the colonoscopy in the morning do not take any pills by mouth, those can be restarted when home.      "

## 2023-04-20 NOTE — LETTER
April 24, 2023      Omid Adorno  [address] [place] [address]        Dear ,    We are writing to inform you of your test results.    Your test results fall within the expected range(s) or remain unchanged from previous results.  Please continue with current treatment plan.  Normal mammogram.  Repeat in 1 year.   Resulted Orders   MA Screen Bilateral w/Kalia    Narrative    BILATERAL FULL FIELD DIGITAL SCREENING MAMMOGRAM WITH TOMOSYNTHESIS    Performed on: 4/20/23    No comparisons were made when reading this study.    Technique:  This study was evaluated with the assistance of Computer-Aided   Detection.  Breast Tomosynthesis was used in interpretation.    Findings: The breasts have scattered areas of fibroglandular density.    There is no radiographic evidence of malignancy.     Impression    IMPRESSION: ACR BI-RADS Category 1: Negative    RECOMMENDED FOLLOW-UP: Annual routine screening mammogram    The results and recommendations of this examination will be communicated   to the patient.        Al Bunn MD         If you have any questions or concerns, please call the clinic at the number listed above.       Sincerely,      Shannan Benson MD

## 2023-04-20 NOTE — PROGRESS NOTES
"  Assessment & Plan     (I10) Essential hypertension  (primary encounter diagnosis)  Comment: stable, confirmed norvasc 10 mg and hydrochlorothiazide 25 and losartan 100 daily  Plan: continue current meds, and discussed holding diuretic day of prep and take meds after colonoscopy    (N18.32) Stage 3b chronic kidney disease (H)  Comment: await, bp and glucose control  Plan: Albumin Random Urine Quantitative with Creat         Ratio, BASIC METABOLIC PANEL            (R19.5) Positive FIT (fecal immunochemical test)  Comment: reviewed clear liquid diet before, prep and adjustments of meds  Plan: patient aware of change of diet, and will decrease levemir prep day and colonoscopy day.  Stated would need some but ok to reduce levemir to 30 units and also reduce novolog when only clear liquids likely 5 units.        40 minutes spent by me on the date of the encounter doing chart review, history and exam, documentation and further activities per the note, counseling on colonoscopy       BMI:   Estimated body mass index is 39.47 kg/m  as calculated from the following:    Height as of this encounter: 1.41 m (4' 7.51\").    Weight as of this encounter: 78.5 kg (173 lb).           No follow-ups on file.    Shannan Benson MD  [facility] is a 67 year old, presenting for the following health issues:  Hypertension         View : No data to display.              HPI     1. HTN:   Denies issues with meds and confirms now norvasc 10 mg daily    2. Colonoscopy: prep reviewed    3. Anemia: juts in general feels tired    4. Right metatarsal pain:  -just intermittent pains when walking on right foot (ball of the foot)  -denies injury or change in activity    Reviewed and instructed on prep        Review of Systems   Denies breathing, cardiac, urinary, GI issues currently      Objective    /72   Pulse 63   Temp 97  F (36.1  C) (Tympanic)   Resp 20   Ht 1.41 m (4' 7.51\")   Wt " 78.5 kg (173 lb)   BMI 39.47 kg/m    Body mass index is 39.47 kg/m .  Physical Exam   GENERAL: healthy, alert and no distress  RESP: lungs clear to auscultation - no rales, rhonchi or wheezes  CV: regular rate and rhythm, normal S1 S2, no S3 or S4, no murmur, click or rub, no peripheral edema and peripheral pulses strong  MS: Foot with no visible erythema, bruising, nontender on exam even at metatarsals and normal DP pulse, not warm  Slight edema of ankles    No results found for this or any previous visit (from the past 24 hour(s)).

## 2023-04-26 DIAGNOSIS — I10 ESSENTIAL HYPERTENSION: ICD-10-CM

## 2023-04-27 RX ORDER — LOSARTAN POTASSIUM 100 MG/1
100 TABLET ORAL DAILY
Qty: 90 TABLET | Refills: 1 | Status: SHIPPED | OUTPATIENT
Start: 2023-04-27 | End: 2023-10-23

## 2023-04-28 DIAGNOSIS — I10 ESSENTIAL HYPERTENSION: ICD-10-CM

## 2023-04-28 RX ORDER — HYDROCHLOROTHIAZIDE 25 MG/1
25 TABLET ORAL DAILY
Qty: 90 TABLET | Refills: 3 | Status: SHIPPED | OUTPATIENT
Start: 2023-04-28 | End: 2024-03-19

## 2023-05-09 DIAGNOSIS — K21.9 GASTROESOPHAGEAL REFLUX DISEASE WITHOUT ESOPHAGITIS: ICD-10-CM

## 2023-06-05 ENCOUNTER — ANESTHESIA EVENT (OUTPATIENT)
Dept: SURGERY | Facility: AMBULATORY SURGERY CENTER | Age: 68
End: 2023-06-05
Payer: COMMERCIAL

## 2023-06-05 ENCOUNTER — APPOINTMENT (OUTPATIENT)
Dept: INTERPRETER SERVICES | Facility: CLINIC | Age: 68
End: 2023-06-05
Payer: COMMERCIAL

## 2023-06-08 ENCOUNTER — HOSPITAL ENCOUNTER (OUTPATIENT)
Facility: AMBULATORY SURGERY CENTER | Age: 68
Discharge: HOME | End: 2023-06-08
Attending: FAMILY MEDICINE
Payer: COMMERCIAL

## 2023-06-08 ENCOUNTER — ANESTHESIA (OUTPATIENT)
Dept: SURGERY | Facility: AMBULATORY SURGERY CENTER | Age: 68
End: 2023-06-08
Payer: COMMERCIAL

## 2023-06-08 VITALS
OXYGEN SATURATION: 91 % | HEART RATE: 57 BPM | SYSTOLIC BLOOD PRESSURE: 141 MMHG | BODY MASS INDEX: 38.92 KG/M2 | RESPIRATION RATE: 16 BRPM | WEIGHT: 173 LBS | HEIGHT: 56 IN | TEMPERATURE: 97.3 F | DIASTOLIC BLOOD PRESSURE: 66 MMHG

## 2023-06-08 DIAGNOSIS — R19.5 POSITIVE FIT (FECAL IMMUNOCHEMICAL TEST): ICD-10-CM

## 2023-06-08 LAB
COLONOSCOPY: NORMAL
GLUCOSE BLDC GLUCOMTR-MCNC: 177 MG/DL (ref 70–99)

## 2023-06-08 PROCEDURE — 45385 COLONOSCOPY W/LESION REMOVAL: CPT | Performed by: FAMILY MEDICINE

## 2023-06-08 PROCEDURE — 45380 COLONOSCOPY AND BIOPSY: CPT | Mod: 59 | Performed by: FAMILY MEDICINE

## 2023-06-08 RX ORDER — PROPOFOL 10 MG/ML
INJECTION, EMULSION INTRAVENOUS CONTINUOUS PRN
Status: DISCONTINUED | OUTPATIENT
Start: 2023-06-08 | End: 2023-06-08

## 2023-06-08 RX ORDER — FENTANYL CITRATE 0.05 MG/ML
50 INJECTION, SOLUTION INTRAMUSCULAR; INTRAVENOUS EVERY 5 MIN PRN
Status: DISCONTINUED | OUTPATIENT
Start: 2023-06-08 | End: 2023-06-09 | Stop reason: HOSPADM

## 2023-06-08 RX ORDER — LIDOCAINE 40 MG/G
CREAM TOPICAL
Status: DISCONTINUED | OUTPATIENT
Start: 2023-06-08 | End: 2023-06-09 | Stop reason: HOSPADM

## 2023-06-08 RX ORDER — SODIUM CHLORIDE, SODIUM LACTATE, POTASSIUM CHLORIDE, CALCIUM CHLORIDE 600; 310; 30; 20 MG/100ML; MG/100ML; MG/100ML; MG/100ML
INJECTION, SOLUTION INTRAVENOUS CONTINUOUS
Status: DISCONTINUED | OUTPATIENT
Start: 2023-06-08 | End: 2023-06-09 | Stop reason: HOSPADM

## 2023-06-08 RX ORDER — PROPOFOL 10 MG/ML
INJECTION, EMULSION INTRAVENOUS PRN
Status: DISCONTINUED | OUTPATIENT
Start: 2023-06-08 | End: 2023-06-08

## 2023-06-08 RX ORDER — ONDANSETRON 4 MG/1
4 TABLET, ORALLY DISINTEGRATING ORAL EVERY 30 MIN PRN
Status: DISCONTINUED | OUTPATIENT
Start: 2023-06-08 | End: 2023-06-09 | Stop reason: HOSPADM

## 2023-06-08 RX ORDER — FENTANYL CITRATE 0.05 MG/ML
25 INJECTION, SOLUTION INTRAMUSCULAR; INTRAVENOUS EVERY 5 MIN PRN
Status: DISCONTINUED | OUTPATIENT
Start: 2023-06-08 | End: 2023-06-09 | Stop reason: HOSPADM

## 2023-06-08 RX ORDER — LIDOCAINE HYDROCHLORIDE 20 MG/ML
INJECTION, SOLUTION INFILTRATION; PERINEURAL PRN
Status: DISCONTINUED | OUTPATIENT
Start: 2023-06-08 | End: 2023-06-08

## 2023-06-08 RX ORDER — HYDROMORPHONE HCL IN WATER/PF 6 MG/30 ML
0.2 PATIENT CONTROLLED ANALGESIA SYRINGE INTRAVENOUS EVERY 5 MIN PRN
Status: DISCONTINUED | OUTPATIENT
Start: 2023-06-08 | End: 2023-06-09 | Stop reason: HOSPADM

## 2023-06-08 RX ORDER — ONDANSETRON 2 MG/ML
INJECTION INTRAMUSCULAR; INTRAVENOUS PRN
Status: DISCONTINUED | OUTPATIENT
Start: 2023-06-08 | End: 2023-06-08

## 2023-06-08 RX ORDER — ONDANSETRON 2 MG/ML
4 INJECTION INTRAMUSCULAR; INTRAVENOUS EVERY 30 MIN PRN
Status: DISCONTINUED | OUTPATIENT
Start: 2023-06-08 | End: 2023-06-09 | Stop reason: HOSPADM

## 2023-06-08 RX ORDER — GLYCOPYRROLATE 0.2 MG/ML
INJECTION, SOLUTION INTRAMUSCULAR; INTRAVENOUS PRN
Status: DISCONTINUED | OUTPATIENT
Start: 2023-06-08 | End: 2023-06-08

## 2023-06-08 RX ORDER — OXYCODONE HYDROCHLORIDE 5 MG/1
5 TABLET ORAL
Status: DISCONTINUED | OUTPATIENT
Start: 2023-06-08 | End: 2023-06-09 | Stop reason: HOSPADM

## 2023-06-08 RX ORDER — HYDROMORPHONE HCL IN WATER/PF 6 MG/30 ML
0.4 PATIENT CONTROLLED ANALGESIA SYRINGE INTRAVENOUS EVERY 5 MIN PRN
Status: DISCONTINUED | OUTPATIENT
Start: 2023-06-08 | End: 2023-06-09 | Stop reason: HOSPADM

## 2023-06-08 RX ORDER — OXYCODONE HYDROCHLORIDE 10 MG/1
10 TABLET ORAL
Status: DISCONTINUED | OUTPATIENT
Start: 2023-06-08 | End: 2023-06-09 | Stop reason: HOSPADM

## 2023-06-08 RX ADMIN — LIDOCAINE HYDROCHLORIDE 3 ML: 20 INJECTION, SOLUTION INFILTRATION; PERINEURAL at 09:12

## 2023-06-08 RX ADMIN — GLYCOPYRROLATE 0.2 MG: 0.2 INJECTION, SOLUTION INTRAMUSCULAR; INTRAVENOUS at 09:23

## 2023-06-08 RX ADMIN — PROPOFOL 20 MG: 10 INJECTION, EMULSION INTRAVENOUS at 09:28

## 2023-06-08 RX ADMIN — ONDANSETRON 4 MG: 2 INJECTION INTRAMUSCULAR; INTRAVENOUS at 09:12

## 2023-06-08 RX ADMIN — LIDOCAINE HYDROCHLORIDE 2 ML: 20 INJECTION, SOLUTION INFILTRATION; PERINEURAL at 09:20

## 2023-06-08 RX ADMIN — SODIUM CHLORIDE, SODIUM LACTATE, POTASSIUM CHLORIDE, CALCIUM CHLORIDE: 600; 310; 30; 20 INJECTION, SOLUTION INTRAVENOUS at 08:42

## 2023-06-08 RX ADMIN — PROPOFOL 200 MCG/KG/MIN: 10 INJECTION, EMULSION INTRAVENOUS at 09:12

## 2023-06-08 RX ADMIN — PROPOFOL 40 MG: 10 INJECTION, EMULSION INTRAVENOUS at 09:12

## 2023-06-08 NOTE — ANESTHESIA CARE TRANSFER NOTE
Patient: Omid Adorno    Procedure: Procedure(s):  COLONOSCOPY WITH POLYPECTOMY       Diagnosis: Positive FIT (fecal immunochemical test) [R19.5]  Diagnosis Additional Information: No value filed.    Anesthesia Type:   MAC     Note:  Anesthesia Care Transfer Notewriter  Vitals:  Vitals Value Taken Time   /56 06/08/23 0958   Temp 97.3  F (36.3  C) 06/08/23 0958   Pulse 49 06/08/23 0958   Resp 16 06/08/23 0958   SpO2 93 % 06/08/23 0958       Electronically Signed By: RENETTA Hightower CRNA  June 8, 2023  10:01 AM

## 2023-06-08 NOTE — ANESTHESIA POSTPROCEDURE EVALUATION
Patient: Omid Adorno    Procedure: Procedure(s):  COLONOSCOPY WITH POLYPECTOMY       Anesthesia Type:  MAC    Note:  Disposition: Outpatient   Postop Pain Control: Uneventful            Sign Out: Well controlled pain   PONV: No   Neuro/Psych: Uneventful            Sign Out: Acceptable/Baseline neuro status   Airway/Respiratory: Uneventful            Sign Out: Acceptable/Baseline resp. status   CV/Hemodynamics: Uneventful            Sign Out: Acceptable CV status; No obvious hypovolemia; No obvious fluid overload   Other NRE: NONE   DID A NON-ROUTINE EVENT OCCUR? No           Last vitals:  Vitals Value Taken Time   /66 06/08/23 1020   Temp 97.3  F (36.3  C) 06/08/23 0958   Pulse 57 06/08/23 1023   Resp 16 06/08/23 1020   SpO2 98 % 06/08/23 1023   Vitals shown include unvalidated device data.    Electronically Signed By: Naheed Steen MD  June 8, 2023  10:47 AM

## 2023-06-08 NOTE — LETTER
23      Omid Adorno   [address] [place] [address]    : 1955    Omid Adorno,    The results from your colonoscopy showed several precancerous polyps. Based on these results, current guidelines recommend repeat exam in 3 years for surveillance colonoscopy (in the year ).    Thank you coming to see me for your colonoscopy. Please let me know if there is any further information that you need.    Dr. Ginny Gross III, MD, FAAFP  Faculty Physician, [facility] Medicine Residency    Department of Family Medicine and Community Health  [employer] [facility] [employer]    Office: [phone]

## 2023-06-08 NOTE — ANESTHESIA CARE TRANSFER NOTE
Patient: Omid Adorno    Procedure: Procedure(s):  COLONOSCOPY WITH POLYPECTOMY       Diagnosis: Positive FIT (fecal immunochemical test) [R19.5]  Diagnosis Additional Information: No value filed.    Anesthesia Type:   MAC     Note:    Oropharynx: oropharynx clear of all foreign objects and spontaneously breathing  Level of Consciousness: awake  Oxygen Supplementation: room air    Independent Airway: airway patency satisfactory and stable  Dentition: dentition unchanged  Vital Signs Stable: post-procedure vital signs reviewed and stable  Report to RN Given: handoff report given  Patient transferred to: Phase II    Handoff Report: Identifed the Patient, Identified the Reponsible Provider, Reviewed the pertinent medical history, Discussed the surgical course, Reviewed Intra-OP anesthesia mangement and issues during anesthesia, Set expectations for post-procedure period and Allowed opportunity for questions and acknowledgement of understanding      Vitals:  Vitals Value Taken Time   /56 06/08/23 0958   Temp 97.3  F (36.3  C) 06/08/23 0958   Pulse 49 06/08/23 0958   Resp 16 06/08/23 0958   SpO2 93 % 06/08/23 0958       Electronically Signed By: RENETTA Hightower CRNA  June 8, 2023  10:02 AM

## 2023-06-08 NOTE — ANESTHESIA PREPROCEDURE EVALUATION
Anesthesia Pre-Procedure Evaluation    Patient: Omid Adorno   MRN: [id number] : 1955        Procedure : Procedure(s):  COLONOSCOPY          Past Medical History:   Diagnosis Date     Chronic kidney disease, stage III (moderate) (H) 2012     Difficulty walking      Hepatitis B carrier (H) 2012     Latent tuberculosis, possible primary in the ? 2019    1993 Treated at [place]: monitored by [facility] for 6 months -repeated cxr and sputums   exposure to TB .  Declined repeat TB treatment and on CXR monitoring.  Reason for call: FYI: The patient was re expose 18, recommend 2 years of active monitoring that consist of every 6 month being evaluated by a doctor with a  Symptoms screen and chest xray. The Patient came to their      Major depressive disorder, recurrent episode, unspecified 2012     Obesity, unspecified 2012     Pure hypercholesterolemia 2012     Rheumatoid arthritis involving both hands with positive rheumatoid factor (H) 2016     Rheumatoid arthritis(714.0) 2012     Type II or unspecified type diabetes mellitus with unspecified complication, uncontrolled 2012    Onset date:       Unspecified essential hypertension 2012     Unspecified glaucoma(365.9) 2012     Walking troubles       Past Surgical History:   Procedure Laterality Date     NO HISTORY OF SURGERY        Allergies   Allergen Reactions     Lisinopril Cough      Social History     Tobacco Use     Smoking status: Never     Smokeless tobacco: Never     Tobacco comments:     no exposure at home per pt   Vaping Use     Vaping status: Not on file   Substance Use Topics     Alcohol use: No     Alcohol/week: 0.0 standard drinks of alcohol      Wt Readings from Last 1 Encounters:   23 78.5 kg (173 lb)        Anesthesia Evaluation            ROS/MED HX  ENT/Pulmonary:     (+) SERINA risk factors, obese,     Neurologic:  - neg neurologic ROS      Cardiovascular:     (+) hypertension-----    METS/Exercise Tolerance:     Hematologic:       Musculoskeletal:       GI/Hepatic: Comment: Chronic hep B    (+) hepatitis type B,     Renal/Genitourinary:     (+) renal disease, type: CRI,     Endo:     (+) type II DM, Obesity,     Psychiatric/Substance Use:       Infectious Disease:       Malignancy:       Other:            Physical Exam    Airway        Mallampati: III   TM distance: > 3 FB   Neck ROM: full     Respiratory Devices and Support         Dental       (+) Multiple visibly decayed, broken teeth and Removable bridges or other hardware      Cardiovascular   cardiovascular exam normal          Pulmonary   pulmonary exam normal                OUTSIDE LABS:  CBC:   Lab Results   Component Value Date    HGB 10.5 (L) 03/02/2023    HGB 13.1 12/24/2021     BMP:   Lab Results   Component Value Date     04/20/2023     05/03/2022    POTASSIUM 4.6 04/20/2023    POTASSIUM 4.3 05/03/2022    CHLORIDE 109 (H) 04/20/2023    CHLORIDE 113 (H) 05/03/2022    CO2 19 (L) 04/20/2023    CO2 20 (L) 05/03/2022    BUN 45.6 (H) 04/20/2023    BUN 54 (H) 05/03/2022    CR 1.79 (H) 04/20/2023    CR 1.55 (H) 05/03/2022     (H) 04/20/2023    GLC 72 05/03/2022     COAGS: No results found for: PTT, INR, FIBR  POC: No results found for: BGM, HCG, HCGS  HEPATIC:   Lab Results   Component Value Date    ALBUMIN 3.2 (L) 11/10/2020    PROTTOTAL 7.0 11/10/2020    ALT 24.0 02/22/2013    AST 88.0 (H) 02/22/2013    ALKPHOS 186 (H) 03/02/2023    BILITOTAL 0.3 11/10/2020    BILIDIRECT 0.1 11/10/2020     OTHER:   Lab Results   Component Value Date    A1C 7.5 (H) 03/02/2023    ROOSEVELT 9.0 04/20/2023    PHOS 4.0 03/02/2023    T4 0.9 10/24/2019    SED 46 (H) 07/24/2012       Anesthesia Plan    ASA Status:  3      Anesthesia Type: MAC.              Consents    Anesthesia Plan(s) and associated risks, benefits, and realistic alternatives discussed. Questions answered and patient/representative(s)  expressed understanding.    - Discussed:     - Discussed with:  Patient      - Patient is DNR/DNI Status: No         Postoperative Care       PONV prophylaxis: Ondansetron (or other 5HT-3)     Comments:                Naheed Steen MD

## 2023-06-08 NOTE — DISCHARGE INSTRUCTIONS
Colonoscopy Discharge Instructions  When You Leave Today:  The medications you received today may affect your short-term memory, balance/coordination, and reflexes. It may cause drowsiness, slow reflexes, and impaired thoughts. We recommend that you go home and rest for the remainder of the day. Do NOT DRIVE, go back to work or do anything that requires a clear thought process. Examples include but are not limited to: Do NOT drive, operate any machines, make important personal or work decisions, or sign legal documentation for 24 hours.      You may feel bloated because of the air that was introduced during the exam. Flatulence and belching is expected. If you feel like the air isn't coming out easily you can try laying on your right side to help the air move in the correct direction. Holding in the air can cause abdominal pain, cramping, and in some cases nausea. Let the gas pass!!    You may eat and drink what you can tolerate after your exam.  We recommend that you start with something light and progress as tolerated. You will be gassy, so avoiding things that create more gas is beneficial. This includes things like beans, carbonated beverages, and whole vegetables like broccoli/brussel sprouts/cabbage.     AVOID alcohol for 24 hours. This could have an adverse reaction mixed with the sedative.       You may resume your normal home medications unless told otherwise. Do not double up on any missed doses.        Avoid Aspirin and Aspirin containing products for 3 days if biopsies were taken. This will allow the areas to heal and will decrease your risk of bleeding.      Watch the area where your IV was inserted. If there is any swelling, severe pain or the area feels hot, place a warm, wet cloth over the area for 10-20 minutes. A small bruise is normal. If you continue to have discomfort, contact your doctor.      If you use a CPAP at home, please use it for the next 24-48 hours.    If any of the following  occur, please go to your CLOSEST EMERGENCY ROOM.    Increasing abdominal pain (if you are unable to pass gas or doubled over in pain)  Fever of 101.5 degrees or higher  Bleeding (a small amount of bleeding is normal when wiping if you have hemorrhoids or we remove tissue samples. However if you are filling the toilet with bright red blood, this is an emergency)    Call the [facility] [phone] if you have any questions or concerns regarding your procedure.  The [facility] is open from 8am to 5pm Monday through Friday.  DO not come to the clinic for severe post procedure complications, go to your closest ER.     Colonoscopy:    X Biopsies Taken    Normal- Follow up__________________________ (unless condition changes or treatment guidelines change)   X Diverticulosis   X Hemorrhoids X Internal  External   X High Fiber Diet           Other _______________________________________________________________    If biopsies were taken:  A letter will be mailed to you with your biopsy results and further recommendations in approximately   1-2 weeks.  If you have not received a letter within 3 weeks, please call the [facility] [phone].

## 2023-06-08 NOTE — OP NOTE
COLONOSCOPY OPERATION REPORT     OPERATION PERFORMED:  Colonoscopy with MAC  DATE OF OPERATION: 8 June 2023  SURGEON(S): Maunel Gross III, MD, FAAFP    ASSISTANT(S): Reid Mckeon MD    Preoperative Diagnosis: Positive FIT    Postoperative Diagnosis: Polyps of the Colon, diverticulosis, internal hemorrhoids    History:    67yof here for colonoscopy after refusing exam for 7 years after several positive FIT tests. Denies symptoms. No family history of colon cancer.        Shortly Before Procedure  Time out was completed. Correct patient ID, procedure verified, positioning verified, implants/equipment available, studies available as needed. Consents signed and on the chart. Emergency resuscitation equipment available if needed. TYPE OF ANESTHESIA:  MAC. Patient monitored in the usual fashion with pulse ox, NIBP, and EKG. See flowsheet for full details.      DESCRIPTION OF OPERATION:  Assuring patient comfort, a rectal exam revealed normal sphincter tone, no masses, no stool in the rectal vault. The video colonoscope was passed from anus to cecum under direct visualization with expected amount of difficulty. The cecum was identified by the ileocecal valve, appendiceal orifice, and crows foot. Mucosa was inspected > 6 minute scope withdrawal.         Findings   Prep:    Excellent   EBL:                     < 5 mL   Terminal Ileum: Intubation Not Performed   Cecum:                     Single polyp < 1cm completely removed by cold snare. Mild diverticulosis.   Ascending Colon:    Two polyps <5mm removed by cold bx forceps.       Transverse Colon:   Normal appearance.        Desc. Colon: Three polyps <5mm removed by cold bx forceps.       Sigmoid Colon:        Single polyp <5mm removed by cold bx forceps.  Mild diverticulosis.   Rectum:                    Moderate internal hemorrhoids       Complications: None.     Polyps submitted:                     7   Diverticulosis:    None   Internal Hemorrhoids: None    External Hemorrhoids: None       TIMES time  min   PROCEDURE START 0916 TOTAL PROCEDURE TIME 37   CECUM REACHED 0922 TIME TO CECUM 6   PROCEDURE STOP 0953 WITHDRAWAL TIME 31       DISPOSITION   Follow up/Repeat Colonoscopy:                     Pending pathology review.             Manuel Gross III, MD, FAAFP

## 2023-06-12 ENCOUNTER — APPOINTMENT (OUTPATIENT)
Dept: INTERPRETER SERVICES | Facility: CLINIC | Age: 68
End: 2023-06-12
Payer: COMMERCIAL

## 2023-06-12 NOTE — PROGRESS NOTES
23    RE: OMID ADORNO, : 1955    Shannan Benson    I have performed the colonoscopy on Omid Adorno and found multiple tubular adenomas. Based on the pathology, current guidelines recommend repeat exam in 3 years for surveillance colonoscopy (in the year ).    Your patient has been informed of the results and recommended follow up plan.    Thank you for the referral. Please let me know if there is any further information that you need.    Very Respectfully,    Silvestre Gross III, MD, FAAFP  Faculty Physician, [facility] Medicine Residency    Department of Family Medicine and Critical access hospital  [employer] [employer] [facility] [employer]    Office: [phone]

## 2023-06-23 DIAGNOSIS — Z79.4 TYPE 2 DIABETES MELLITUS WITH DIABETIC POLYNEUROPATHY, WITH LONG-TERM CURRENT USE OF INSULIN (H): ICD-10-CM

## 2023-06-23 DIAGNOSIS — E11.42 TYPE 2 DIABETES MELLITUS WITH DIABETIC POLYNEUROPATHY, WITH LONG-TERM CURRENT USE OF INSULIN (H): ICD-10-CM

## 2023-06-23 RX ORDER — ASPIRIN 81 MG/1
81 TABLET, COATED ORAL DAILY
Qty: 90 TABLET | Refills: 3 | Status: SHIPPED | OUTPATIENT
Start: 2023-06-23 | End: 2024-06-24

## 2023-06-27 ENCOUNTER — OFFICE VISIT (OUTPATIENT)
Dept: FAMILY MEDICINE | Facility: CLINIC | Age: 68
End: 2023-06-27
Payer: COMMERCIAL

## 2023-06-27 VITALS
DIASTOLIC BLOOD PRESSURE: 76 MMHG | RESPIRATION RATE: 18 BRPM | SYSTOLIC BLOOD PRESSURE: 149 MMHG | HEART RATE: 64 BPM | OXYGEN SATURATION: 97 %

## 2023-06-27 DIAGNOSIS — R05.2 SUBACUTE COUGH: Primary | ICD-10-CM

## 2023-06-27 PROCEDURE — 99213 OFFICE O/P EST LOW 20 MIN: CPT | Mod: GC | Performed by: STUDENT IN AN ORGANIZED HEALTH CARE EDUCATION/TRAINING PROGRAM

## 2023-06-27 RX ORDER — FLUTICASONE PROPIONATE 50 MCG
1 SPRAY, SUSPENSION (ML) NASAL DAILY
Qty: 18.2 ML | Refills: 0 | Status: SHIPPED | OUTPATIENT
Start: 2023-06-27 | End: 2023-08-24

## 2023-06-27 NOTE — PROGRESS NOTES
Preceptor Attestation:   Patient seen, evaluated and discussed with the resident Dr. Evans I have verified the content of the note, which accurately reflects my assessment of the patient and the plan of care.  Supervising Physician:Benjamin Rosenstein, MD, MA [facility]

## 2023-06-27 NOTE — PROGRESS NOTES
CHIEF COMPLAINT                                                      Chief Complaint   Patient presents with     Cough     Cough for couple weeks and when cough have chest pain        ASSESSMENT/PLAN:     (R05.2) Subacute cough  (primary encounter diagnosis)  Comment: Sx consistent with post nasal drip vs post viral cough vs GERD. No stigmata of CHF.  Plan:   -fluticasone (FLONASE) 50 MCG/ACT nasal spray  -f/u 2 weeks; consider GERD tx if no better        Options for treatment and follow-up care were reviewed with the patient and/or guardian. Omid Adorno and/or guardian engaged in the decision making process and verbalized understanding of the options discussed and agreed with the final plan    Precepted with Dr. Rosenstein.    Yannick Evans MD - PGY3  [facility]      SUBJECTIVE:                                                    Omid Adorno is a 67 year old year old female who presents to clinic today for the following health issues:    Cough  X 2 weeks  Dry cough  Started with sore throat  Associated with SOB with walking  Did notice some leg swelling last week  Denies orthopnea  No sick contacts; children at home are healthy  No COVID exposures known  Never smoked  No hx asthma  No fever/chills  Denies GERD sx  Denies runny nose or sinus congestion  Sometimes has runny nose, doesn't think she has seasonal allergies. Doesn't get itchy eyes    ----------------------------------------------------------------------------------------------------------------------  Patient Active Problem List   Diagnosis     Health Care Home     Hepatitis B carrier (H)     Stage 3b chronic kidney disease (H)     Pure hypercholesterolemia     Essential hypertension     Obesity     Vitamin D deficiency     Major depressive disorder, recurrent episode, moderate (H)     Dyspepsia     Glaucoma of both eyes associated with underlying disease     Other lymphedema     Type 2 diabetes mellitus with both eyes affected by  mild nonproliferative retinopathy without macular edema, with long-term current use of insulin (H)     Chronic hepatitis B virus infection (H)     Morbid obesity (H)     Sinus bradycardia     Positive FIT (fecal immunochemical test)     Past Surgical History:   Procedure Laterality Date     COLONOSCOPY N/A 6/8/2023    Procedure: COLONOSCOPY WITH POLYPECTOMY;  Surgeon: Manuel Gross MD;  Location: [facility]     NO HISTORY OF SURGERY         Social History     Tobacco Use     Smoking status: Never     Smokeless tobacco: Never     Tobacco comments:     no exposure at home per pt   Substance Use Topics     Alcohol use: No     Alcohol/week: 0.0 standard drinks of alcohol     Family History   Problem Relation Age of Onset     No Known Problems Mother      No Known Problems Father      No Known Problems Sister      No Known Problems Daughter      No Known Problems Maternal Grandmother      No Known Problems Maternal Grandfather      No Known Problems Paternal Grandmother      No Known Problems Paternal Grandfather      No Known Problems Maternal Aunt      No Known Problems Paternal Aunt      Cancer No family hx of      Diabetes No family hx of      Cardiovascular No family hx of      Coronary Artery Disease No family hx of      Cerebrovascular Disease No family hx of      Breast Cancer No family hx of      Colon Cancer No family hx of      Other Cancer No family hx of      Prostate Cancer No family hx of      Hypertension No family hx of      Asthma No family hx of      Hereditary Breast and Ovarian Cancer Syndrome No family hx of      Endometrial Cancer No family hx of      Ovarian Cancer No family hx of          Problem list and past medical, surgical, social, and family histories reviewed & adjusted, as indicated.    Current Outpatient Medications   Medication Sig Dispense Refill     fluticasone (FLONASE) 50 MCG/ACT nasal spray Spray 1 spray into both nostrils daily 18.2 mL 0     acetaminophen (TYLENOL) 325  MG tablet Take 2 tablets (650 mg) by mouth 4 times daily 100 tablet 3     amLODIPine (NORVASC) 10 MG tablet Take 1 tablet (10 mg) by mouth every morning 90 tablet 3     ammonium lactate (AMLACTIN) 12 % external cream Apply topically 2 times daily 140 g 3     ASPIRIN LOW DOSE 81 MG EC tablet Take 1 tablet (81 mg) by mouth daily 90 tablet 3     bacitracin 500 UNIT/GM external ointment APPLY 2 GRAMS TO THE LEFT FOOT DAILY FOR 1 WEEK AS DIRECTED./PLEEV 2 GRAMS TXHUA HNUB AMBREEN 1 ASTHIV RAWS LI QHIA       benzonatate (TESSALON) 200 MG capsule Take 1 capsule (200 mg) by mouth 3 times daily as needed for cough 42 capsule 0     blood glucose (ACCU-CHEK CHIKA) test strip 360 strips by In Vitro route 4 times daily (before meals and nightly) Use to test blood sugar 3 times daily or as directed. 300 strip 4     blood glucose monitoring (ACCU-CHEK MULTICLIX) lancets Use to test blood sugar 3 times daily or as directed. 100 each 3     diclofenac (VOLTAREN) 1 % topical gel        glucose (BD GLUCOSE) 4 g chewable tablet Take 1 tablet by mouth every hour as needed for low blood sugar 10 tablet 4     hydrochlorothiazide (HYDRODIURIL) 25 MG tablet Take 1 tablet (25 mg) by mouth daily 90 tablet 3     insulin aspart (NOVOLOG PEN) 100 UNIT/ML pen 20-22 units with meals, 10 units with late meal or small meal 45 mL 3     insulin detemir (LEVEMIR PEN) 100 UNIT/ML pen Inject 48 Units Subcutaneous At Bedtime 15 mL 3     insulin pen needle (31G X 8 MM) 31G X 8 MM miscellaneous Use for insulin injections four times daily (with meals and bedtime) 360 each 0     latanoprost (XALATAN) 0.005 % ophthalmic solution Apply 1 drop to eye daily Instill  1 drop into the affected eye(s) once daily as directed 2.5 mL 3     liraglutide (VICTOZA) 18 MG/3ML solution Inject 1.8 mg Subcutaneous daily 27 mL 3     losartan (COZAAR) 100 MG tablet Take 1 tablet (100 mg) by mouth daily 90 tablet 1     omeprazole (PRILOSEC) 20 MG DR capsule Take 1 capsule (20 mg) by  mouth daily 90 capsule 3     order for DME Equipment being ordered:     Massage tube, freeze and use to massage heel twice daily 1 Device 0     pioglitazone (ACTOS) 15 MG tablet Take 1 tablet (15 mg) by mouth daily 90 tablet 3     rosuvastatin (CRESTOR) 10 MG tablet Take 1 tablet (10 mg) by mouth daily 90 tablet 3     VITAMIN D3 25 MCG (1000 UT) tablet Take 1 tablet (25 mcg) by mouth daily 90 tablet 3     Medication list reviewed and updated as indicated.    Allergies   Allergen Reactions     Lisinopril Cough     Allergies reviewed and updated as indicated.  ----------------------------------------------------------------------------------------------------------------------  ROS:  Constitutional, HEENT, cardiovascular, pulmonary, GI, musculoskeletal, neuro, skin, and psych systems are negative, except as otherwise noted.    OBJECTIVE:     BP (!) 149/76   Pulse 64   Resp 18   SpO2 97%   There is no height or weight on file to calculate BMI.  Exam:  Constitutional: healthy, alert and no distress  Head: Normocephalic. Atraumatic  Neck: Neck supple. FROM  Mouth: Tongue with yellow film, diffuse poor dentition and carries. Uvula midline. Unable to visualize posterior oropharynx  Nose: Edematous and erythematous turbinates b/l  Cardiovascular: Acyanotic. No LE edema  Respiratory: Normal chest rise. Non-labored breathing. LCTAB  Musculoskeletal: extremities normal- no gross deformities noted, gait normal and normal muscle tone  Skin: no suspicious lesions or rashes  Neurologic: Gait normal. CN II-XII grossly intact  Psychiatric: mentation appears normal and affect normal/bright

## 2023-06-28 DIAGNOSIS — I10 ESSENTIAL HYPERTENSION: ICD-10-CM

## 2023-06-28 RX ORDER — AMLODIPINE BESYLATE 10 MG/1
10 TABLET ORAL EVERY MORNING
Qty: 90 TABLET | Refills: 3 | Status: SHIPPED | OUTPATIENT
Start: 2023-06-28 | End: 2024-07-29

## 2023-07-11 NOTE — PROGRESS NOTES
"  Assessment & Plan   (E11.3293,  Z79.4) Type 2 diabetes mellitus with both eyes affected by mild nonproliferative retinopathy without macular edema, with long-term current use of insulin (H)  (primary encounter diagnosis)  Comment: at goal, possibly some low sugars, will reduce novolog with meals   Plan: Hemoglobin A1c        novolog 18 units with larger meal, no change to bedtime insulin    (N18.32) Stage 3b chronic kidney disease (H)  Comment: monitoring and adjusting meds as   Plan: Basic metabolic panel        Close follow up of meds    (D64.9) Anemia, unspecified type  Comment: recheck, has now had polyps removed  Plan: Hemoglobin            (E11.42,  Z79.4) Type 2 diabetes mellitus with diabetic polyneuropathy, with long-term current use of insulin (H)  Comment: see above, change insulin for meals  Plan: insulin aspart (NOVOLOG PEN) 100 UNIT/ML pen        No change to levemir    (H42) Glaucoma of both eyes associated with underlying disease  Comment: refilled  Plan: latanoprost (XALATAN) 0.005 % ophthalmic         solution            (M05.741,  M05.742) Rheumatoid arthritis involving both hands with positive rheumatoid factor (H)  Comment: agree with refill  Plan: acetaminophen (TYLENOL) 325 MG tablet            33 minutes spent by me on the date of the encounter doing chart review, history and exam, documentation and further activities per the note, counseling on colonoscopy       BMI:   Estimated body mass index is 38.96 kg/m  as calculated from the following:    Height as of this encounter: 1.41 m (4' 7.51\").    Weight as of this encounter: 77.5 kg (170 lb 12 oz).           No follow-ups on file.    Shannan Benson MD  [facility] Anne is a 67 year old, presenting for the following health issues:  Follow Up (Follow up diabetes)         View : No data to display.              HPI     1. HTN:   Denies issues with meds and confirms now norvasc 10 mg " "daily  -checking at home and 130-140    2. Colonoscopy: results reviewed, patient understands repeat needed in 3 years, overall tolerated procedure, bowels fine not noticing bleeding, issues    3. Anemia:     4. DM:  -does have some lows to 50 or 60, has had a reduced appetite, afternoon/evening, every couple  Wt Readings from Last 5 Encounters:   07/12/23 77.5 kg (170 lb 12 oz)   06/05/23 78.5 kg (173 lb)   04/20/23 78.5 kg (173 lb)   03/23/23 78.1 kg (172 lb 1.9 oz)   03/02/23 79.4 kg (175 lb)         Review of Systems   Some foot swelling, some cough,       Objective    /68   Pulse 58   Temp 97.8  F (36.6  C) (Oral)   Ht 1.41 m (4' 7.51\")   Wt 77.5 kg (170 lb 12 oz)   SpO2 95%   BMI 38.96 kg/m    Body mass index is 38.96 kg/m .  Physical Exam   GENERAL: healthy, alert and no distress  RESP: lungs clear to auscultation - no rales, rhonchi or wheezes  CV: regular rate and rhythm, normal S1 S2, no S3 or S4, no murmur, click or rub, no peripheral edema and peripheral pulses strong  MS: Foot with no visible erythema, bruising, nontender on exam even at metatarsals and normal DP pulse, not warm  Slight edema of ankles    Results for orders placed or performed in visit on 07/12/23 (from the past 24 hour(s))   Hemoglobin A1c   Result Value Ref Range    Hemoglobin A1C 6.7 (H) 0.0 - 5.6 %   Hemoglobin   Result Value Ref Range    Hemoglobin 11.0 (L) 11.7 - 15.7 g/dL                   "

## 2023-07-12 ENCOUNTER — OFFICE VISIT (OUTPATIENT)
Dept: FAMILY MEDICINE | Facility: CLINIC | Age: 68
End: 2023-07-12
Payer: COMMERCIAL

## 2023-07-12 VITALS
SYSTOLIC BLOOD PRESSURE: 137 MMHG | DIASTOLIC BLOOD PRESSURE: 68 MMHG | HEART RATE: 58 BPM | OXYGEN SATURATION: 95 % | WEIGHT: 170.75 LBS | HEIGHT: 56 IN | TEMPERATURE: 97.8 F | BODY MASS INDEX: 38.41 KG/M2

## 2023-07-12 DIAGNOSIS — Z79.4 TYPE 2 DIABETES MELLITUS WITH BOTH EYES AFFECTED BY MILD NONPROLIFERATIVE RETINOPATHY WITHOUT MACULAR EDEMA, WITH LONG-TERM CURRENT USE OF INSULIN (H): Primary | ICD-10-CM

## 2023-07-12 DIAGNOSIS — D64.9 ANEMIA, UNSPECIFIED TYPE: ICD-10-CM

## 2023-07-12 DIAGNOSIS — E11.3293 TYPE 2 DIABETES MELLITUS WITH BOTH EYES AFFECTED BY MILD NONPROLIFERATIVE RETINOPATHY WITHOUT MACULAR EDEMA, WITH LONG-TERM CURRENT USE OF INSULIN (H): Primary | ICD-10-CM

## 2023-07-12 DIAGNOSIS — H42 GLAUCOMA OF BOTH EYES ASSOCIATED WITH UNDERLYING DISEASE: ICD-10-CM

## 2023-07-12 DIAGNOSIS — E11.42 TYPE 2 DIABETES MELLITUS WITH DIABETIC POLYNEUROPATHY, WITH LONG-TERM CURRENT USE OF INSULIN (H): ICD-10-CM

## 2023-07-12 DIAGNOSIS — Z79.4 TYPE 2 DIABETES MELLITUS WITH DIABETIC POLYNEUROPATHY, WITH LONG-TERM CURRENT USE OF INSULIN (H): ICD-10-CM

## 2023-07-12 DIAGNOSIS — M05.741 RHEUMATOID ARTHRITIS INVOLVING BOTH HANDS WITH POSITIVE RHEUMATOID FACTOR (H): ICD-10-CM

## 2023-07-12 DIAGNOSIS — M05.742 RHEUMATOID ARTHRITIS INVOLVING BOTH HANDS WITH POSITIVE RHEUMATOID FACTOR (H): ICD-10-CM

## 2023-07-12 DIAGNOSIS — N18.32 STAGE 3B CHRONIC KIDNEY DISEASE (H): ICD-10-CM

## 2023-07-12 LAB
ANION GAP SERPL CALCULATED.3IONS-SCNC: 9 MMOL/L (ref 7–15)
BUN SERPL-MCNC: 44.1 MG/DL (ref 8–23)
CALCIUM SERPL-MCNC: 9 MG/DL (ref 8.8–10.2)
CHLORIDE SERPL-SCNC: 109 MMOL/L (ref 98–107)
CREAT SERPL-MCNC: 1.76 MG/DL (ref 0.51–0.95)
DEPRECATED HCO3 PLAS-SCNC: 22 MMOL/L (ref 22–29)
GFR SERPL CREATININE-BSD FRML MDRD: 31 ML/MIN/1.73M2
GLUCOSE SERPL-MCNC: 106 MG/DL (ref 70–99)
HBA1C MFR BLD: 6.7 % (ref 0–5.6)
HGB BLD-MCNC: 11 G/DL (ref 11.7–15.7)
POTASSIUM SERPL-SCNC: 4.6 MMOL/L (ref 3.4–5.3)
SODIUM SERPL-SCNC: 140 MMOL/L (ref 136–145)

## 2023-07-12 PROCEDURE — 85018 HEMOGLOBIN: CPT | Performed by: FAMILY MEDICINE

## 2023-07-12 PROCEDURE — 99214 OFFICE O/P EST MOD 30 MIN: CPT | Performed by: FAMILY MEDICINE

## 2023-07-12 PROCEDURE — 80048 BASIC METABOLIC PNL TOTAL CA: CPT | Performed by: FAMILY MEDICINE

## 2023-07-12 PROCEDURE — 83036 HEMOGLOBIN GLYCOSYLATED A1C: CPT | Performed by: FAMILY MEDICINE

## 2023-07-12 PROCEDURE — 36415 COLL VENOUS BLD VENIPUNCTURE: CPT | Performed by: FAMILY MEDICINE

## 2023-07-12 RX ORDER — ACETAMINOPHEN 325 MG/1
650 TABLET ORAL 4 TIMES DAILY
Qty: 100 TABLET | Refills: 3 | Status: SHIPPED | OUTPATIENT
Start: 2023-07-12 | End: 2023-10-12

## 2023-07-12 RX ORDER — LATANOPROST 50 UG/ML
1 SOLUTION/ DROPS OPHTHALMIC DAILY
Qty: 2.5 ML | Refills: 3 | Status: SHIPPED | OUTPATIENT
Start: 2023-07-12 | End: 2023-11-09

## 2023-07-12 ASSESSMENT — PATIENT HEALTH QUESTIONNAIRE - PHQ9: SUM OF ALL RESPONSES TO PHQ QUESTIONS 1-9: 11

## 2023-07-20 DIAGNOSIS — Z79.4 TYPE 2 DIABETES MELLITUS WITH DIABETIC POLYNEUROPATHY, WITH LONG-TERM CURRENT USE OF INSULIN (H): ICD-10-CM

## 2023-07-20 DIAGNOSIS — E11.42 TYPE 2 DIABETES MELLITUS WITH DIABETIC POLYNEUROPATHY, WITH LONG-TERM CURRENT USE OF INSULIN (H): ICD-10-CM

## 2023-08-01 ENCOUNTER — TRANSFERRED RECORDS (OUTPATIENT)
Dept: HEALTH INFORMATION MANAGEMENT | Facility: CLINIC | Age: 68
End: 2023-08-01
Payer: COMMERCIAL

## 2023-08-24 DIAGNOSIS — R05.2 SUBACUTE COUGH: ICD-10-CM

## 2023-08-24 RX ORDER — FLUTICASONE PROPIONATE 50 MCG
1 SPRAY, SUSPENSION (ML) NASAL DAILY
Qty: 16 G | Refills: 3 | Status: SHIPPED | OUTPATIENT
Start: 2023-08-24

## 2023-09-05 DIAGNOSIS — E11.42 TYPE 2 DIABETES MELLITUS WITH DIABETIC POLYNEUROPATHY, WITH LONG-TERM CURRENT USE OF INSULIN (H): ICD-10-CM

## 2023-09-05 DIAGNOSIS — Z79.4 TYPE 2 DIABETES MELLITUS WITH DIABETIC POLYNEUROPATHY, WITH LONG-TERM CURRENT USE OF INSULIN (H): ICD-10-CM

## 2023-09-05 NOTE — TELEPHONE ENCOUNTER
[facility] phone call message- medication clarification/question:    Full Medication Name: insulin aspart (NOVOLOG PEN) 100 UNIT/ML pen       Question: Patient called requesting for refill on medication, if able to fill please send to pharmacy thank you.    Pharmacy confirmed as    [id number] - [place], [place] - [address]: Yes    OK to leave a message on voice mail? Yes    Primary language: Hmong      needed? Yes    Call taken on September 5, 2023 at 10:15 AM by Iesha More

## 2023-09-25 DIAGNOSIS — E55.9 VITAMIN D DEFICIENCY: ICD-10-CM

## 2023-09-25 RX ORDER — CHOLECALCIFEROL (VITAMIN D3) 25 MCG
1 TABLET ORAL DAILY
Qty: 90 TABLET | Refills: 3 | Status: SHIPPED | OUTPATIENT
Start: 2023-09-25 | End: 2024-09-23

## 2023-09-25 NOTE — TELEPHONE ENCOUNTER
Message to physician:     Date of last visit: 7/12/2023    Date of next visit if scheduled:     Potassium   Date Value Ref Range Status   07/12/2023 4.6 3.4 - 5.3 mmol/L Final   05/03/2022 4.3 3.5 - 5.0 mmol/L Final   03/02/2021 4.6 3.4 - 5.3 mmol/L Final     Creatinine   Date Value Ref Range Status   07/12/2023 1.76 (H) 0.51 - 0.95 mg/dL Final   03/02/2021 1.5 (H) 0.6 - 1.3 mg/dL Final     GFR Estimate   Date Value Ref Range Status   07/12/2023 31 (L) >60 mL/min/1.73m2 Final   07/19/2017 40 (L) >60 mL/min/1.73m2 Final       BP Readings from Last 3 Encounters:   07/12/23 137/68   06/27/23 (!) 149/76   06/08/23 (!) 141/66       Hemoglobin A1C   Date Value Ref Range Status   07/12/2023 6.7 (H) 0.0 - 5.6 % Final   06/03/2021 6.8 (H) 4.1 - 5.7 % Final       Please complete refill and CLOSE ENCOUNTER.  Closing the encounter signifies the refill is complete.

## 2023-10-11 NOTE — PROGRESS NOTES
"  Assessment & Plan   (E11.42,  Z79.4) Type 2 diabetes mellitus with diabetic polyneuropathy, with long-term current use of insulin (H)  (primary encounter diagnosis)  Comment: well controlled and will reduce lunch insulin to 16 units, keep dinner insulin at 18 units, keep lantus 48 units (total daily insulin 82 units: basal 58%-bolus 42%)  Plan: Hemoglobin A1c, Albumin Random Urine         Quantitative with Creat Ratio        Recheck February    (Z23) Need for prophylactic vaccination and inoculation against influenza  Comment: agrees  Plan: receive today (    (Z86.15) Personal history of latent tuberculosis infection  Comment: no symptom, known quant and mantoux positive so will instead repeat cxr  Plan: XR CHEST 2 VW, CANCELED: XR CHEST 2 VW        No obvious infiltrate and no symptoms with notification of again close contact    (Z23) Encounter for administration of vaccine  Comment: agrees  Plan: HEPATITIS A VACCINE ADULT IM          Did discuss other vaccines RSV and covid and patient will consider none at this time    (N18.32) Stage 3b chronic kidney disease (H)  Comment: will check urine studies,   Plan: CANCELED: Basic metabolic panel        Referral planned if ckd 4    (M05.741,  M05.742) Rheumatoid arthritis involving both hands with positive rheumatoid factor (H)  Comment: refill  Plan: acetaminophen (TYLENOL) 325 MG tablet             BMI:   Estimated body mass index is 39.07 kg/m  as calculated from the following:    Height as of this encounter: 1.4 m (4' 7.12\").    Weight as of this encounter: 76.6 kg (168 lb 12.8 oz).           No follow-ups on file.    Shannan Benson MD  [facility] Subjective   Omid is a 67 year old, presenting for the following health issues:  Diabetes and Imm/Inj (Flu Shot)         View : No data to display.              HPI     1. DM:  -novolog 18 units with larger meal, no change to bedtime insulin at last meeting  Has been doing 18 units " "with lunch and dinner    Reviewed glucometer and had afternoons 60-80s so     Wt Readings from Last 5 Encounters:   10/12/23 76.6 kg (168 lb 12.8 oz)   07/12/23 77.5 kg (170 lb 12 oz)   06/05/23 78.5 kg (173 lb)   04/20/23 78.5 kg (173 lb)   03/23/23 78.1 kg (172 lb 1.9 oz)     2. [facility] from [facility] exposure to TB close contact  Instructions to get tb testing  Patient with latent TB and needing CXR  No symptoms currently    Review of Systems         Objective    /64   Pulse 58   Temp 98  F (36.7  C) (Oral)   Resp 16   Ht 1.4 m (4' 7.12\")   Wt 76.6 kg (168 lb 12.8 oz)   SpO2 99%   BMI 39.07 kg/m    Body mass index is 39.07 kg/m .  Physical Exam   GENERAL: healthy, alert and no distress  RESP: lungs clear to auscultation - no rales, rhonchi or wheezes  CV: regular rate and rhythm, normal S1 S2, no S3 or S4, soft systolic murmur at URSB, no click or rub, no peripheral edema and peripheral pulses strong    Diabetic Foot Screen:  Any complaints of increased pain or numbness ? No  Is there a foot ulcer now or a history of foot ulcer? No  Does the foot have an abnormal shape? No  Are the nails thick, too long or ingrown?  YES thick and slightly long  Are there any redness or open areas? No         Sensation Testing done at all points on the diagram with monofilament     Right Foot: Sensation Normal at all points  Left Foot: Sensation Normal at all points     Risk Category: 0- No loss of protective sensation  Performed by Shannan Benson MD      No results found for this or any previous visit (from the past 24 hour(s)).      Results from the last 24 hours   Results for orders placed or performed in visit on 10/12/23 (from the past 24 hour(s))   Hemoglobin A1c   Result Value Ref Range    Hemoglobin A1C 6.8 (H) 0.0 - 5.6 %         CXR preliminary no acute infiltrate, no effusion, small nodules unchanged from previous        "

## 2023-10-12 ENCOUNTER — ANCILLARY PROCEDURE (OUTPATIENT)
Dept: GENERAL RADIOLOGY | Facility: CLINIC | Age: 68
End: 2023-10-12
Attending: FAMILY MEDICINE
Payer: COMMERCIAL

## 2023-10-12 ENCOUNTER — OFFICE VISIT (OUTPATIENT)
Dept: FAMILY MEDICINE | Facility: CLINIC | Age: 68
End: 2023-10-12
Payer: COMMERCIAL

## 2023-10-12 VITALS
SYSTOLIC BLOOD PRESSURE: 123 MMHG | DIASTOLIC BLOOD PRESSURE: 64 MMHG | RESPIRATION RATE: 16 BRPM | OXYGEN SATURATION: 99 % | HEIGHT: 55 IN | TEMPERATURE: 98 F | HEART RATE: 58 BPM | WEIGHT: 168.8 LBS | BODY MASS INDEX: 39.07 KG/M2

## 2023-10-12 DIAGNOSIS — Z23 NEED FOR PROPHYLACTIC VACCINATION AND INOCULATION AGAINST INFLUENZA: ICD-10-CM

## 2023-10-12 DIAGNOSIS — M05.741 RHEUMATOID ARTHRITIS INVOLVING BOTH HANDS WITH POSITIVE RHEUMATOID FACTOR (H): ICD-10-CM

## 2023-10-12 DIAGNOSIS — Z86.15 PERSONAL HISTORY OF LATENT TUBERCULOSIS INFECTION: ICD-10-CM

## 2023-10-12 DIAGNOSIS — Z79.4 TYPE 2 DIABETES MELLITUS WITH DIABETIC POLYNEUROPATHY, WITH LONG-TERM CURRENT USE OF INSULIN (H): Primary | ICD-10-CM

## 2023-10-12 DIAGNOSIS — Z23 ENCOUNTER FOR ADMINISTRATION OF VACCINE: ICD-10-CM

## 2023-10-12 DIAGNOSIS — E11.42 TYPE 2 DIABETES MELLITUS WITH DIABETIC POLYNEUROPATHY, WITH LONG-TERM CURRENT USE OF INSULIN (H): Primary | ICD-10-CM

## 2023-10-12 DIAGNOSIS — M05.742 RHEUMATOID ARTHRITIS INVOLVING BOTH HANDS WITH POSITIVE RHEUMATOID FACTOR (H): ICD-10-CM

## 2023-10-12 DIAGNOSIS — N18.32 STAGE 3B CHRONIC KIDNEY DISEASE (H): ICD-10-CM

## 2023-10-12 LAB
CREAT UR-MCNC: 127 MG/DL
HBA1C MFR BLD: 6.8 % (ref 0–5.6)
MICROALBUMIN UR-MCNC: 465 MG/L
MICROALBUMIN/CREAT UR: 366.14 MG/G CR (ref 0–25)

## 2023-10-12 PROCEDURE — 82570 ASSAY OF URINE CREATININE: CPT | Performed by: FAMILY MEDICINE

## 2023-10-12 PROCEDURE — 36415 COLL VENOUS BLD VENIPUNCTURE: CPT | Performed by: FAMILY MEDICINE

## 2023-10-12 PROCEDURE — 90471 IMMUNIZATION ADMIN: CPT | Performed by: FAMILY MEDICINE

## 2023-10-12 PROCEDURE — 99214 OFFICE O/P EST MOD 30 MIN: CPT | Mod: 25 | Performed by: FAMILY MEDICINE

## 2023-10-12 PROCEDURE — 82043 UR ALBUMIN QUANTITATIVE: CPT | Performed by: FAMILY MEDICINE

## 2023-10-12 PROCEDURE — 83036 HEMOGLOBIN GLYCOSYLATED A1C: CPT | Performed by: FAMILY MEDICINE

## 2023-10-12 PROCEDURE — 71046 X-RAY EXAM CHEST 2 VIEWS: CPT | Mod: TC | Performed by: RADIOLOGY

## 2023-10-12 PROCEDURE — 90662 IIV NO PRSV INCREASED AG IM: CPT | Performed by: FAMILY MEDICINE

## 2023-10-12 PROCEDURE — 90472 IMMUNIZATION ADMIN EACH ADD: CPT | Performed by: FAMILY MEDICINE

## 2023-10-12 PROCEDURE — 90632 HEPA VACCINE ADULT IM: CPT | Performed by: FAMILY MEDICINE

## 2023-10-12 RX ORDER — ACETAMINOPHEN 325 MG/1
650 TABLET ORAL 4 TIMES DAILY
Qty: 100 TABLET | Refills: 3 | Status: SHIPPED | OUTPATIENT
Start: 2023-10-12 | End: 2024-02-20

## 2023-10-23 DIAGNOSIS — E78.00 PURE HYPERCHOLESTEROLEMIA: ICD-10-CM

## 2023-10-23 DIAGNOSIS — I10 ESSENTIAL HYPERTENSION: ICD-10-CM

## 2023-10-23 RX ORDER — ROSUVASTATIN CALCIUM 10 MG/1
10 TABLET, COATED ORAL DAILY
Qty: 90 TABLET | Refills: 3 | Status: SHIPPED | OUTPATIENT
Start: 2023-10-23

## 2023-10-23 RX ORDER — LOSARTAN POTASSIUM 100 MG/1
100 TABLET ORAL DAILY
Qty: 90 TABLET | Refills: 3 | Status: SHIPPED | OUTPATIENT
Start: 2023-10-23

## 2023-11-06 DIAGNOSIS — Z79.4 TYPE 2 DIABETES MELLITUS WITH DIABETIC POLYNEUROPATHY, WITH LONG-TERM CURRENT USE OF INSULIN (H): ICD-10-CM

## 2023-11-06 DIAGNOSIS — E11.42 TYPE 2 DIABETES MELLITUS WITH DIABETIC POLYNEUROPATHY, WITH LONG-TERM CURRENT USE OF INSULIN (H): ICD-10-CM

## 2023-11-07 RX ORDER — PIOGLITAZONEHYDROCHLORIDE 15 MG/1
15 TABLET ORAL DAILY
Qty: 90 TABLET | Refills: 3 | Status: SHIPPED | OUTPATIENT
Start: 2023-11-07 | End: 2024-03-19

## 2023-11-09 DIAGNOSIS — H42 GLAUCOMA OF BOTH EYES ASSOCIATED WITH UNDERLYING DISEASE: ICD-10-CM

## 2023-11-09 RX ORDER — LATANOPROST 50 UG/ML
1 SOLUTION/ DROPS OPHTHALMIC DAILY
Qty: 2.5 ML | Refills: 3 | Status: SHIPPED | OUTPATIENT
Start: 2023-11-09 | End: 2024-05-03

## 2023-11-09 NOTE — TELEPHONE ENCOUNTER
Message to physician:     Date of last visit: 10/12/2023    Date of next visit if scheduled:     Potassium   Date Value Ref Range Status   07/12/2023 4.6 3.4 - 5.3 mmol/L Final   05/03/2022 4.3 3.5 - 5.0 mmol/L Final   03/02/2021 4.6 3.4 - 5.3 mmol/L Final     Creatinine   Date Value Ref Range Status   07/12/2023 1.76 (H) 0.51 - 0.95 mg/dL Final   03/02/2021 1.5 (H) 0.6 - 1.3 mg/dL Final     GFR Estimate   Date Value Ref Range Status   07/12/2023 31 (L) >60 mL/min/1.73m2 Final   07/19/2017 40 (L) >60 mL/min/1.73m2 Final       BP Readings from Last 3 Encounters:   10/12/23 123/64   07/12/23 137/68   06/27/23 (!) 149/76       Hemoglobin A1C   Date Value Ref Range Status   10/12/2023 6.8 (H) 0.0 - 5.6 % Final     Comment:     Normal <5.7%   Prediabetes 5.7-6.4%    Diabetes 6.5% or higher     Note: Adopted from ADA consensus guidelines.   06/03/2021 6.8 (H) 4.1 - 5.7 % Final       Please complete refill and CLOSE ENCOUNTER.  Closing the encounter signifies the refill is complete.

## 2023-11-21 NOTE — RESULT ENCOUNTER NOTE
Call patient:    Bone density test shows thin bones.  We will discuss at your next visit.  Do not need new medicines yet and recommend exercises, vitamin D and calcium to keep bones strong.   Crescentic Advancement Flap Text: The defect edges were debeveled with a #15 scalpel blade. Given the location of the defect and the proximity to free margins a crescentic advancement flap was deemed most appropriate. Using a sterile surgical marker, the appropriate advancement flap was drawn incorporating the defect and placing the expected incisions within the relaxed skin tension lines where possible. The area thus outlined was incised deep to adipose tissue with a #15 scalpel blade. The skin margins were undermined to an appropriate distance in all directions utilizing iris scissors. Following this, the designed flap was advanced and carried over into the primary defect and sutured into place.

## 2023-12-11 DIAGNOSIS — Z79.4 TYPE 2 DIABETES MELLITUS WITH DIABETIC POLYNEUROPATHY, WITH LONG-TERM CURRENT USE OF INSULIN (H): ICD-10-CM

## 2023-12-11 DIAGNOSIS — E11.42 TYPE 2 DIABETES MELLITUS WITH DIABETIC POLYNEUROPATHY, WITH LONG-TERM CURRENT USE OF INSULIN (H): ICD-10-CM

## 2023-12-11 NOTE — TELEPHONE ENCOUNTER
Message to physician: Rx refill    Date of last visit: 10/12/2023    Date of next visit if scheduled: none    Potassium   Date Value Ref Range Status   07/12/2023 4.6 3.4 - 5.3 mmol/L Final   05/03/2022 4.3 3.5 - 5.0 mmol/L Final   03/02/2021 4.6 3.4 - 5.3 mmol/L Final     Creatinine   Date Value Ref Range Status   07/12/2023 1.76 (H) 0.51 - 0.95 mg/dL Final   03/02/2021 1.5 (H) 0.6 - 1.3 mg/dL Final     GFR Estimate   Date Value Ref Range Status   07/12/2023 31 (L) >60 mL/min/1.73m2 Final   07/19/2017 40 (L) >60 mL/min/1.73m2 Final       BP Readings from Last 3 Encounters:   10/12/23 123/64   07/12/23 137/68   06/27/23 (!) 149/76       Hemoglobin A1C   Date Value Ref Range Status   10/12/2023 6.8 (H) 0.0 - 5.6 % Final     Comment:     Normal <5.7%   Prediabetes 5.7-6.4%    Diabetes 6.5% or higher     Note: Adopted from ADA consensus guidelines.   06/03/2021 6.8 (H) 4.1 - 5.7 % Final       Please complete refill and CLOSE ENCOUNTER.  Closing the encounter signifies the refill is complete.

## 2024-01-02 DIAGNOSIS — E11.42 TYPE 2 DIABETES MELLITUS WITH DIABETIC POLYNEUROPATHY, WITH LONG-TERM CURRENT USE OF INSULIN (H): ICD-10-CM

## 2024-01-02 DIAGNOSIS — Z79.4 TYPE 2 DIABETES MELLITUS WITH DIABETIC POLYNEUROPATHY, WITH LONG-TERM CURRENT USE OF INSULIN (H): ICD-10-CM

## 2024-01-02 RX ORDER — LIRAGLUTIDE 6 MG/ML
1.8 INJECTION SUBCUTANEOUS DAILY
Qty: 27 ML | Refills: 1 | Status: SHIPPED | OUTPATIENT
Start: 2024-01-02 | End: 2024-09-12

## 2024-01-10 ENCOUNTER — OFFICE VISIT (OUTPATIENT)
Dept: FAMILY MEDICINE | Facility: CLINIC | Age: 69
End: 2024-01-10
Payer: COMMERCIAL

## 2024-01-10 VITALS
WEIGHT: 171 LBS | RESPIRATION RATE: 20 BRPM | HEART RATE: 56 BPM | SYSTOLIC BLOOD PRESSURE: 144 MMHG | BODY MASS INDEX: 35.89 KG/M2 | HEIGHT: 58 IN | TEMPERATURE: 98.1 F | OXYGEN SATURATION: 99 % | DIASTOLIC BLOOD PRESSURE: 72 MMHG

## 2024-01-10 DIAGNOSIS — E66.01 CLASS 3 SEVERE OBESITY WITH SERIOUS COMORBIDITY AND BODY MASS INDEX (BMI) OF 40.0 TO 44.9 IN ADULT, UNSPECIFIED OBESITY TYPE (H): ICD-10-CM

## 2024-01-10 DIAGNOSIS — E11.42 TYPE 2 DIABETES MELLITUS WITH DIABETIC POLYNEUROPATHY, WITH LONG-TERM CURRENT USE OF INSULIN (H): ICD-10-CM

## 2024-01-10 DIAGNOSIS — F33.1 MAJOR DEPRESSIVE DISORDER, RECURRENT EPISODE, MODERATE (H): ICD-10-CM

## 2024-01-10 DIAGNOSIS — Z23 ENCOUNTER FOR ADMINISTRATION OF VACCINE: ICD-10-CM

## 2024-01-10 DIAGNOSIS — M06.4 INFLAMMATORY POLYARTHROPATHY (H): ICD-10-CM

## 2024-01-10 DIAGNOSIS — E11.3293 TYPE 2 DIABETES MELLITUS WITH BOTH EYES AFFECTED BY MILD NONPROLIFERATIVE RETINOPATHY WITHOUT MACULAR EDEMA, WITH LONG-TERM CURRENT USE OF INSULIN (H): Primary | ICD-10-CM

## 2024-01-10 DIAGNOSIS — Z79.4 TYPE 2 DIABETES MELLITUS WITH DIABETIC POLYNEUROPATHY, WITH LONG-TERM CURRENT USE OF INSULIN (H): ICD-10-CM

## 2024-01-10 DIAGNOSIS — N18.32 STAGE 3B CHRONIC KIDNEY DISEASE (H): ICD-10-CM

## 2024-01-10 DIAGNOSIS — E66.813 CLASS 3 SEVERE OBESITY WITH SERIOUS COMORBIDITY AND BODY MASS INDEX (BMI) OF 40.0 TO 44.9 IN ADULT, UNSPECIFIED OBESITY TYPE (H): ICD-10-CM

## 2024-01-10 DIAGNOSIS — Z79.4 TYPE 2 DIABETES MELLITUS WITH BOTH EYES AFFECTED BY MILD NONPROLIFERATIVE RETINOPATHY WITHOUT MACULAR EDEMA, WITH LONG-TERM CURRENT USE OF INSULIN (H): Primary | ICD-10-CM

## 2024-01-10 DIAGNOSIS — B18.1 HEPATITIS B CARRIER (H): ICD-10-CM

## 2024-01-10 LAB
AFP SERPL-MCNC: 2.3 NG/ML
ANION GAP SERPL CALCULATED.3IONS-SCNC: 7 MMOL/L (ref 7–15)
BUN SERPL-MCNC: 42.5 MG/DL (ref 8–23)
CALCIUM SERPL-MCNC: 8.7 MG/DL (ref 8.8–10.2)
CHLORIDE SERPL-SCNC: 107 MMOL/L (ref 98–107)
CREAT SERPL-MCNC: 1.77 MG/DL (ref 0.51–0.95)
DEPRECATED HCO3 PLAS-SCNC: 22 MMOL/L (ref 22–29)
EGFRCR SERPLBLD CKD-EPI 2021: 31 ML/MIN/1.73M2
GLUCOSE SERPL-MCNC: 285 MG/DL (ref 70–99)
HBA1C MFR BLD: 7.2 % (ref 0–5.6)
POTASSIUM SERPL-SCNC: 4.6 MMOL/L (ref 3.4–5.3)
SODIUM SERPL-SCNC: 136 MMOL/L (ref 135–145)

## 2024-01-10 PROCEDURE — 83036 HEMOGLOBIN GLYCOSYLATED A1C: CPT | Performed by: FAMILY MEDICINE

## 2024-01-10 PROCEDURE — 80048 BASIC METABOLIC PNL TOTAL CA: CPT | Performed by: FAMILY MEDICINE

## 2024-01-10 PROCEDURE — 82105 ALPHA-FETOPROTEIN SERUM: CPT | Performed by: FAMILY MEDICINE

## 2024-01-10 PROCEDURE — 36415 COLL VENOUS BLD VENIPUNCTURE: CPT | Performed by: FAMILY MEDICINE

## 2024-01-10 PROCEDURE — 99214 OFFICE O/P EST MOD 30 MIN: CPT | Performed by: FAMILY MEDICINE

## 2024-01-10 ASSESSMENT — PATIENT HEALTH QUESTIONNAIRE - PHQ9: SUM OF ALL RESPONSES TO PHQ QUESTIONS 1-9: 4

## 2024-01-10 NOTE — LETTER
January 11, 2024      Omid Adonro  [address] [place] [address]        Dear ,    We are writing to inform you of your test results.    Your test results fall within the expected range(s) or remain unchanged from previous results.  Please continue with current treatment plan.    Resulted Orders   HEMOGLOBIN A1C   Result Value Ref Range    Hemoglobin A1C 7.2 (H) 0.0 - 5.6 %      Comment:      Normal <5.7%   Prediabetes 5.7-6.4%    Diabetes 6.5% or higher     Note: Adopted from ADA consensus guidelines.   Basic metabolic panel   Result Value Ref Range    Sodium 136 135 - 145 mmol/L      Comment:      Reference intervals for this test were updated on 09/26/2023 to more accurately reflect our healthy population. There may be differences in the flagging of prior results with similar values performed with this method. Interpretation of those prior results can be made in the context of the updated reference intervals.     Potassium 4.6 3.4 - 5.3 mmol/L    Chloride 107 98 - 107 mmol/L    Carbon Dioxide (CO2) 22 22 - 29 mmol/L    Anion Gap 7 7 - 15 mmol/L    Urea Nitrogen 42.5 (H) 8.0 - 23.0 mg/dL    Creatinine 1.77 (H) 0.51 - 0.95 mg/dL    GFR Estimate 31 (L) >60 mL/min/1.73m2    Calcium 8.7 (L) 8.8 - 10.2 mg/dL    Glucose 285 (H) 70 - 99 mg/dL   AFP tumor marker   Result Value Ref Range    AFP tumor marker 2.3 <=8.3 ng/mL    Narrative    This result is obtained using the Roche Elecsys AFP method on  the curtis [id number] immunoassay analyzer. Results obtained with different assay methods or kits cannot be used interchangeably.  Reference ranges apply to non-pregnant females only.       If you have any questions or concerns, please call the clinic at the number listed above.       Sincerely,      Shannan Benson MD

## 2024-01-10 NOTE — PROGRESS NOTES
"  Assessment & Plan     (E11.3293,  Z79.4) Type 2 diabetes mellitus with both eyes affected by mild nonproliferative retinopathy without macular edema, with long-term current use of insulin (H)  (primary encounter diagnosis)  Comment: stable  Plan: HEMOGLOBIN A1C        No changes to current oral meds and insulin    (N18.32) Stage 3b chronic kidney disease (H)  Comment: continue to track carefully  Plan: Basic metabolic panel        Home readings and at adult  with nurse check are all systolic 120s    (E66.01,  Z68.41) Class 3 severe obesity with serious comorbidity and body mass index (BMI) of 40.0 to 44.9 in adult, unspecified obesity type (H)  Comment: working on food reduction  Wt Readings from Last 5 Encounters:   01/10/24 77.6 kg (171 lb)   10/12/23 76.6 kg (168 lb 12.8 oz)   07/12/23 77.5 kg (170 lb 12 oz)   06/05/23 78.5 kg (173 lb)   04/20/23 78.5 kg (173 lb)       Plan: supporting patient on weight maintenance and goals gradually lose    (B18.1) Hepatitis B carrier (H)  Comment: has refused and not tolerated treatment  Plan: would qualify for hepatoma screening Q 6 month ultrasounds and levels    INflammatory polyarthropathy: has seen rheum in 2011: failed plaquenil, other sulfasalazine med, for years quite stable             BMI:   Estimated body mass index is 35.89 kg/m  as calculated from the following:    Height as of this encounter: 1.47 m (4' 9.87\").    Weight as of this encounter: 77.6 kg (171 lb).           No follow-ups on file.    Shannan Benson MD  [facility] ISHMAEL Anne is a 68 year old, presenting for the following health issues:  Diabetes      1/10/2024     9:41 AM   Additional Questions   Roomed by Marguerite SIMON         Diabetes Follow-up    How often are you checking your blood sugar? 1 or 2 times daily (using 16 units with meals) 38 units of long acting  What concerns do you have today about your diabetes? None, noticing slightly lower " "sugars in the new year   Do you have any of these symptoms? (Select all that apply)  Not having symptoms (had a low of 73 and 67 one in AM and one in PM)          Hyperlipidemia Follow-Up    Are you regularly taking any medication or supplement to lower your cholesterol?   Yes- crestor  Are you having muscle aches or other side effects that you think could be caused by your cholesterol lowering medication?  No  Does seem to dislike eating fatty meats  Hypertension Follow-up    Do you check your blood pressure regularly outside of the clinic? Yes   Are you following a low salt diet? Yes  Are your blood pressures ever more than 140 on the top number (systolic) OR more   than 90 on the bottom number (diastolic), for example 140/90? No    BP Readings from Last 2 Encounters:   01/10/24 (!) 146/69   10/12/23 123/64     Hemoglobin A1C (%)   Date Value   10/12/2023 6.8 (H)   07/12/2023 6.7 (H)   06/03/2021 6.8 (H)   03/02/2021 7.3 (H)     LDL Cholesterol Calculated (mg/dL)   Date Value   03/02/2023 63   12/24/2021 58   07/24/2012 83   11/08/2011 98     LDL Cholesterol Direct (mg/dL)   Date Value   10/02/2020 52.0   03/08/2019 49.0   How many days per week do you miss taking your medication? 0      Review of Systems         Denies GI/ issues    Objective    BP (!) 146/69   Pulse 56   Temp 98.1  F (36.7  C) (Oral)   Resp 20   Ht 1.47 m (4' 9.87\")   Wt 77.6 kg (171 lb)   SpO2 99%   BMI 35.89 kg/m    Body mass index is 35.89 kg/m .  Physical Exam   GENERAL: healthy, alert and no distress  RESP: lungs clear to auscultation - no rales, rhonchi or wheezes  CV: regular rate and rhythm, normal S1 S2, no S3 or S4, no murmur, click or rub, no peripheral edema and peripheral pulses strong  ABDOMEN: soft, nontender, no hepatosplenomegaly, no masses and bowel sounds normal  MS: no gross musculoskeletal defects noted, no edema    Results for orders placed or performed in visit on 01/10/24 (from the past 24 hour(s))   HEMOGLOBIN " A1C   Result Value Ref Range    Hemoglobin A1C 7.2 (H) 0.0 - 5.6 %

## 2024-01-11 NOTE — RESULT ENCOUNTER NOTE
Call patient:  Kidneys remain stable.  Test for liver was also good.  Will need to get ultrasound of liver and that's been ordered. Could possibly arrange at [facility] or [facility]  Shannan Benson MD

## 2024-01-12 ENCOUNTER — APPOINTMENT (OUTPATIENT)
Dept: INTERPRETER SERVICES | Facility: CLINIC | Age: 69
End: 2024-01-12
Payer: COMMERCIAL

## 2024-01-18 ENCOUNTER — ANCILLARY PROCEDURE (OUTPATIENT)
Dept: ULTRASOUND IMAGING | Facility: CLINIC | Age: 69
End: 2024-01-18
Attending: FAMILY MEDICINE
Payer: COMMERCIAL

## 2024-01-18 DIAGNOSIS — B18.1 HEPATITIS B CARRIER (H): ICD-10-CM

## 2024-01-18 PROCEDURE — 76705 ECHO EXAM OF ABDOMEN: CPT | Mod: TC | Performed by: RADIOLOGY

## 2024-01-18 NOTE — NURSING NOTE
Due to patient being non-English speaking/uses sign language, an  was used for this visit. Only for face-to-face interpretation by an external agency, date and length of interpretation can be found on the scanned worksheet.     name: [facility] #[id number]  Agency:  [employer]  Language: Bobby   Telephone number: [phone]  Type of interpretation: Telephone, spoken

## 2024-01-18 NOTE — RESULT ENCOUNTER NOTE
Call patient: ultrasound fine, showing hepatitis b changes but not identifying any tumors. We check the liver for tumors every 6 months.

## 2024-02-01 ENCOUNTER — VIRTUAL VISIT (OUTPATIENT)
Dept: FAMILY MEDICINE | Facility: CLINIC | Age: 69
End: 2024-02-01
Payer: COMMERCIAL

## 2024-02-01 DIAGNOSIS — I10 ESSENTIAL HYPERTENSION: Primary | ICD-10-CM

## 2024-02-01 DIAGNOSIS — Z79.4 TYPE 2 DIABETES MELLITUS WITH BOTH EYES AFFECTED BY MILD NONPROLIFERATIVE RETINOPATHY WITHOUT MACULAR EDEMA, WITH LONG-TERM CURRENT USE OF INSULIN (H): ICD-10-CM

## 2024-02-01 DIAGNOSIS — N18.32 STAGE 3B CHRONIC KIDNEY DISEASE (H): ICD-10-CM

## 2024-02-01 DIAGNOSIS — E11.3293 TYPE 2 DIABETES MELLITUS WITH BOTH EYES AFFECTED BY MILD NONPROLIFERATIVE RETINOPATHY WITHOUT MACULAR EDEMA, WITH LONG-TERM CURRENT USE OF INSULIN (H): ICD-10-CM

## 2024-02-01 PROCEDURE — 99442 PR PHYSICIAN TELEPHONE EVALUATION 11-20 MIN: CPT | Mod: 93 | Performed by: FAMILY MEDICINE

## 2024-02-01 NOTE — PROGRESS NOTES
"Omid is a 68 year old who is being evaluated via a billable telephone visit.      What phone number would you like to be contacted at? [phone]  How would you like to obtain your AVS?     [place] Location (provider location):  On-site    Assessment & Plan     (I10) Essential hypertension  (primary encounter diagnosis)  Comment: well controlled  Plan: no changes to meds    (N18.32) Stage 3b chronic kidney disease (H)  Comment: continue close monitoring  Plan: referral if stage  IV    (E11.3293,  Z79.4) Type 2 diabetes mellitus with both eyes affected by mild nonproliferative retinopathy without macular edema, with long-term current use of insulin (H)  Comment: refilled  Plan: blood glucose (ACCU-CHEK CHIKA) test strip,         blood glucose monitoring (ACCU-CHEK MULTICLIX)         lancets                    BMI  Estimated body mass index is 35.89 kg/m  as calculated from the following:    Height as of 1/10/24: 1.47 m (4' 9.87\").    Weight as of 1/10/24: 77.6 kg (171 lb).             No follow-ups on file.    Subjective   Omid is a 68 year old, presenting for the following health issues:  Hypertension and Recheck Medication (Needs attention)    HPI     Hypertension Follow-up    Do you check your blood pressure regularly outside of the clinic? Yes   Are you following a low salt diet? Yes  Are your blood pressures ever more than 140 on the top number (systolic) OR more   than 90 on the bottom number (diastolic), for example 140/90? No  How many servings of fruits and vegetables do you eat daily?  4 or more  How many days per week do you miss taking your medication? 0            Objective    Vitals - Patient Reported  Systolic (Patient Reported): 121  Diastolic (Patient Reported): 68    Physical Exam   General: Alert and no distress //Respiratory: No audible wheeze, cough, or shortness of breath // Psychiatric:  Appropriate affect, tone, and pace of words            Phone call duration: 20 minutes  Signed " Electronically by: Shannan Benson MD

## 2024-02-05 LAB — RETINOPATHY: NORMAL

## 2024-02-19 DIAGNOSIS — M05.741 RHEUMATOID ARTHRITIS INVOLVING BOTH HANDS WITH POSITIVE RHEUMATOID FACTOR (H): ICD-10-CM

## 2024-02-19 DIAGNOSIS — M05.742 RHEUMATOID ARTHRITIS INVOLVING BOTH HANDS WITH POSITIVE RHEUMATOID FACTOR (H): ICD-10-CM

## 2024-02-20 RX ORDER — ACETAMINOPHEN 325 MG/1
TABLET ORAL
Qty: 100 TABLET | Refills: 3 | Status: SHIPPED | OUTPATIENT
Start: 2024-02-20 | End: 2024-09-24

## 2024-03-12 ENCOUNTER — APPOINTMENT (OUTPATIENT)
Dept: INTERPRETER SERVICES | Facility: CLINIC | Age: 69
End: 2024-03-12
Payer: COMMERCIAL

## 2024-03-19 ENCOUNTER — OFFICE VISIT (OUTPATIENT)
Dept: FAMILY MEDICINE | Facility: CLINIC | Age: 69
End: 2024-03-19
Payer: COMMERCIAL

## 2024-03-19 VITALS
DIASTOLIC BLOOD PRESSURE: 69 MMHG | TEMPERATURE: 97.9 F | HEART RATE: 57 BPM | HEIGHT: 56 IN | RESPIRATION RATE: 18 BRPM | WEIGHT: 203 LBS | SYSTOLIC BLOOD PRESSURE: 151 MMHG | BODY MASS INDEX: 45.67 KG/M2 | OXYGEN SATURATION: 97 %

## 2024-03-19 DIAGNOSIS — E11.3293 TYPE 2 DIABETES MELLITUS WITH BOTH EYES AFFECTED BY MILD NONPROLIFERATIVE RETINOPATHY WITHOUT MACULAR EDEMA, WITH LONG-TERM CURRENT USE OF INSULIN (H): Primary | ICD-10-CM

## 2024-03-19 DIAGNOSIS — Z29.11 ENCOUNTER FOR PROPHYLACTIC IMMUNOTHERAPY FOR RESPIRATORY SYNCYTIAL VIRUS (RSV): ICD-10-CM

## 2024-03-19 DIAGNOSIS — N18.32 STAGE 3B CHRONIC KIDNEY DISEASE (H): ICD-10-CM

## 2024-03-19 DIAGNOSIS — E78.00 PURE HYPERCHOLESTEROLEMIA: ICD-10-CM

## 2024-03-19 DIAGNOSIS — I10 ESSENTIAL HYPERTENSION: ICD-10-CM

## 2024-03-19 DIAGNOSIS — Z79.4 TYPE 2 DIABETES MELLITUS WITH BOTH EYES AFFECTED BY MILD NONPROLIFERATIVE RETINOPATHY WITHOUT MACULAR EDEMA, WITH LONG-TERM CURRENT USE OF INSULIN (H): Primary | ICD-10-CM

## 2024-03-19 DIAGNOSIS — K21.9 GASTROESOPHAGEAL REFLUX DISEASE WITHOUT ESOPHAGITIS: ICD-10-CM

## 2024-03-19 PROCEDURE — 82570 ASSAY OF URINE CREATININE: CPT | Performed by: FAMILY MEDICINE

## 2024-03-19 PROCEDURE — 96372 THER/PROPH/DIAG INJ SC/IM: CPT | Performed by: FAMILY MEDICINE

## 2024-03-19 PROCEDURE — 82043 UR ALBUMIN QUANTITATIVE: CPT | Performed by: FAMILY MEDICINE

## 2024-03-19 PROCEDURE — 80061 LIPID PANEL: CPT | Performed by: FAMILY MEDICINE

## 2024-03-19 PROCEDURE — 99214 OFFICE O/P EST MOD 30 MIN: CPT | Mod: 25 | Performed by: FAMILY MEDICINE

## 2024-03-19 PROCEDURE — 36415 COLL VENOUS BLD VENIPUNCTURE: CPT | Performed by: FAMILY MEDICINE

## 2024-03-19 PROCEDURE — 90678 RSV VACC PREF BIVALENT IM: CPT | Performed by: FAMILY MEDICINE

## 2024-03-19 RX ORDER — HYDROCHLOROTHIAZIDE 25 MG/1
25 TABLET ORAL DAILY
Qty: 90 TABLET | Refills: 3 | Status: SHIPPED | OUTPATIENT
Start: 2024-03-19 | End: 2024-04-23

## 2024-03-19 NOTE — PROGRESS NOTES
"  Assessment & Plan     Type 2 diabetes mellitus with both eyes affected by mild nonproliferative retinopathy without macular edema, with long-term current use of insulin (H)  Given CKD stage 3B will start SLGT2 and discussed nephrology evaluation with any change when starts jardiance 10 mg will stop the pioglitazone, recheck in April  - Albumin Random Urine Quantitative with Creat Ratio; Future  - Albumin Random Urine Quantitative with Creat Ratio  - empagliflozin (JARDIANCE) 10 MG TABS tablet; Take 1 tablet (10 mg) by mouth daily    Pure hypercholesterolemia  Adjust meds if needed  - Lipid Profile; Future  - Lipid Profile    Encounter for prophylactic immunotherapy for respiratory syncytial virus (RSV)  agrees  - RSV VACCINE (ABRYSVO)    Essential hypertension  May see improved bp control with   - hydrochlorothiazide (HYDRODIURIL) 25 MG tablet; Take 1 tablet (25 mg) by mouth daily    Gastroesophageal reflux disease without esophagitis  refilled  - omeprazole (PRILOSEC) 20 MG DR capsule; Take 1 capsule (20 mg) by mouth daily            BMI  Estimated body mass index is 45.51 kg/m  as calculated from the following:    Height as of this encounter: 1.422 m (4' 8\").    Weight as of this encounter: 92.1 kg (203 lb).             No follow-ups on file.    Cornelia Anne is a 68 year old, presenting for the following health issues:  Diabetes    HPI       Diabetes Follow-up    How often are you checking your blood sugar? Two times daily  Blood sugar testing frequency justification:  Risk of hypoglycemia with medication(s)  What time of day are you checking your blood sugars (select all that apply)?  Before and after meals  Have you had any blood sugars above 200?  No  Have you had any blood sugars below 70?  Yes not recently  What symptoms do you notice when your blood sugar is low?  Dylon  What concerns do you have today about your diabetes? None          Hyperlipidemia Follow-Up    Are you regularly taking any " "medication or supplement to lower your cholesterol?   Yes- rosuvastatin  Are you having muscle aches or other side effects that you think could be caused by your cholesterol lowering medication?  No    Hypertension Follow-up    Do you check your blood pressure regularly outside of the clinic? Yes   Are you following a low salt diet? Yes  Are your blood pressures ever more than 140 on the top number (systolic) OR more   than 90 on the bottom number (diastolic), for example 140/90? No    BP Readings from Last 2 Encounters:   03/19/24 (!) 151/69   01/10/24 (!) 144/72     Hemoglobin A1C (%)   Date Value   01/10/2024 7.2 (H)   10/12/2023 6.8 (H)   06/03/2021 6.8 (H)   03/02/2021 7.3 (H)     LDL Cholesterol Calculated (mg/dL)   Date Value   03/02/2023 63   12/24/2021 58   07/24/2012 83   11/08/2011 98     LDL Cholesterol Direct (mg/dL)   Date Value   10/02/2020 52.0   03/08/2019 49.0             Objective    BP (!) 151/69   Pulse 57   Temp 97.9  F (36.6  C) (Oral)   Resp 18   Ht 1.422 m (4' 8\")   Wt 92.1 kg (203 lb)   SpO2 97%   BMI 45.51 kg/m    Body mass index is 45.51 kg/m .  Physical Exam   GENERAL: alert and no distress  RESP: lungs clear to auscultation - no rales, rhonchi or wheezes  CV: regular rate and rhythm, normal S1 S2, no S3 or S4, no murmur, click or rub, no peripheral edema  MS: no gross musculoskeletal defects noted, no edema    No results found for this or any previous visit (from the past 24 hour(s)).        Signed Electronically by: Shannan Benson MD    "

## 2024-03-20 LAB
CHOLEST SERPL-MCNC: 120 MG/DL
CREAT UR-MCNC: 82.9 MG/DL
FASTING STATUS PATIENT QL REPORTED: ABNORMAL
HDLC SERPL-MCNC: 41 MG/DL
LDLC SERPL CALC-MCNC: 52 MG/DL
MICROALBUMIN UR-MCNC: 501 MG/L
MICROALBUMIN/CREAT UR: 604.34 MG/G CR (ref 0–25)
NONHDLC SERPL-MCNC: 79 MG/DL
TRIGL SERPL-MCNC: 133 MG/DL

## 2024-03-22 NOTE — RESULT ENCOUNTER NOTE
Call patient:  Kidney is changing slightly from long standing diabetes.  I would like you to see a kidney specialist to make sure we're preserving your kidneys with the best medicine.  I'll put in a referral for a kidney office visit.  Shannan Benson MD

## 2024-03-29 DIAGNOSIS — E11.42 TYPE 2 DIABETES MELLITUS WITH DIABETIC POLYNEUROPATHY, WITH LONG-TERM CURRENT USE OF INSULIN (H): ICD-10-CM

## 2024-03-29 DIAGNOSIS — Z79.4 TYPE 2 DIABETES MELLITUS WITH DIABETIC POLYNEUROPATHY, WITH LONG-TERM CURRENT USE OF INSULIN (H): ICD-10-CM

## 2024-03-29 RX ORDER — INSULIN DETEMIR 100 [IU]/ML
INJECTION, SOLUTION SUBCUTANEOUS
Qty: 30 ML | Refills: 3 | Status: SHIPPED | OUTPATIENT
Start: 2024-03-29 | End: 2024-08-14

## 2024-03-29 NOTE — TELEPHONE ENCOUNTER
Message to physician:     Date of last visit: 3/19/2024    Date of next visit if scheduled:    Potassium   Date Value Ref Range Status   01/10/2024 4.6 3.4 - 5.3 mmol/L Final   05/03/2022 4.3 3.5 - 5.0 mmol/L Final   03/02/2021 4.6 3.4 - 5.3 mmol/L Final     Creatinine   Date Value Ref Range Status   01/10/2024 1.77 (H) 0.51 - 0.95 mg/dL Final   03/02/2021 1.5 (H) 0.6 - 1.3 mg/dL Final     GFR Estimate   Date Value Ref Range Status   01/10/2024 31 (L) >60 mL/min/1.73m2 Final   07/19/2017 40 (L) >60 mL/min/1.73m2 Final       BP Readings from Last 3 Encounters:   03/19/24 (!) 151/69   01/10/24 (!) 144/72   10/12/23 123/64       Hemoglobin A1C   Date Value Ref Range Status   01/10/2024 7.2 (H) 0.0 - 5.6 % Final     Comment:     Normal <5.7%   Prediabetes 5.7-6.4%    Diabetes 6.5% or higher     Note: Adopted from ADA consensus guidelines.   06/03/2021 6.8 (H) 4.1 - 5.7 % Final       Please complete refill and CLOSE ENCOUNTER.  Closing the encounter signifies the refill is complete.

## 2024-04-12 DIAGNOSIS — E11.42 TYPE 2 DIABETES MELLITUS WITH DIABETIC POLYNEUROPATHY, WITH LONG-TERM CURRENT USE OF INSULIN (H): ICD-10-CM

## 2024-04-12 DIAGNOSIS — Z79.4 TYPE 2 DIABETES MELLITUS WITH DIABETIC POLYNEUROPATHY, WITH LONG-TERM CURRENT USE OF INSULIN (H): ICD-10-CM

## 2024-04-12 RX ORDER — PEN NEEDLE, DIABETIC 31 GX5/16"
NEEDLE, DISPOSABLE MISCELLANEOUS
Qty: 180 EACH | Refills: 3 | Status: SHIPPED | OUTPATIENT
Start: 2024-04-12

## 2024-04-23 DIAGNOSIS — I10 ESSENTIAL HYPERTENSION: ICD-10-CM

## 2024-04-23 RX ORDER — HYDROCHLOROTHIAZIDE 25 MG/1
25 TABLET ORAL DAILY
Qty: 90 TABLET | Refills: 3 | Status: SHIPPED | OUTPATIENT
Start: 2024-04-23

## 2024-05-03 DIAGNOSIS — H42 GLAUCOMA OF BOTH EYES ASSOCIATED WITH UNDERLYING DISEASE: ICD-10-CM

## 2024-05-03 RX ORDER — LATANOPROST 50 UG/ML
SOLUTION/ DROPS OPHTHALMIC
Qty: 5 ML | Refills: 11 | Status: SHIPPED | OUTPATIENT
Start: 2024-05-03

## 2024-05-09 ENCOUNTER — OFFICE VISIT (OUTPATIENT)
Dept: FAMILY MEDICINE | Facility: CLINIC | Age: 69
End: 2024-05-09
Payer: COMMERCIAL

## 2024-05-09 VITALS
HEIGHT: 56 IN | SYSTOLIC BLOOD PRESSURE: 135 MMHG | OXYGEN SATURATION: 98 % | RESPIRATION RATE: 16 BRPM | WEIGHT: 158 LBS | TEMPERATURE: 98.9 F | DIASTOLIC BLOOD PRESSURE: 62 MMHG | HEART RATE: 62 BPM | BODY MASS INDEX: 35.54 KG/M2

## 2024-05-09 DIAGNOSIS — Z79.4 TYPE 2 DIABETES MELLITUS WITH BOTH EYES AFFECTED BY MILD NONPROLIFERATIVE RETINOPATHY WITHOUT MACULAR EDEMA, WITH LONG-TERM CURRENT USE OF INSULIN (H): Primary | ICD-10-CM

## 2024-05-09 DIAGNOSIS — N18.32 STAGE 3B CHRONIC KIDNEY DISEASE (H): ICD-10-CM

## 2024-05-09 DIAGNOSIS — Z71.89 ADVANCED DIRECTIVES, COUNSELING/DISCUSSION: ICD-10-CM

## 2024-05-09 DIAGNOSIS — E11.3293 TYPE 2 DIABETES MELLITUS WITH BOTH EYES AFFECTED BY MILD NONPROLIFERATIVE RETINOPATHY WITHOUT MACULAR EDEMA, WITH LONG-TERM CURRENT USE OF INSULIN (H): Primary | ICD-10-CM

## 2024-05-09 LAB
ANION GAP SERPL CALCULATED.3IONS-SCNC: 12 MMOL/L (ref 7–15)
BUN SERPL-MCNC: 34.5 MG/DL (ref 8–23)
CALCIUM SERPL-MCNC: 9.3 MG/DL (ref 8.8–10.2)
CHLORIDE SERPL-SCNC: 103 MMOL/L (ref 98–107)
CREAT SERPL-MCNC: 1.98 MG/DL (ref 0.51–0.95)
DEPRECATED HCO3 PLAS-SCNC: 20 MMOL/L (ref 22–29)
EGFRCR SERPLBLD CKD-EPI 2021: 27 ML/MIN/1.73M2
GLUCOSE SERPL-MCNC: 162 MG/DL (ref 70–99)
HBA1C MFR BLD: 8 % (ref 0–5.6)
HGB BLD-MCNC: 11.8 G/DL (ref 11.7–15.7)
POTASSIUM SERPL-SCNC: 4.6 MMOL/L (ref 3.4–5.3)
SODIUM SERPL-SCNC: 135 MMOL/L (ref 135–145)

## 2024-05-09 PROCEDURE — 36415 COLL VENOUS BLD VENIPUNCTURE: CPT | Performed by: FAMILY MEDICINE

## 2024-05-09 PROCEDURE — 85018 HEMOGLOBIN: CPT | Performed by: FAMILY MEDICINE

## 2024-05-09 PROCEDURE — 83036 HEMOGLOBIN GLYCOSYLATED A1C: CPT | Performed by: FAMILY MEDICINE

## 2024-05-09 PROCEDURE — 80048 BASIC METABOLIC PNL TOTAL CA: CPT | Performed by: FAMILY MEDICINE

## 2024-05-09 PROCEDURE — 99215 OFFICE O/P EST HI 40 MIN: CPT | Performed by: FAMILY MEDICINE

## 2024-05-09 ASSESSMENT — PATIENT HEALTH QUESTIONNAIRE - PHQ9
SUM OF ALL RESPONSES TO PHQ QUESTIONS 1-9: 3
SUM OF ALL RESPONSES TO PHQ QUESTIONS 1-9: 3
10. IF YOU CHECKED OFF ANY PROBLEMS, HOW DIFFICULT HAVE THESE PROBLEMS MADE IT FOR YOU TO DO YOUR WORK, TAKE CARE OF THINGS AT HOME, OR GET ALONG WITH OTHER PEOPLE: VERY DIFFICULT

## 2024-05-09 NOTE — PROGRESS NOTES
"  Assessment & Plan     Type 2 diabetes mellitus with both eyes affected by mild nonproliferative retinopathy without macular edema, with long-term current use of insulin (H)  Elevation today in A1c,   Jardiance 10,   Lantus 38  Novolog 16    No changes to meds, did discuss checking 2 hours post prandial and consider greater range of novolog to match meal (consider 14-20 units)  - Hemoglobin A1c    Stage 3b chronic kidney disease (H)  Reviewed previous consultation as nearing STage 4 disease, benefits including education, greater prevention strategies, another set of eyes, patient declines referral at this time  - Hemoglobin  - Basic metabolic panel    Advanced directives, counseling/discussion  Confirmed and reviewed: NO interest in cpr, resuscitation or intubation, only oxygen mask and meds,  Confirmed and reviewed two children as decision makers and listed in the form      42 minutes spent by me on the date of the encounter doing chart review, history and exam, documentation and further activities per the note      BMI  Estimated body mass index is 35.42 kg/m  as calculated from the following:    Height as of this encounter: 1.422 m (4' 8\").    Weight as of this encounter: 71.7 kg (158 lb).           Recheck in 3-4 months for DM  Due for Preop for cataract when gets that scheduled.    No follow-ups on file.    Cornelia Anne is a 68 year old, presenting for the following health issues:  Diabetes    HPI   4[facility] Primary Care      Matters  Code Status: No Order     Advance Directives and Living Will: Not Received   ADVANCED CARE DIRECTIVES on file / No: POLST was reviewed with patient; patient declined at this time.         No data to display                 Medications  Medications of Risk:    Patient has no Opioid in Med List Patient has no Benzodiazepines on Med List Patient has no Antipsychotics on Med List  Number of medications on med list:    Med List Contains 10 or More Medications - Consider Medication " Review and Reconciliation    Mentation  Patient has cognitive impairment diagnosis on Problem List Patient has Depression on Problem List     PHQ-2 Score:       5/9/2024     8:00 AM 10/12/2023     8:06 AM   PHQ-2 ( 1999 [employer])   Q1: Little interest or pleasure in doing things 0 0   Q2: Feeling down, depressed or hopeless 0 0   PHQ-2 Score 0 0   Q1: Little interest or pleasure in doing things Not at all Not at all   Q2: Feeling down, depressed or hopeless Not at all Not at all   PHQ-2 Score 0 0      PHQ9x3       7/12/2023     8:14 AM 1/10/2024    10:17 AM 5/9/2024     8:00 AM   PHQ-9 SCORE   PHQ-9 Total Score MyChart   3 (Minimal depression)   PHQ-9 Total Score 11 4 3     Mini Cog:        No data to display                Mobility  Refreshable SmartLink to last Falls Risk assessment score and date:   No data recorded   Does Patient or Caregiver have mobility concerns for the patient?: NO  Adaptive Equipment/DME that patient uses: No DME    General Risk Score: 5   Values used to calculate this score:    Points  Metrics       1        Age: 68       0        Hospital Admissions: 0       0        ED Visits: 0       0        Has Chronic Obstructive Pulmonary Disease: No       1        Has Diabetes: Yes       0        Has Congestive Heart Failure: No       1        Has Liver Disease: Yes       1        Has Depression: Yes       0        Current PCP: Shannan Benson MD       1        Has Medicaid: Yes     Patient Health status: Chronically ill: functionally independent, greater or equal than three chronic conditions    Diabetes Follow-up  Lantus: 38  Novolog 16 with meals (range 16-18  How often are you checking your blood sugar? Not at all  What concerns do you have today about your diabetes? VISION: sees eye specialist who recommends cataract surgery   Do you have any of these symptoms? (Select all that apply)  Blurry vision  Can't drive at night, waiting for surgery      BP Readings from Last 2 Encounters:  "  05/09/24 135/62   03/19/24 (!) 151/69     Hemoglobin A1C (%)   Date Value   01/10/2024 7.2 (H)   10/12/2023 6.8 (H)   06/03/2021 6.8 (H)   03/02/2021 7.3 (H)     LDL Cholesterol Calculated (mg/dL)   Date Value   03/19/2024 52   03/02/2023 63   07/24/2012 83   11/08/2011 98     LDL Cholesterol Direct (mg/dL)   Date Value   10/02/2020 52.0   03/08/2019 49.0             Hypertension Follow-up    Do you check your blood pressure regularly outside of the clinic? Yes   Are you following a low salt diet? Yes  Are your blood pressures ever more than 140 on the top number (systolic) OR more   than 90 on the bottom number (diastolic), for example 140/90? Yes    How many days per week do you miss taking your medication? 0            Objective    /62   Pulse 62   Temp 98.9  F (37.2  C) (Tympanic)   Resp 16   Ht 1.422 m (4' 8\")   Wt 71.7 kg (158 lb)   SpO2 98%   BMI 35.42 kg/m    Body mass index is 35.42 kg/m .  Physical Exam   GENERAL: alert and no distress  PSYCH: mentation appears normal, affect normal/bright    Results for orders placed or performed in visit on 05/09/24 (from the past 24 hour(s))   Hemoglobin A1c   Result Value Ref Range    Hemoglobin A1C 8.0 (H) 0.0 - 5.6 %   Hemoglobin   Result Value Ref Range    Hemoglobin 11.8 11.7 - 15.7 g/dL           Signed Electronically by: Shannan Benson MD    Answers submitted by the patient for this visit:  Patient Health Questionnaire (Submitted on 5/9/2024)  If you checked off any problems, how difficult have these problems made it for you to do your work, take care of things at home, or get along with other people?: Very difficult  PHQ9 TOTAL SCORE: 3    "

## 2024-05-10 NOTE — RESULT ENCOUNTER NOTE
Call patient:    Kidneys are another slight decline.  At visit we had talked about referring to nephrology and she wanted to wait.  I would encourage to go this summer to meet at the kidney clinic to ensure kidneys remain with good function.      If okay I'll place another referral to nephrology clinic.  Shannan Benson MD

## 2024-05-13 NOTE — RESULT ENCOUNTER NOTE
Writer called Omid, had about 10 mins phone conversation with patient explained kidney results and stages of kidney disease to explore patient's understanding of her given stage, 3b chronic kidney disease. Omid verbalized understanding and would like to still defer seeing Nephrology at this time. Will consider and keep in mind of need to see Nephrology in the future. Writer encouraged patient to maintain good hydration, reviewed red flag symptoms- extremity edematous, urine color changes with Omid to continue monitoring. Omid states she will call the clinic if there are any changes or concerns. Shaun FELICIANO

## 2024-06-04 DIAGNOSIS — Z79.4 TYPE 2 DIABETES MELLITUS WITH DIABETIC POLYNEUROPATHY, WITH LONG-TERM CURRENT USE OF INSULIN (H): ICD-10-CM

## 2024-06-04 DIAGNOSIS — E11.42 TYPE 2 DIABETES MELLITUS WITH DIABETIC POLYNEUROPATHY, WITH LONG-TERM CURRENT USE OF INSULIN (H): ICD-10-CM

## 2024-06-04 RX ORDER — INSULIN ASPART 100 [IU]/ML
INJECTION, SOLUTION INTRAVENOUS; SUBCUTANEOUS
Qty: 45 ML | Refills: 3 | Status: SHIPPED | OUTPATIENT
Start: 2024-06-04 | End: 2024-08-13

## 2024-06-20 ENCOUNTER — OFFICE VISIT (OUTPATIENT)
Dept: FAMILY MEDICINE | Facility: CLINIC | Age: 69
End: 2024-06-20
Payer: COMMERCIAL

## 2024-06-20 VITALS
HEIGHT: 55 IN | HEART RATE: 56 BPM | OXYGEN SATURATION: 98 % | BODY MASS INDEX: 36.98 KG/M2 | DIASTOLIC BLOOD PRESSURE: 62 MMHG | TEMPERATURE: 98 F | SYSTOLIC BLOOD PRESSURE: 135 MMHG | WEIGHT: 159.8 LBS | RESPIRATION RATE: 16 BRPM

## 2024-06-20 DIAGNOSIS — H25.9 AGE-RELATED CATARACT OF BOTH EYES, UNSPECIFIED AGE-RELATED CATARACT TYPE: Primary | ICD-10-CM

## 2024-06-20 DIAGNOSIS — Z79.4 TYPE 2 DIABETES MELLITUS WITH BOTH EYES AFFECTED BY MILD NONPROLIFERATIVE RETINOPATHY WITHOUT MACULAR EDEMA, WITH LONG-TERM CURRENT USE OF INSULIN (H): ICD-10-CM

## 2024-06-20 DIAGNOSIS — E11.3293 TYPE 2 DIABETES MELLITUS WITH BOTH EYES AFFECTED BY MILD NONPROLIFERATIVE RETINOPATHY WITHOUT MACULAR EDEMA, WITH LONG-TERM CURRENT USE OF INSULIN (H): ICD-10-CM

## 2024-06-20 PROCEDURE — 99214 OFFICE O/P EST MOD 30 MIN: CPT | Performed by: FAMILY MEDICINE

## 2024-06-20 PROCEDURE — G2211 COMPLEX E/M VISIT ADD ON: HCPCS | Performed by: FAMILY MEDICINE

## 2024-06-20 RX ORDER — CAPSAICIN 0.025 %
CREAM (GRAM) TOPICAL
COMMUNITY
Start: 2024-06-04

## 2024-06-20 NOTE — PROGRESS NOTES
Preoperative Evaluation  [facility] [address]  [place] [address]  Phone: [phone]  Fax: [phone]  Primary Provider: Shannan Benson MD  Pre-op Performing Provider: Shannan Benson MD  Jun 20, 2024 6/20/2024   Surgical Information   What procedure is being done? cataract surgery   Facility or Hospital where procedure/surgery will be performed: [facility] [place]   Who is doing the procedure / surgery? yeimy melvin   Date of surgery / procedure: june 24   Time of surgery / procedure: 11   Where do you plan to recover after surgery? at home with family        Fax number for surgical facility: [phone]    Assessment & Plan     The proposed surgical procedure is considered LOW risk.    (H25.9) Age-related cataract of both eyes, unspecified age-related cataract type  (primary encounter diagnosis)  Comment: Proceed with surgery is fine  Plan: Discussed if patient is to be NPO in AM and will call if has any questions about insulin in the morning    (E11.3293,  Z79.4) Type 2 diabetes mellitus with both eyes affected by mild nonproliferative retinopathy without macular edema, with long-term current use of insulin (H)  Comment: continue meds the evening before  Plan: no AM insulin if morning NPO          - No identified additional risk factors other than previously addressed         Recommendation  Approval given to proceed with proposed procedure, without further diagnostic evaluation.    Cornelia Anne is a 68 year old, presenting for the following:  Pre-Op Exam (Left Eye surgery)          6/20/2024    10:55 AM   Additional Questions   Roomed by Trae         6/20/2024    Information    services provided? Yes   Language Hmong   Type of interpretation provided Face-to-face    name Sofia Mendenhallo Her    Agency [employer]      HPI related to upcoming procedure: Follows with ophthalmology and ready  now for bilateral cataract        6/20/2024   Pre-Op Questionnaire   Have you ever had a heart attack or stroke? No   Have you ever had surgery on your heart or blood vessels, such as a stent placement, a coronary artery bypass, or surgery on an artery in your head, neck, heart, or legs? No   Do you have chest pain with activity? No   Do you have a history of heart failure? No   Do you currently have a cold, bronchitis or symptoms of other infection? No   Do you have a cough, shortness of breath, or wheezing? No   Do you or anyone in your family have previous history of blood clots? No   Do you or does anyone in your family have a serious bleeding problem such as prolonged bleeding following surgeries or cuts? No   Have you ever had problems with anemia or been told to take iron pills? No   Have you had any abnormal blood loss such as black, tarry or bloody stools, or abnormal vaginal bleeding? No   Have you ever had a blood transfusion? No   Are you willing to have a blood transfusion if it is medically needed before, during, or after your surgery? Yes   Have you or any of your relatives ever had problems with anesthesia? No   Do you have sleep apnea, excessive snoring or daytime drowsiness? No   Do you have any artifical heart valves or other implanted medical devices like a pacemaker, defibrillator, or continuous glucose monitor? No   Do you have artificial joints? No   Are you allergic to latex? No        Health Care Directive  Patient has a Health Care Directive on file      Preoperative Review of         Status of Chronic Conditions:  DIABETES - Patient has a longstanding history of DiabetesType Type II . Patient is being treated with oral agents and insulin injections and denies significant side effects. Control has been good. Complicating factors include but are not limited to: hypertension, hyperlipidemia, neuropathy, chronic kidney disease, and morbid obesity .     HYPERTENSION - Patient has  longstanding history of HTN , currently denies any symptoms referable to elevated blood pressure. Specifically denies chest pain, palpitations, dyspnea, orthopnea, PND or peripheral edema. Blood pressure readings have been in normal range. Current medication regimen is as listed below. Patient denies any side effects of medication.     Patient Active Problem List    Diagnosis Date Noted    Type 2 diabetes mellitus with both eyes affected by mild nonproliferative retinopathy without macular edema, with long-term current use of insulin (H) 11/30/2016     Priority: High     ASCVD risk: elevated unable to calculate: on atorvastatin 40    A1c>9  On victoza, invokana, lantus and glipizide: next visit increase invokana if needed and nl renal fxn  1/9/2017          Glaucoma of both eyes associated with underlying disease 10/24/2013     Priority: High    Stage 3b chronic kidney disease (H) 12/20/2012     Priority: High    Positive FIT (fecal immunochemical test) 02/20/2023     Priority: Medium    Sinus bradycardia 05/17/2022     Priority: Medium    Morbid obesity (H) 11/10/2020     Priority: Medium    Other lymphedema 05/23/2014     Priority: Medium    Dyspepsia 05/23/2013     Priority: Medium    Hepatitis B carrier (H) 12/20/2012     Priority: Medium     Needs Labs yearly and q 6 month ultrasound   AFP and liver ultrasound to screen for HCC every 6 months ((January and July)      Pure hypercholesterolemia 12/20/2012     Priority: Medium    Essential hypertension 12/20/2012     Priority: Medium     Problem list name updated by automated process. Provider to review      Vitamin D deficiency 12/20/2012     Priority: Medium     Problem list name updated by automated process. 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 TB clinic for 6 months -repeated cxr and sputums   exposure to TB 2017.  Declined repeat TB treatment and on CXR monitoring.  Reason for call: FYI: The patient was re expose 12/5/18, recommend 2 years of active monitoring that consist of every 6 month being evaluated by a doctor with a  Symptoms screen and chest xray. The Patient came to their     Major depressive disorder, recurrent episode, moderate (H)     Major depressive disorder, recurrent episode, unspecified 12/20/2012    Obesity, unspecified 12/20/2012    Pure hypercholesterolemia 12/20/2012    Rheumatoid arthritis involving both hands with positive rheumatoid factor (H) 02/24/2016    Rheumatoid arthritis(714.0) 12/20/2012    Type II or unspecified type diabetes mellitus with unspecified complication, uncontrolled 12/20/2012    Onset date: 1990     Unspecified essential hypertension 12/20/2012    Unspecified glaucoma(365.9) 12/20/2012    Walking troubles      Past Surgical History:   Procedure Laterality Date    COLONOSCOPY N/A 6/8/2023    Procedure: COLONOSCOPY WITH POLYPECTOMY;  Surgeon: Manuel Gross MD;  Location: [facility] OR    NO HISTORY OF SURGERY       Current Outpatient Medications   Medication Sig Dispense Refill    capsaicin (ZOSTRIX) 0.025 % external cream APPLY 2GRAMS TO BOTH FEET EVERY DAY AS NEEDED FOR PAIN      acetaminophen (TYLENOL) 325 MG tablet TAKE 2 TABLETS(650 MG) BY MOUTH FOUR TIMES DAILY 100 tablet 3    amLODIPine (NORVASC) 10 MG tablet Take 1 tablet (10 mg) by mouth every morning 90 tablet 3    ammonium lactate (AMLACTIN) 12 % external cream Apply topically 2 times daily 140 g 3    ASPIRIN LOW DOSE 81 MG EC tablet Take 1 tablet (81 mg) by mouth daily 90 tablet 3    blood glucose  (ACCU-CHEK CHIKA) test strip 360 strips by In Vitro route 4 times daily (before meals and nightly) Use to test blood sugar 3 times daily or as directed. 300 strip 4    blood glucose monitoring (ACCU-CHEK MULTICLIX) lancets Use to test blood sugar 3 times daily or as directed. 100 each 3    empagliflozin (JARDIANCE) 10 MG TABS tablet Take 1 tablet (10 mg) by mouth daily 90 tablet 3    fluticasone (FLONASE) 50 MCG/ACT nasal spray SHAKE LIQUID AND USE 1 SPRAY IN EACH NOSTRIL DAILY 16 g 3    glucose (BD GLUCOSE) 4 g chewable tablet Take 1 tablet by mouth every hour as needed for low blood sugar 10 tablet 4    hydrochlorothiazide (HYDRODIURIL) 25 MG tablet TAKE 1 TABLET(25 MG) BY MOUTH DAILY 90 tablet 3    Insulin Aspart FlexPen 100 UNIT/ML SOPN INJECT 16-18 UNITS WITH MEALS, 10 UNITS WITH LATE MEAL OR SMALL MEAL 45 mL 3    insulin pen needle (B-D U/F) 31G X 8 MM miscellaneous USE FOR INSULIN INJECT FOUR TIMES DAILY. 180 each 3    latanoprost (XALATAN) 0.005 % ophthalmic solution INSTILL 1 DROP INTO THE AFFECTED EYES ONCE DAILY AS DIRECTED. 5 mL 11    LEVEMIR FLEXPEN/FLEXTOUCH 100 UNIT/ML soln ADMINISTER 48 UNITS UNDER THE SKIN AT BEDTIME 30 mL 3    liraglutide (VICTOZA) 18 MG/3ML solution Inject 1.8 mg Subcutaneous daily 27 mL 1    losartan (COZAAR) 100 MG tablet Take 1 tablet (100 mg) by mouth daily 90 tablet 3    omeprazole (PRILOSEC) 20 MG DR capsule Take 1 capsule (20 mg) by mouth daily 90 capsule 3    order for DME Equipment being ordered:     Massage tube, freeze and use to massage heel twice daily 1 Device 0    rosuvastatin (CRESTOR) 10 MG tablet Take 1 tablet (10 mg) by mouth daily 90 tablet 3    VITAMIN D3 25 MCG (1000 UT) tablet Take 1 tablet (25 mcg) by mouth daily 90 tablet 3       Allergies   Allergen Reactions    Lisinopril Cough        Social History     Tobacco Use    Smoking status: Never     Passive exposure: Never    Smokeless tobacco: Never    Tobacco comments:     no exposure at home per pt  "  Substance Use Topics    Alcohol use: No     Alcohol/week: 0.0 standard drinks of alcohol     Family History   Problem Relation Age of Onset    No Known Problems Mother     No Known Problems Father     No Known Problems Sister     No Known Problems Daughter     No Known Problems Maternal Grandmother     No Known Problems Maternal Grandfather     No Known Problems Paternal Grandmother     No Known Problems Paternal Grandfather     No Known Problems Maternal Aunt     No Known Problems Paternal Aunt     Cancer No family hx of     Diabetes No family hx of     Cardiovascular No family hx of     Coronary Artery Disease No family hx of     Cerebrovascular Disease No family hx of     Breast Cancer No family hx of     Colon Cancer No family hx of     Other Cancer No family hx of     Prostate Cancer No family hx of     Hypertension No family hx of     Asthma No family hx of     Hereditary Breast and Ovarian Cancer Syndrome No family hx of     Endometrial Cancer No family hx of     Ovarian Cancer No family hx of      History   Drug Use No             Review of Systems  Constitutional, HEENT, cardiovascular, pulmonary, gi and gu systems are negative, except as otherwise noted.    Objective    /62   Pulse 56   Temp 98  F (36.7  C)   Resp 16   Ht 1.407 m (4' 7.39\")   Wt 72.5 kg (159 lb 12.8 oz)   SpO2 98%   BMI 36.61 kg/m     Estimated body mass index is 36.61 kg/m  as calculated from the following:    Height as of this encounter: 1.407 m (4' 7.39\").    Weight as of this encounter: 72.5 kg (159 lb 12.8 oz).  Physical Exam  GENERAL: alert and no distress  EYES: Eyes grossly normal to inspection, PERRL and conjunctivae and sclerae normal  NECK: no adenopathy, no asymmetry, masses, or scars  RESP: lungs clear to auscultation - no rales, rhonchi or wheezes  CV: regular rate and rhythm, normal S1 S2, no S3 or S4, no murmur, click or rub, no peripheral edema  ABDOMEN: soft, nontender, no hepatosplenomegaly, no masses and " bowel sounds normal  ABDOMEN: soft, nontender, without hepatosplenomegaly or masses, bowel sounds normal, and bilateral lower abdominal subcutaneous firm fatty sclerotic areas consistent with insulin injection reactions and skin hyperpigmentation   MS: no gross musculoskeletal defects noted, no edema  PSYCH: mentation appears normal, affect normal/bright    Recent Labs   Lab Test 05/09/24  0757 01/10/24  0937 10/12/23  0759 07/12/23  0801   HGB 11.8  --   --  11.0*    136  --  140   POTASSIUM 4.6 4.6  --  4.6   CR 1.98* 1.77*  --  1.76*   A1C 8.0* 7.2*   < > 6.7*    < > = values in this interval not displayed.        Diagnostics  No labs were ordered during this visit.   No EKG required for low risk surgery (cataract, skin procedure, breast biopsy, etc).    Revised Cardiac Risk Index (RCRI)  The patient has the following serious cardiovascular risks for perioperative complications:   - No serious cardiac risks = 0 points     RCRI Interpretation: 1 point: Class II (low risk - 0.9% complication rate)     The longitudinal plan of care for the diagnosis(es)/condition(s) as documented were addressed during this visit. Due to the added complexity in care, I will continue to support Anne in the subsequent management and with ongoing continuity of care.    Signed Electronically by: Shannan Benson MD  Copy of this evaluation report is provided to requesting physician.

## 2024-06-23 DIAGNOSIS — Z79.4 TYPE 2 DIABETES MELLITUS WITH DIABETIC POLYNEUROPATHY, WITH LONG-TERM CURRENT USE OF INSULIN (H): ICD-10-CM

## 2024-06-23 DIAGNOSIS — E11.42 TYPE 2 DIABETES MELLITUS WITH DIABETIC POLYNEUROPATHY, WITH LONG-TERM CURRENT USE OF INSULIN (H): ICD-10-CM

## 2024-06-24 RX ORDER — ASPIRIN 81 MG/1
81 TABLET, COATED ORAL DAILY
Qty: 90 TABLET | Refills: 3 | Status: SHIPPED | OUTPATIENT
Start: 2024-06-24

## 2024-07-28 DIAGNOSIS — I10 ESSENTIAL HYPERTENSION: ICD-10-CM

## 2024-07-29 RX ORDER — AMLODIPINE BESYLATE 10 MG/1
10 TABLET ORAL EVERY MORNING
Qty: 90 TABLET | Refills: 0 | Status: SHIPPED | OUTPATIENT
Start: 2024-07-29

## 2024-07-29 NOTE — TELEPHONE ENCOUNTER
Sending for 90 days of medication per protocol to allow time for an appointment, needs updated GFR. Patient has appointment scheduled 08/13/24. GFR pended for the appointment. Navid FELICIANO

## 2024-07-29 NOTE — PROGRESS NOTES
Patient due for GFR per amlodipine refill request. Pended GFR. 90 days of amlodipine sent to pharmacy per protocol. Navid FELICIANO

## 2024-07-30 ENCOUNTER — TRANSFERRED RECORDS (OUTPATIENT)
Dept: HEALTH INFORMATION MANAGEMENT | Facility: CLINIC | Age: 69
End: 2024-07-30
Payer: COMMERCIAL

## 2024-08-13 ENCOUNTER — OFFICE VISIT (OUTPATIENT)
Dept: FAMILY MEDICINE | Facility: CLINIC | Age: 69
End: 2024-08-13
Payer: COMMERCIAL

## 2024-08-13 VITALS
HEIGHT: 55 IN | BODY MASS INDEX: 35.87 KG/M2 | DIASTOLIC BLOOD PRESSURE: 70 MMHG | WEIGHT: 155 LBS | SYSTOLIC BLOOD PRESSURE: 126 MMHG | OXYGEN SATURATION: 97 % | TEMPERATURE: 98.4 F | RESPIRATION RATE: 20 BRPM | HEART RATE: 55 BPM

## 2024-08-13 DIAGNOSIS — Z79.4 TYPE 2 DIABETES MELLITUS WITH DIABETIC POLYNEUROPATHY, WITH LONG-TERM CURRENT USE OF INSULIN (H): ICD-10-CM

## 2024-08-13 DIAGNOSIS — N18.4 CKD (CHRONIC KIDNEY DISEASE) STAGE 4, GFR 15-29 ML/MIN (H): ICD-10-CM

## 2024-08-13 DIAGNOSIS — I10 ESSENTIAL HYPERTENSION: Primary | ICD-10-CM

## 2024-08-13 DIAGNOSIS — E11.42 TYPE 2 DIABETES MELLITUS WITH DIABETIC POLYNEUROPATHY, WITH LONG-TERM CURRENT USE OF INSULIN (H): ICD-10-CM

## 2024-08-13 LAB
ANION GAP SERPL CALCULATED.3IONS-SCNC: 9 MMOL/L (ref 7–15)
BUN SERPL-MCNC: 61.1 MG/DL (ref 8–23)
CALCIUM SERPL-MCNC: 9.1 MG/DL (ref 8.8–10.4)
CHLORIDE SERPL-SCNC: 109 MMOL/L (ref 98–107)
CREAT SERPL-MCNC: 2.01 MG/DL (ref 0.51–0.95)
EGFRCR SERPLBLD CKD-EPI 2021: 26 ML/MIN/1.73M2
GLUCOSE SERPL-MCNC: 143 MG/DL (ref 70–99)
HBA1C MFR BLD: 8.6 % (ref 0–5.6)
HCO3 SERPL-SCNC: 19 MMOL/L (ref 22–29)
POTASSIUM SERPL-SCNC: 5.3 MMOL/L (ref 3.4–5.3)
SODIUM SERPL-SCNC: 137 MMOL/L (ref 135–145)

## 2024-08-13 PROCEDURE — 99214 OFFICE O/P EST MOD 30 MIN: CPT | Performed by: FAMILY MEDICINE

## 2024-08-13 PROCEDURE — 36415 COLL VENOUS BLD VENIPUNCTURE: CPT | Performed by: FAMILY MEDICINE

## 2024-08-13 PROCEDURE — G2211 COMPLEX E/M VISIT ADD ON: HCPCS | Performed by: FAMILY MEDICINE

## 2024-08-13 PROCEDURE — 80048 BASIC METABOLIC PNL TOTAL CA: CPT | Performed by: FAMILY MEDICINE

## 2024-08-13 PROCEDURE — 83036 HEMOGLOBIN GLYCOSYLATED A1C: CPT | Performed by: FAMILY MEDICINE

## 2024-08-13 RX ORDER — INSULIN ASPART 100 [IU]/ML
18-20 INJECTION, SOLUTION INTRAVENOUS; SUBCUTANEOUS
COMMUNITY
Start: 2024-08-13

## 2024-08-13 ASSESSMENT — PATIENT HEALTH QUESTIONNAIRE - PHQ9
10. IF YOU CHECKED OFF ANY PROBLEMS, HOW DIFFICULT HAVE THESE PROBLEMS MADE IT FOR YOU TO DO YOUR WORK, TAKE CARE OF THINGS AT HOME, OR GET ALONG WITH OTHER PEOPLE: VERY DIFFICULT
SUM OF ALL RESPONSES TO PHQ QUESTIONS 1-9: 7
SUM OF ALL RESPONSES TO PHQ QUESTIONS 1-9: 7

## 2024-08-14 PROBLEM — N18.4 CKD (CHRONIC KIDNEY DISEASE) STAGE 4, GFR 15-29 ML/MIN (H): Status: ACTIVE | Noted: 2024-08-14

## 2024-08-14 RX ORDER — INSULIN DETEMIR 100 [IU]/ML
40 INJECTION, SOLUTION SUBCUTANEOUS AT BEDTIME
COMMUNITY
Start: 2024-08-14

## 2024-08-14 NOTE — RESULT ENCOUNTER NOTE
Call patient:    Her kidney function is not improving and again urge her to visit with a kidney specialist.  The visit includes mainly lots of education about preserving your kidney function and planning ahead.  They may not change much for medications except some nutritional advice as well.  I would like to put in another referral  Shannan Benson MD

## 2024-08-14 NOTE — PROGRESS NOTES
"  Assessment & Plan     (I10) Essential hypertension  (primary encounter diagnosis)  Comment: controlled, losartan, amlodipine and hydrochlorothiazide   Plan: Basic metabolic panel, Hemoglobin A1c        No changes to meds based on BP control    (E11.42,  Z79.4) Type 2 diabetes mellitus with diabetic polyneuropathy, with long-term current use of insulin (H)  Comment: not at goal: increase the lantus and increase the novolog, remain on jardiance and victoza  Plan: Insulin Aspart FlexPen 100 UNIT/ML SOPN        Lantus to increase to 40 units (had been 38) and Novolog with meals increase (18-20 units) had been 16-18.    (N18.4) CKD (chronic kidney disease) stage 4, GFR 15-29 ml/min (H)  Comment: patient has declined nephrology referral and will watch closely  Plan: currently not improved slight decline and will encourage nephrology consultation.      The longitudinal plan of care for the diagnosis(es)/condition(s) as documented were addressed during this visit. Due to the added complexity in care, I will continue to support Omid in the subsequent management and with ongoing continuity of care.  BMI  Estimated body mass index is 35.87 kg/m  as calculated from the following:    Height as of this encounter: 1.4 m (4' 7.12\").    Weight as of this encounter: 70.3 kg (155 lb).             No follow-ups on file.    Cornelia Anne is a 69 year old, presenting for the following health issues:  Diabetes (check)      8/13/2024     9:56 AM   Additional Questions   Roomed by Cisco         8/13/2024    Information    services provided? Yes   Language Rafong   Type of interpretation provided Face-to-face    name Salvador Santos    Agency [employer]        [facility]       Diabetes Follow-up    How often are you checking your blood sugar? Daily and sometimes twice, am fasting 130s and post meal higher  What concerns do you have today about your diabetes? None   Do you have any of these symptoms? (Select " "all that apply)  no changes    HTN: taking meds without issues and states BP is fine at home        BP Readings from Last 2 Encounters:   08/13/24 126/70   06/20/24 135/62     Hemoglobin A1C (%)   Date Value   08/13/2024 8.6 (H)   05/09/2024 8.0 (H)   06/03/2021 6.8 (H)   03/02/2021 7.3 (H)     LDL Cholesterol Calculated (mg/dL)   Date Value   03/19/2024 52   03/02/2023 63   07/24/2012 83   11/08/2011 98     LDL Cholesterol Direct (mg/dL)   Date Value   10/02/2020 52.0   03/08/2019 49.0           Objective    /70   Pulse 55   Temp 98.4  F (36.9  C)   Resp 20   Ht 1.4 m (4' 7.12\")   Wt 70.3 kg (155 lb)   SpO2 97%   BMI 35.87 kg/m    Body mass index is 35.87 kg/m .  Physical Exam   GENERAL: alert and no distress  CV: regular rate and rhythm, normal S1 S2, no S3 or S4, no murmur, click or rub, no peripheral edema  ABDOMEN: soft, nontender, no hepatosplenomegaly, no masses and bowel sounds normal  PSYCH: mentation appears normal, affect normal/bright    Results for orders placed or performed in visit on 08/13/24   Basic metabolic panel     Status: Abnormal   Result Value Ref Range    Sodium 137 135 - 145 mmol/L    Potassium 5.3 3.4 - 5.3 mmol/L    Chloride 109 (H) 98 - 107 mmol/L    Carbon Dioxide (CO2) 19 (L) 22 - 29 mmol/L    Anion Gap 9 7 - 15 mmol/L    Urea Nitrogen 61.1 (H) 8.0 - 23.0 mg/dL    Creatinine 2.01 (H) 0.51 - 0.95 mg/dL    GFR Estimate 26 (L) >60 mL/min/1.73m2    Calcium 9.1 8.8 - 10.4 mg/dL    Glucose 143 (H) 70 - 99 mg/dL   Hemoglobin A1c     Status: Abnormal   Result Value Ref Range    Hemoglobin A1C 8.6 (H) 0.0 - 5.6 %    Narrative    Results consistent with previous, repeat testing unnecessary             Signed Electronically by: Shannan Benson MD    Answers submitted by the patient for this visit:  Patient Health Questionnaire (Submitted on 8/13/2024)  If you checked off any problems, how difficult have these problems made it for you to do your work, take care of things at home, " or get along with other people?: Very difficult  PHQ9 TOTAL SCORE: 7

## 2024-08-19 NOTE — RESULT ENCOUNTER NOTE
Writer called, spoke with patient. She would like to defer seeing Nephrology until after she follows up and see Dr Benson on 9/12/2024. Shaun FELICIANO

## 2024-09-10 NOTE — NURSING NOTE
Reminded patient to complete and bring back FIT test.     name: Courtney Wilson  Language: Hmong  Agency: [employer]  Phone number: [phone]     There are no Wet Read(s) to document.

## 2024-09-12 ENCOUNTER — OFFICE VISIT (OUTPATIENT)
Dept: FAMILY MEDICINE | Facility: CLINIC | Age: 69
End: 2024-09-12
Payer: COMMERCIAL

## 2024-09-12 VITALS
DIASTOLIC BLOOD PRESSURE: 52 MMHG | OXYGEN SATURATION: 99 % | TEMPERATURE: 98.5 F | HEART RATE: 51 BPM | SYSTOLIC BLOOD PRESSURE: 130 MMHG | BODY MASS INDEX: 35.09 KG/M2 | RESPIRATION RATE: 20 BRPM | HEIGHT: 56 IN | WEIGHT: 156 LBS

## 2024-09-12 DIAGNOSIS — E11.3293 TYPE 2 DIABETES MELLITUS WITH BOTH EYES AFFECTED BY MILD NONPROLIFERATIVE RETINOPATHY WITHOUT MACULAR EDEMA, WITH LONG-TERM CURRENT USE OF INSULIN (H): Primary | ICD-10-CM

## 2024-09-12 DIAGNOSIS — Z79.4 TYPE 2 DIABETES MELLITUS WITH BOTH EYES AFFECTED BY MILD NONPROLIFERATIVE RETINOPATHY WITHOUT MACULAR EDEMA, WITH LONG-TERM CURRENT USE OF INSULIN (H): Primary | ICD-10-CM

## 2024-09-12 DIAGNOSIS — M06.4 INFLAMMATORY POLYARTHROPATHY (H): ICD-10-CM

## 2024-09-12 DIAGNOSIS — Z23 ENCOUNTER FOR ADMINISTRATION OF VACCINE: ICD-10-CM

## 2024-09-12 DIAGNOSIS — Z79.4 TYPE 2 DIABETES MELLITUS WITH DIABETIC POLYNEUROPATHY, WITH LONG-TERM CURRENT USE OF INSULIN (H): ICD-10-CM

## 2024-09-12 DIAGNOSIS — N18.4 CKD (CHRONIC KIDNEY DISEASE) STAGE 4, GFR 15-29 ML/MIN (H): ICD-10-CM

## 2024-09-12 DIAGNOSIS — E11.42 TYPE 2 DIABETES MELLITUS WITH DIABETIC POLYNEUROPATHY, WITH LONG-TERM CURRENT USE OF INSULIN (H): ICD-10-CM

## 2024-09-12 LAB
CREAT UR-MCNC: 60 MG/DL
HGB BLD-MCNC: 11.4 G/DL (ref 11.7–15.7)
MICROALBUMIN UR-MCNC: 25.8 MG/L
MICROALBUMIN/CREAT UR: 43 MG/G CR (ref 0–25)

## 2024-09-12 PROCEDURE — 82043 UR ALBUMIN QUANTITATIVE: CPT | Performed by: FAMILY MEDICINE

## 2024-09-12 PROCEDURE — 99214 OFFICE O/P EST MOD 30 MIN: CPT | Mod: 25 | Performed by: FAMILY MEDICINE

## 2024-09-12 PROCEDURE — 82570 ASSAY OF URINE CREATININE: CPT | Performed by: FAMILY MEDICINE

## 2024-09-12 PROCEDURE — 36415 COLL VENOUS BLD VENIPUNCTURE: CPT | Performed by: FAMILY MEDICINE

## 2024-09-12 PROCEDURE — 85018 HEMOGLOBIN: CPT | Performed by: FAMILY MEDICINE

## 2024-09-12 PROCEDURE — 90662 IIV NO PRSV INCREASED AG IM: CPT | Performed by: FAMILY MEDICINE

## 2024-09-12 PROCEDURE — 90471 IMMUNIZATION ADMIN: CPT | Performed by: FAMILY MEDICINE

## 2024-09-12 RX ORDER — LIRAGLUTIDE 6 MG/ML
1.8 INJECTION SUBCUTANEOUS DAILY
Qty: 27 ML | Refills: 1 | Status: SHIPPED | OUTPATIENT
Start: 2024-09-12 | End: 2024-09-16

## 2024-09-12 NOTE — LETTER
September 17, 2024      Omid Adorno  [address] [place] [address]        Dear ,    We are writing to inform you of your test results.    Blood is stable and kidneys are remaining stable.  No changes to medications based on these levels.     Resulted Orders   Albumin Random Urine Quantitative with Creat Ratio   Result Value Ref Range    Creatinine Urine mg/dL 60.0 mg/dL      Comment:      The reference ranges have not been established in urine creatinine. The results should be integrated into the clinical context for interpretation.    Albumin Urine mg/L 25.8 mg/L      Comment:      The reference ranges have not been established in urine albumin. The results should be integrated into the clinical context for interpretation.    Albumin Urine mg/g Cr 43.00 (H) 0.00 - 25.00 mg/g Cr      Comment:      Microalbuminuria is defined as an albumin:creatinine ratio of 17 to 299 for males and 25 to 299 for females. A ratio of albumin:creatinine of 300 or higher is indicative of overt proteinuria.  Due to biologic variability, positive results should be confirmed by a second, first-morning random or 24-hour timed urine specimen. If there is discrepancy, a third specimen is recommended. When 2 out of 3 results are in the microalbuminuria range, this is evidence for incipient nephropathy and warrants increased efforts at glucose control, blood pressure control, and institution of therapy with an angiotensin-converting-enzyme (ACE) inhibitor (if the patient can tolerate it).     Hemoglobin   Result Value Ref Range    Hemoglobin 11.4 (L) 11.7 - 15.7 g/dL       If you have any questions or concerns, please call the clinic at the number listed above.       Sincerely,      Shannan Benson MD

## 2024-09-12 NOTE — PROGRESS NOTES
"  Assessment & Plan     (E11.3293,  Z79.4) Type 2 diabetes mellitus with both eyes affected by mild nonproliferative retinopathy without macular edema, with long-term current use of insulin (H)  (primary encounter diagnosis)  Comment: will refill same victoza, some prior authorization might be needed  Plan: Albumin Random Urine Quantitative with Creat         Ratio, ROUTINE FOOT CARE BY A PHYSICIAN OF A         DIABETIC PATIENT WITH DIABETIC SENSORY        Seems insulin resistance driving mealtime insulin needs, unsure if needing more lantus, will need reassess by November    Lab Results   Component Value Date    A1C 8.6 08/13/2024    A1C 8.0 05/09/2024    A1C 7.2 01/10/2024    A1C 6.8 10/12/2023    A1C 6.7 07/12/2023    A1C 6.8 06/03/2021    A1C 7.3 03/02/2021    A1C 8.5 10/02/2020    A1C 8.2 06/18/2020    A1C 7.4 01/28/2020         (E11.42,  Z79.4) Type 2 diabetes mellitus with diabetic polyneuropathy, with long-term current use of insulin (H)  Comment: adjust if needed  Plan: liraglutide (VICTOZA) 18 MG/3ML solution            (N18.4) CKD (chronic kidney disease) stage 4, GFR 15-29 ml/min (H)  Comment: patient agrees to nephrology referral  Plan: Hemoglobin, Adult Nephrology  Referral        Worries about further testing, further meds and if that will worsen kidneys, provided reassurance and rationale for watching and caring for kidneys.  DM, CKD and arthritis pain all interacting and complex care.    (Z23) Encounter for administration of vaccine  Comment: agree  Plan: INFLUENZA HIGH DOSE, TRIVALENT, PF (FLUZONE)            (M06.4) Inflammatory polyarthropathy (H)  Comment: patient interested in referral unsure if new medications ay assist as trying to avoid any nephrotoxic pain meds  Plan: Adult Rheumatology  Referral            BMI  Estimated body mass index is 34.6 kg/m  as calculated from the following:    Height as of this encounter: 1.43 m (4' 8.3\").    Weight as of this encounter: 70.8 kg " "(156 lb).             No follow-ups on file.    Cornelia Anne is a 69 year old, presenting for the following health issues:  Diabetes (Follow up)    HPI       Diabetes Follow-up  Novolo bfast 20 lunch, 20 dinner  Lantus: 40 bedtime  Victoza 1.8 daily out  Jardiance 10 mg  How often are you checking your blood sugar? rarely  What concerns do you have today about your diabetes? Other: out of victoza for 2 weeks   Do you have any of these symptoms? (Select all that apply)  No numbness or tingling in feet.  No redness, sores or blisters on feet.  No complaints of excessive thirst.  No reports of blurry vision.  No significant changes to weight.      BP Readings from Last 2 Encounters:   24 130/52   24 126/70     Hemoglobin A1C (%)   Date Value   2024 8.6 (H)   2024 8.0 (H)   2021 6.8 (H)   2021 7.3 (H)     LDL Cholesterol Calculated (mg/dL)   Date Value   2024 52   2023 63   2012 83   2011 98     LDL Cholesterol Direct (mg/dL)   Date Value   10/02/2020 52.0   2019 49.0             Chronic Kidney Disease Follow-up  Losartan and jardiance    Do you take any over the counter pain medicine?: No just what is prescribed  Not aware of any symptoms and hesitant to start seeing nephrology      Polyarthritis: quieter now than was decades a go but still wrist pain, still wearing hand sleeve  Would be interested in seeing rheumatology again    How many days per week do you miss taking your medication? 0  Never misses insulin, out of victoza            Objective    /52   Pulse 51   Temp 98.5  F (36.9  C) (Tympanic)   Resp 20   Ht 1.43 m (4' 8.3\")   Wt 70.8 kg (156 lb)   SpO2 99%   BMI 34.60 kg/m    Body mass index is 34.6 kg/m .  Physical Exam   GENERAL: alert and no distress  MS: arthritic changes to left hand, fingers deviated, PIP and DIP joints thickened, no erythema no induration  PSYCH: mentation appears normal, affect " normal/bright  Diabetic foot exam:   Diabetic Foot Screen:  Any complaints of increased pain or numbness ? No  Is there a foot ulcer now or a history of foot ulcer? No  Does the foot have an abnormal shape? No  Are the nails thick, too long or ingrown?  YES thickened  Are there any redness or open areas? No         Sensation Testing done at all points on the diagram with monofilament     Right Foot: Sensation Normal at all points  Left Foot: Sensation Normal at all points     Risk Category: 0- No loss of protective sensation  Performed by Shannan Benson MD            Signed Electronically by: Shannan Benson MD

## 2024-09-13 NOTE — RESULT ENCOUNTER NOTE
Call patient:  Blood is stable and kidneys are remaining stable.  No changes to medications based on these levels.

## 2024-09-16 DIAGNOSIS — E11.42 TYPE 2 DIABETES MELLITUS WITH DIABETIC POLYNEUROPATHY, WITH LONG-TERM CURRENT USE OF INSULIN (H): ICD-10-CM

## 2024-09-16 DIAGNOSIS — Z79.4 TYPE 2 DIABETES MELLITUS WITH DIABETIC POLYNEUROPATHY, WITH LONG-TERM CURRENT USE OF INSULIN (H): ICD-10-CM

## 2024-09-16 RX ORDER — LIRAGLUTIDE 6 MG/ML
1.8 INJECTION SUBCUTANEOUS DAILY
Qty: 27 ML | Refills: 3 | Status: SHIPPED | OUTPATIENT
Start: 2024-09-16

## 2024-09-22 DIAGNOSIS — E55.9 VITAMIN D DEFICIENCY: ICD-10-CM

## 2024-09-23 RX ORDER — CHOLECALCIFEROL (VITAMIN D3) 25 MCG
1 TABLET ORAL DAILY
Qty: 90 TABLET | Refills: 3 | Status: SHIPPED | OUTPATIENT
Start: 2024-09-23

## 2024-09-24 DIAGNOSIS — M05.742 RHEUMATOID ARTHRITIS INVOLVING BOTH HANDS WITH POSITIVE RHEUMATOID FACTOR (H): ICD-10-CM

## 2024-09-24 DIAGNOSIS — M05.741 RHEUMATOID ARTHRITIS INVOLVING BOTH HANDS WITH POSITIVE RHEUMATOID FACTOR (H): ICD-10-CM

## 2024-09-24 RX ORDER — ACETAMINOPHEN 325 MG/1
TABLET ORAL
Qty: 100 TABLET | Refills: 3 | Status: SHIPPED | OUTPATIENT
Start: 2024-09-24

## 2024-09-24 NOTE — TELEPHONE ENCOUNTER
Medication diagnosis does not match indicated dx. Outside RN standing orders, routing to PCP to review and fill. Please updated associated dx if appropriate to one of the following:  Acetaminophen is associated with one or more of the following diagnoses:  Pain  Fever

## 2024-09-30 ENCOUNTER — PATIENT OUTREACH (OUTPATIENT)
Dept: FAMILY MEDICINE | Facility: CLINIC | Age: 69
End: 2024-09-30
Payer: COMMERCIAL

## 2024-09-30 NOTE — TELEPHONE ENCOUNTER
Patient Quality Outreach    Patient is due for the following:   Diabetes -  A1C    Next Steps:   Patient has upcoming appointment, these items will be addressed at that time.    Type of outreach:    Phone, Spoke with patient    Next Steps:  Reach out within 90 days via Phone.    Max number of attempts reached:  no . Will try again in 90 days if patient still on fail list.    Questions for provider review:    None           Cisco Gil CMA

## 2024-10-09 ENCOUNTER — TELEPHONE (OUTPATIENT)
Dept: FAMILY MEDICINE | Facility: CLINIC | Age: 69
End: 2024-10-09
Payer: COMMERCIAL

## 2024-10-09 NOTE — TELEPHONE ENCOUNTER
Forms faxed this day.          Forms/Letter Request    Type of form/letter: OTHER:  [facility] ICD-10 Diagnosis Verification Form       Do we have the form/letter: Yes:     Who is the form from? [facility] (if other please explain)    Where did/will the form come from? form was mailed in    When is form/letter needed by:     How would you like the form/letter returned: Fax : [phone]    Patient Notified form requests are processed in 5-7 business days:Yes    Okay to leave a detailed message?: No at Other phone number:  [phone]

## 2024-10-21 DIAGNOSIS — E55.9 VITAMIN D DEFICIENCY: ICD-10-CM

## 2024-10-21 DIAGNOSIS — I10 ESSENTIAL HYPERTENSION: ICD-10-CM

## 2024-10-21 RX ORDER — AMLODIPINE BESYLATE 10 MG/1
10 TABLET ORAL EVERY MORNING
Qty: 90 TABLET | Refills: 0 | Status: SHIPPED | OUTPATIENT
Start: 2024-10-21

## 2024-10-21 RX ORDER — CHOLECALCIFEROL (VITAMIN D3) 25 MCG
1 TABLET ORAL DAILY
Qty: 90 TABLET | Refills: 3 | Status: SHIPPED | OUTPATIENT
Start: 2024-10-21

## 2024-10-21 NOTE — TELEPHONE ENCOUNTER
Most recent GFR outside of normal range. Last result was 26, resulted on 08/13/24. Outside RN standing orders, routing to PCP to review and fill if appropriate. Navid FELICIANO

## 2024-10-26 DIAGNOSIS — I10 ESSENTIAL HYPERTENSION: ICD-10-CM

## 2024-10-28 RX ORDER — LOSARTAN POTASSIUM 100 MG/1
100 TABLET ORAL DAILY
Qty: 90 TABLET | Refills: 3 | Status: SHIPPED | OUTPATIENT
Start: 2024-10-28

## 2024-10-28 NOTE — TELEPHONE ENCOUNTER
GFR last resulted on August 13, 2024. GFR resulted at 26. Outside RN standing orders, routing to PCP to review and fill if appropriate Navid FELICIANO

## 2024-11-21 ENCOUNTER — PATIENT OUTREACH (OUTPATIENT)
Dept: FAMILY MEDICINE | Facility: CLINIC | Age: 69
End: 2024-11-21
Payer: COMMERCIAL

## 2024-11-21 NOTE — TELEPHONE ENCOUNTER
Patient Quality Outreach    Patient is due for the following:   Diabetes -  Diabetic Follow-Up Visit    Action(s) Taken:   Patient has upcoming appointment, these items will be addressed at that time.    Type of outreach:    Chart review performed, no outreach needed.    Questions for provider review:    None           Cisco Gil CMA  Chart routed to Care Team.

## 2024-11-25 NOTE — PROGRESS NOTES
Assessment & Plan     (E11.42,  Z79.4) Type 2 diabetes mellitus with diabetic polyneuropathy, with long-term current use of insulin (H)  (primary encounter diagnosis)  Comment: congratulated on A1c of <8.0  no changes to insulin dosing, victoza or oral medications, recheck in 3 months  Plan: Hemoglobin A1c, Insulin Aspart FlexPen 100         UNIT/ML SOPN, insulin detemir (LEVEMIR         FLEXPEN/FLEXTOUCH) 100 UNIT/ML pen            (N18.4) CKD (chronic kidney disease) stage 4, GFR 15-29 ml/min (H)  Comment: discussed importance of renvela and options for pills (dislikes size, dislikes powder or liquid options)  Plan: Basic metabolic panel        Reviewed pill cutter, encouraged patient to take 2 tabs BID.  -discussed genoa pill packet option as now having so many pills takes for daughter to arrange in pill box and not fitting    (M05.742) Rheumatoid arthritis with rheumatoid factor of left hand without organ or systems involvement (H)  Comment: has appt in 2 months, not quite sure rheumatoid vs. Other inflammatory or traumatic arthritic changes  Plan: review current med if new options to consider for hand arthropathy    (F33.1) Major depressive disorder, recurrent episode, moderate (H)  Comment: well controlled  PHQ9-4  Plan: appears almost resolved    (E78.00) Pure hypercholesterolemia  Comment: refilled  Plan: rosuvastatin (CRESTOR) 10 MG tablet            (I10) Essential hypertension  Comment: refilled, currently controlled, seen by nephrology and no changes made to meds  Plan: amLODIPine (NORVASC) 10 MG tablet,         hydrochlorothiazide (HYDRODIURIL) 25 MG tablet            (Z01.89,  Z79.899) Encounter for bone density measurement for therapeutic drug monitoring  Comment: scheduled  Plan: DX Hip/Pelvis/Spine          The longitudinal plan of care for the diagnosis(es)/condition(s) as documented were addressed during this visit. Due to the added complexity in care, I will continue to support Omid in the  "subsequent management and with ongoing continuity of care.  BMI  Estimated body mass index is 34.89 kg/m  as calculated from the following:    Height as of this encounter: 1.415 m (4' 7.71\").    Weight as of this encounter: 69.9 kg (154 lb).             No follow-ups on file.    Cornelia Anne is a 69 year old, presenting for the following health issues:  Diabetes and Refill Request (Any, unsure which medication per patient )    HPI             Diabetes Follow-up  Novolo bfast 20 lunch, 18 dinner  Lantus: 40 bedtime  Victoza 1.8 daily out  Jardiance 10 mg  How often are you checking your blood sugar? Daily sometimes higher during day so increased meal insulin at lunch    BP Readings from Last 2 Encounters:   24 138/78   24 130/52     Hemoglobin A1C (%)   Date Value   2024 7.9 (H)   2024 8.6 (H)   2021 6.8 (H)   2021 7.3 (H)     LDL Cholesterol Calculated (mg/dL)   Date Value   2024 52   2023 63   2012 83   2011 98     LDL Cholesterol Direct (mg/dL)   Date Value   10/02/2020 52.0   2019 49.0             Chronic Kidney Disease Follow-up      Given script for sodium bicarb tablets, didn't understand and just started taking and just started taking, only one pill because of size  Do you take any over the counter pain medicine?: No  How many days per week do you miss taking your medication? 0            Objective    /78   Pulse 57   Temp 97.9  F (36.6  C) (Oral)   Resp 18   Ht 1.415 m (4' 7.71\")   Wt 69.9 kg (154 lb)   SpO2 97%   BMI 34.89 kg/m    Body mass index is 34.89 kg/m .  Physical Exam   GENERAL: alert and no distress  RESP: lungs clear to auscultation - no rales, rhonchi or wheezes  CV: regular rate and rhythm, normal S1 S2, no S3 or S4, no murmur, click or rub, no peripheral edema  PSYCH: mentation appears normal, affect normal/bright    Results for orders placed or performed in visit on 24 (from the past 24 hours) "   Hemoglobin A1c   Result Value Ref Range    Estimated Average Glucose 180 (H) <117 mg/dL    Hemoglobin A1C 7.9 (H) 0.0 - 5.6 %           Signed Electronically by: Shannan Benson MD    Answers submitted by the patient for this visit:  Patient Health Questionnaire (Submitted on 11/26/2024)  If you checked off any problems, how difficult have these problems made it for you to do your work, take care of things at home, or get along with other people?: Somewhat difficult  PHQ9 TOTAL SCORE: 4

## 2024-11-26 ENCOUNTER — OFFICE VISIT (OUTPATIENT)
Dept: FAMILY MEDICINE | Facility: CLINIC | Age: 69
End: 2024-11-26
Payer: COMMERCIAL

## 2024-11-26 VITALS
TEMPERATURE: 97.9 F | OXYGEN SATURATION: 97 % | HEIGHT: 56 IN | DIASTOLIC BLOOD PRESSURE: 78 MMHG | SYSTOLIC BLOOD PRESSURE: 138 MMHG | HEART RATE: 57 BPM | BODY MASS INDEX: 34.64 KG/M2 | WEIGHT: 154 LBS | RESPIRATION RATE: 18 BRPM

## 2024-11-26 DIAGNOSIS — M05.742 RHEUMATOID ARTHRITIS WITH RHEUMATOID FACTOR OF LEFT HAND WITHOUT ORGAN OR SYSTEMS INVOLVEMENT (H): ICD-10-CM

## 2024-11-26 DIAGNOSIS — Z79.899 ENCOUNTER FOR BONE DENSITY MEASUREMENT FOR THERAPEUTIC DRUG MONITORING: ICD-10-CM

## 2024-11-26 DIAGNOSIS — E11.42 TYPE 2 DIABETES MELLITUS WITH DIABETIC POLYNEUROPATHY, WITH LONG-TERM CURRENT USE OF INSULIN (H): Primary | ICD-10-CM

## 2024-11-26 DIAGNOSIS — Z01.89 ENCOUNTER FOR BONE DENSITY MEASUREMENT FOR THERAPEUTIC DRUG MONITORING: ICD-10-CM

## 2024-11-26 DIAGNOSIS — N18.4 CKD (CHRONIC KIDNEY DISEASE) STAGE 4, GFR 15-29 ML/MIN (H): ICD-10-CM

## 2024-11-26 DIAGNOSIS — E78.00 PURE HYPERCHOLESTEROLEMIA: ICD-10-CM

## 2024-11-26 DIAGNOSIS — Z79.4 TYPE 2 DIABETES MELLITUS WITH DIABETIC POLYNEUROPATHY, WITH LONG-TERM CURRENT USE OF INSULIN (H): Primary | ICD-10-CM

## 2024-11-26 DIAGNOSIS — F33.1 MAJOR DEPRESSIVE DISORDER, RECURRENT EPISODE, MODERATE (H): ICD-10-CM

## 2024-11-26 DIAGNOSIS — I10 ESSENTIAL HYPERTENSION: ICD-10-CM

## 2024-11-26 LAB
EST. AVERAGE GLUCOSE BLD GHB EST-MCNC: 180 MG/DL
HBA1C MFR BLD: 7.9 % (ref 0–5.6)

## 2024-11-26 RX ORDER — AMLODIPINE BESYLATE 10 MG/1
10 TABLET ORAL EVERY MORNING
Qty: 90 TABLET | Refills: 3 | Status: SHIPPED | OUTPATIENT
Start: 2024-11-26

## 2024-11-26 RX ORDER — INSULIN DETEMIR 100 [IU]/ML
40 INJECTION, SOLUTION SUBCUTANEOUS AT BEDTIME
Qty: 45 ML | Refills: 3 | Status: SHIPPED | OUTPATIENT
Start: 2024-11-26

## 2024-11-26 RX ORDER — INSULIN ASPART 100 [IU]/ML
18-20 INJECTION, SOLUTION INTRAVENOUS; SUBCUTANEOUS
Qty: 45 ML | Refills: 3 | Status: SHIPPED | OUTPATIENT
Start: 2024-11-26

## 2024-11-26 RX ORDER — HYDROCHLOROTHIAZIDE 25 MG/1
25 TABLET ORAL DAILY
Qty: 90 TABLET | Refills: 3 | Status: SHIPPED | OUTPATIENT
Start: 2024-11-26

## 2024-11-26 RX ORDER — ROSUVASTATIN CALCIUM 10 MG/1
10 TABLET, COATED ORAL DAILY
Qty: 90 TABLET | Refills: 3 | Status: SHIPPED | OUTPATIENT
Start: 2024-11-26

## 2024-11-26 RX ORDER — SODIUM BICARBONATE 650 MG/1
2 TABLET ORAL
COMMUNITY
Start: 2024-09-28

## 2024-11-26 ASSESSMENT — PATIENT HEALTH QUESTIONNAIRE - PHQ9
SUM OF ALL RESPONSES TO PHQ QUESTIONS 1-9: 4
SUM OF ALL RESPONSES TO PHQ QUESTIONS 1-9: 4
10. IF YOU CHECKED OFF ANY PROBLEMS, HOW DIFFICULT HAVE THESE PROBLEMS MADE IT FOR YOU TO DO YOUR WORK, TAKE CARE OF THINGS AT HOME, OR GET ALONG WITH OTHER PEOPLE: SOMEWHAT DIFFICULT

## 2024-11-27 LAB
ANION GAP SERPL CALCULATED.3IONS-SCNC: 9 MMOL/L (ref 7–15)
BUN SERPL-MCNC: 55.4 MG/DL (ref 8–23)
CALCIUM SERPL-MCNC: 9.1 MG/DL (ref 8.8–10.4)
CHLORIDE SERPL-SCNC: 108 MMOL/L (ref 98–107)
CREAT SERPL-MCNC: 1.88 MG/DL (ref 0.51–0.95)
EGFRCR SERPLBLD CKD-EPI 2021: 28 ML/MIN/1.73M2
GLUCOSE SERPL-MCNC: 131 MG/DL (ref 70–99)
HCO3 SERPL-SCNC: 18 MMOL/L (ref 22–29)
POTASSIUM SERPL-SCNC: 5.3 MMOL/L (ref 3.4–5.3)
SODIUM SERPL-SCNC: 135 MMOL/L (ref 135–145)

## 2024-11-27 NOTE — RESULT ENCOUNTER NOTE
Call patient:  Kidneys remain stable.  Continue to try to take the new kidney medicine renvella twice daily to help the kidneys.

## 2024-12-02 ENCOUNTER — APPOINTMENT (OUTPATIENT)
Dept: INTERPRETER SERVICES | Facility: CLINIC | Age: 69
End: 2024-12-02
Payer: COMMERCIAL

## 2024-12-12 ENCOUNTER — OFFICE VISIT (OUTPATIENT)
Dept: FAMILY MEDICINE | Facility: CLINIC | Age: 69
End: 2024-12-12
Payer: COMMERCIAL

## 2024-12-12 VITALS
BODY MASS INDEX: 34.42 KG/M2 | WEIGHT: 153 LBS | OXYGEN SATURATION: 99 % | TEMPERATURE: 98 F | RESPIRATION RATE: 18 BRPM | HEART RATE: 54 BPM | DIASTOLIC BLOOD PRESSURE: 58 MMHG | HEIGHT: 56 IN | SYSTOLIC BLOOD PRESSURE: 136 MMHG

## 2024-12-12 DIAGNOSIS — N18.4 CKD (CHRONIC KIDNEY DISEASE) STAGE 4, GFR 15-29 ML/MIN (H): Primary | ICD-10-CM

## 2024-12-12 DIAGNOSIS — E11.42 TYPE 2 DIABETES MELLITUS WITH DIABETIC POLYNEUROPATHY, WITH LONG-TERM CURRENT USE OF INSULIN (H): ICD-10-CM

## 2024-12-12 DIAGNOSIS — R01.1 HEART MURMUR, SYSTOLIC: ICD-10-CM

## 2024-12-12 DIAGNOSIS — Z79.4 TYPE 2 DIABETES MELLITUS WITH DIABETIC POLYNEUROPATHY, WITH LONG-TERM CURRENT USE OF INSULIN (H): ICD-10-CM

## 2024-12-12 LAB
CREAT UR-MCNC: 77.1 MG/DL
HGB BLD-MCNC: 11.5 G/DL (ref 11.7–15.7)
MICROALBUMIN UR-MCNC: 384 MG/L
MICROALBUMIN/CREAT UR: 498.05 MG/G CR (ref 0–25)

## 2024-12-12 NOTE — PROGRESS NOTES
"  Assessment & Plan     (N18.4) CKD (chronic kidney disease) stage 4, GFR 15-29 ml/min (H)  (primary encounter diagnosis)  Comment: will message nephrology if there is an alternative or if they have heard of this non-specific pain related to sodium bicarbonate  Plan: Albumin Random Urine Quantitative with Creat         Ratio, Hemoglobin        Patient to try lower dose and see if that is tolerated, 1 pill BID    (E11.42,  Z79.4) Type 2 diabetes mellitus with diabetic polyneuropathy, with long-term current use of insulin (H)  Comment: changing formulary written for lantus  Plan: insulin glargine (LANTUS PEN) 100 UNIT/ML pen            (R01.1) Heart murmur, systolic  Comment: reviewed previous echo  Plan: appears asymptomatic, possibly functional,    -discussed both with bradycardia and murmur to let us know of any near syncope or fatigue    The longitudinal plan of care for the diagnosis(es)/condition(s) as documented were addressed during this visit. Due to the added complexity in care, I will continue to support Omid in the subsequent management and with ongoing continuity of care.          BMI  Estimated body mass index is 34.3 kg/m  as calculated from the following:    Height as of this encounter: 1.422 m (4' 8\").    Weight as of this encounter: 69.4 kg (153 lb).             No follow-ups on file.    Cornelia Anne is a 69 year old, presenting for the following health issues:  Formulary Issue (Levemir flexpen ) and Medication Problem (Sodium bicarbonate - causing back pain )        12/12/2024     7:58 AM   Additional Questions   Roomed by Nani tompkins         12/12/2024    Information    services provided? Yes   Language Hmong   Type of interpretation provided Face-to-face    name stacey tompkins    [employer]    phone number [phone]        HPI           DM:  Has [employer] switching from levemir to lantus and needing new prescription  No concerns about " "DM  Checking sugars about 1 time/week    2. CKD: now on sodium bicarbonate and feels like having back pain with this  -wasn't sure it was from the medicine, but when she stopped taking this pill the pain went away completely  -describes entire back feeling burning, no burning if doesn't take the pills      No GI concerns, no  concerns, no limb pains      Objective    /58   Pulse 54   Temp 98  F (36.7  C) (Tympanic)   Resp 18   Ht 1.422 m (4' 8\")   Wt 69.4 kg (153 lb)   SpO2 99%   BMI 34.30 kg/m    Body mass index is 34.3 kg/m .  Physical Exam   GENERAL: alert and no distress  RESP: lungs clear to auscultation - no rales, rhonchi or wheezes  CV: bradycardia, normal S1 S2, no S3 or S4, grade 2/6 early systolic murmur heard best over the upper right sternal border, peripheral pulses strong, and no peripheral edema  MS: no gross musculoskeletal defects noted, no edema            Signed Electronically by: Shannan Benson MD    "

## 2024-12-13 NOTE — RESULT ENCOUNTER NOTE
Call patient:  Your urine testing shows stable kidney function, it is still consistent with previous chronic kidney disease levels no sign of worsening.   Your hemoglobin is normal.  This is also good.

## 2024-12-20 ENCOUNTER — HOSPITAL ENCOUNTER (OUTPATIENT)
Dept: BONE DENSITY | Facility: HOSPITAL | Age: 69
Discharge: HOME | End: 2024-12-20
Attending: FAMILY MEDICINE | Admitting: FAMILY MEDICINE
Payer: COMMERCIAL

## 2024-12-20 DIAGNOSIS — Z01.89 ENCOUNTER FOR BONE DENSITY MEASUREMENT FOR THERAPEUTIC DRUG MONITORING: ICD-10-CM

## 2024-12-20 DIAGNOSIS — Z79.899 ENCOUNTER FOR BONE DENSITY MEASUREMENT FOR THERAPEUTIC DRUG MONITORING: ICD-10-CM

## 2024-12-20 PROCEDURE — 77080 DXA BONE DENSITY AXIAL: CPT

## 2024-12-20 PROCEDURE — 77091 TBS TECHL CALCULATION ONLY: CPT

## 2024-12-24 DIAGNOSIS — E78.00 PURE HYPERCHOLESTEROLEMIA: ICD-10-CM

## 2024-12-24 RX ORDER — ROSUVASTATIN CALCIUM 10 MG/1
10 TABLET, COATED ORAL DAILY
Qty: 90 TABLET | Refills: 3 | Status: SHIPPED | OUTPATIENT
Start: 2024-12-24

## 2025-01-06 ENCOUNTER — TELEPHONE (OUTPATIENT)
Dept: FAMILY MEDICINE | Facility: CLINIC | Age: 70
End: 2025-01-06
Payer: COMMERCIAL

## 2025-01-06 NOTE — TELEPHONE ENCOUNTER
Prior Authorization Retail Medication Request    Medication/Dose: Liraglutide 18mg/3ml  Diagnosis and ICD code (if different than what is on RX):    New/renewal/insurance change PA/secondary ins. PA:  Previously Tried and Failed:    Rationale:      Insurance   Primary: [phone]  Insurance ID:  [id number]    CM:[id number]        Clinic Information  Preferred routing pool for dept communication: PVPAPOOL

## 2025-01-07 NOTE — TELEPHONE ENCOUNTER
PA Initiation    Medication: LIRAGLUTIDE 18 MG/3ML SC SOPN  Insurance Company: [employer] - Phone [phone] Fax [phone]  Pharmacy Filling the Rx: [id number] [place], [place] - [facility] [facility] [address]  Filling Pharmacy Phone: [phone]  Filling Pharmacy Fax: [phone]  Start Date: 1/6/2025

## 2025-01-08 NOTE — TELEPHONE ENCOUNTER
Prior Authorization Approval    Medication: LIRAGLUTIDE 18 MG/3ML SC SOPN  Authorization Effective Date: 1/7/2025  Authorization Expiration Date: 1/7/2026  Approved Dose/Quantity:   Reference #: [id number]   Insurance Company: [employer] - Phone [phone] Fax [phone]  Which Pharmacy is filling the prescription: [id number] [place] [address] AT [facility] [address]  Pharmacy Notified: YES  Patient Notified: INSTRUCTED PHARMACY TO NOTIFY PATIENT WHEN READY

## 2025-01-28 ENCOUNTER — APPOINTMENT (OUTPATIENT)
Dept: INTERPRETER SERVICES | Facility: CLINIC | Age: 70
End: 2025-01-28
Payer: COMMERCIAL

## 2025-01-30 ENCOUNTER — OFFICE VISIT (OUTPATIENT)
Dept: FAMILY MEDICINE | Facility: CLINIC | Age: 70
End: 2025-01-30
Payer: COMMERCIAL

## 2025-01-30 VITALS
DIASTOLIC BLOOD PRESSURE: 57 MMHG | RESPIRATION RATE: 20 BRPM | HEART RATE: 58 BPM | OXYGEN SATURATION: 97 % | TEMPERATURE: 97.2 F | HEIGHT: 56 IN | BODY MASS INDEX: 34.66 KG/M2 | WEIGHT: 154.08 LBS | SYSTOLIC BLOOD PRESSURE: 124 MMHG

## 2025-01-30 DIAGNOSIS — Z79.4 TYPE 2 DIABETES MELLITUS WITH BOTH EYES AFFECTED BY MILD NONPROLIFERATIVE RETINOPATHY WITHOUT MACULAR EDEMA, WITH LONG-TERM CURRENT USE OF INSULIN (H): Primary | ICD-10-CM

## 2025-01-30 DIAGNOSIS — M05.741 RHEUMATOID ARTHRITIS INVOLVING BOTH HANDS WITH POSITIVE RHEUMATOID FACTOR (H): ICD-10-CM

## 2025-01-30 DIAGNOSIS — M85.852 OSTEOPENIA OF NECK OF LEFT FEMUR: ICD-10-CM

## 2025-01-30 DIAGNOSIS — M05.742 RHEUMATOID ARTHRITIS INVOLVING BOTH HANDS WITH POSITIVE RHEUMATOID FACTOR (H): ICD-10-CM

## 2025-01-30 DIAGNOSIS — F33.1 MAJOR DEPRESSIVE DISORDER, RECURRENT EPISODE, MODERATE (H): ICD-10-CM

## 2025-01-30 DIAGNOSIS — N18.4 CKD (CHRONIC KIDNEY DISEASE) STAGE 4, GFR 15-29 ML/MIN (H): ICD-10-CM

## 2025-01-30 DIAGNOSIS — M13.0 POLYARTHRITIS OF HAND: ICD-10-CM

## 2025-01-30 DIAGNOSIS — E11.3293 TYPE 2 DIABETES MELLITUS WITH BOTH EYES AFFECTED BY MILD NONPROLIFERATIVE RETINOPATHY WITHOUT MACULAR EDEMA, WITH LONG-TERM CURRENT USE OF INSULIN (H): Primary | ICD-10-CM

## 2025-01-30 RX ORDER — ACETAMINOPHEN 325 MG/1
325-650 TABLET ORAL EVERY 6 HOURS PRN
Qty: 100 TABLET | Refills: 3 | Status: SHIPPED | OUTPATIENT
Start: 2025-01-30

## 2025-01-30 RX ORDER — LANCETS
EACH MISCELLANEOUS
Qty: 102 EACH | Refills: 5 | Status: SHIPPED | OUTPATIENT
Start: 2025-01-30

## 2025-01-30 ASSESSMENT — PATIENT HEALTH QUESTIONNAIRE - PHQ9
10. IF YOU CHECKED OFF ANY PROBLEMS, HOW DIFFICULT HAVE THESE PROBLEMS MADE IT FOR YOU TO DO YOUR WORK, TAKE CARE OF THINGS AT HOME, OR GET ALONG WITH OTHER PEOPLE: SOMEWHAT DIFFICULT
SUM OF ALL RESPONSES TO PHQ QUESTIONS 1-9: 3
SUM OF ALL RESPONSES TO PHQ QUESTIONS 1-9: 3

## 2025-01-30 NOTE — PROGRESS NOTES
"  Assessment & Plan     (E11.3293,  Z79.4) Type 2 diabetes mellitus with both eyes affected by mild nonproliferative retinopathy without macular edema, with long-term current use of insulin (H)  (primary encounter diagnosis)  Comment: reviewed that patient has corrected her lantus up to 46 units at bedtime, and now slight low so instructed to reduce to 44 units  Plan: blood glucose monitoring (ACCU-CHEK FASTCLIX)         lancets, blood glucose (ACCU-CHEK CHIKA) test         strip        Patient declines CGM but not aware of other abnormal sugars so will continue with same novolog with meals and slight increase in lantus to 44 units    (M85.852) Osteopenia of neck of left femur  Comment: discussed category of osteoporosis at 2.5 and will add on Calcium to current Vitamin D  Plan: calcium carbonate (OS-ROOSEVELT) 1500 (600 Ca) MG         tablet            (N18.4) CKD (chronic kidney disease) stage 4, GFR 15-29 ml/min (H)  Comment: patient now with nephrology and on lower dose sodium bicarbonate  Plan: no changes    (Z68.41) Body mass index (BMI) 40.0-44.9, adult (H)  Comment: chronic  Plan:   Work on glucose control    (M13.0) Polyarthritis of hand  Comment: awaiting rheumatology visit  Plan: patient on tylenol and asks for refill, no obvious signs of current synovitis or inflammation    (F33.1) Major depressive disorder, recurrent episode, moderate (H)  Comment: patient at baseline, flat affect  Plan: not on meds, perhaps more dysthymia vs. Related to illness and reactive depression.  Continue to monitor.    The longitudinal plan of care for the diagnosis(es)/condition(s) as documented were addressed during this visit. Due to the added complexity in care, I will continue to support Anne in the subsequent management and with ongoing continuity of care.      BMI  Estimated body mass index is 35.15 kg/m  as calculated from the following:    Height as of this encounter: 1.41 m (4' 7.51\").    Weight as of this encounter: 69.9 " "kg (154 lb 1.3 oz).             No follow-ups on file.    Cornelia Anne is a 69 year old, presenting for the following health issues:  Follow Up (Diabetes follow up. )    HPI       Diabetes Follow-up    How often are you checking your blood sugar? One time daily  What time of day are you checking your blood sugars (select all that apply)?  Before meals  Have you had any blood sugars above 200?  Yes last couple days had sugars in the 200s in the AM--increased lantus at night from 40-46 units, has done that for 2-3 days  Have you had any blood sugars below 70?  Did have a 69  What symptoms do you notice when your blood sugar is low?  Shaky  What concerns do you have today about your diabetes? Blood sugar is often over 200   Do you have any of these symptoms? (Select all that apply)      BP Readings from Last 2 Encounters:   01/30/25 124/57   12/12/24 136/58     Hemoglobin A1C (%)   Date Value   11/26/2024 7.9 (H)   08/13/2024 8.6 (H)   06/03/2021 6.8 (H)   03/02/2021 7.3 (H)     LDL Cholesterol Calculated (mg/dL)   Date Value   03/19/2024 52   03/02/2023 63   07/24/2012 83   11/08/2011 98     LDL Cholesterol Direct (mg/dL)   Date Value   10/02/2020 52.0   03/08/2019 49.0             Chronic Kidney Disease Follow-up    Do you take any over the counter pain medicine?: No    Osteopenia:  Recent DEXA with left hip at Tscore -2.4 but all other readings at Tscore -0.9    Does have rheum appt in February and admits to the left hip being more sore              Objective    /57   Pulse 58   Temp 97.2  F (36.2  C) (Tympanic)   Resp 20   Ht 1.41 m (4' 7.51\")   Wt 69.9 kg (154 lb 1.3 oz)   SpO2 97%   BMI 35.15 kg/m    Body mass index is 35.15 kg/m .  Physical Exam   GENERAL: alert and no distress  RESP: lungs clear to auscultation - no rales, rhonchi or wheezes  CV: regular rate and rhythm, normal S1 S2, no S3 or S4, no murmur, click or rub, no peripheral edema  JOINTS: some finger joints show arthritic " angulation and thickening,             Signed Electronically by: Shannan Benson MD    Answers submitted by the patient for this visit:  Patient Health Questionnaire (Submitted on 1/30/2025)  If you checked off any problems, how difficult have these problems made it for you to do your work, take care of things at home, or get along with other people?: Somewhat difficult  PHQ9 TOTAL SCORE: 3

## 2025-02-08 NOTE — TELEPHONE ENCOUNTER
Called and pt will agree to keeping appt as scheduled.     SANDY Hernandez (Ayana) [facility] [address] [place] [address]  [phone]     Goals of care discussed with . He does not feel that he can care for his wife anymore. This makes him feel very guilty. He was reassured that he is doing what is best for his wife and that he is not the cause of this but her underlying medical illness is. We discussed her poor prognosis. What hospice would look like was also discussed and that this would be a decision he would need to make as his wife does not seem able to take into account all of the different factors with her current mental status. He did ask that his daughter be included in the discussion. Ayde was updated with a similar conversation on the phone. I then went into the room with pt and her  with Ayde on speaker phone and talked about these things. At this time, plan is for pt to go to a care facility but they are still discussing what her goals of care will be.   Kathy Lyons MD  [facility] Hospitalist Team  [phone]

## 2025-02-19 ENCOUNTER — APPOINTMENT (OUTPATIENT)
Dept: INTERPRETER SERVICES | Facility: CLINIC | Age: 70
End: 2025-02-19
Payer: COMMERCIAL

## 2025-02-20 ENCOUNTER — TELEPHONE (OUTPATIENT)
Dept: RHEUMATOLOGY | Facility: CLINIC | Age: 70
End: 2025-02-20

## 2025-02-20 ENCOUNTER — OFFICE VISIT (OUTPATIENT)
Dept: RHEUMATOLOGY | Facility: CLINIC | Age: 70
End: 2025-02-20
Attending: FAMILY MEDICINE
Payer: COMMERCIAL

## 2025-02-20 VITALS
OXYGEN SATURATION: 98 % | BODY MASS INDEX: 36.45 KG/M2 | DIASTOLIC BLOOD PRESSURE: 48 MMHG | HEART RATE: 56 BPM | SYSTOLIC BLOOD PRESSURE: 148 MMHG | WEIGHT: 157.5 LBS | HEIGHT: 55 IN

## 2025-02-20 DIAGNOSIS — M06.4 INFLAMMATORY POLYARTHROPATHY (H): ICD-10-CM

## 2025-02-20 NOTE — PROGRESS NOTES
Rheumatology Clinic Visit  [facility] [place]  SOSA Rosario MRN# [id number]   YOB: 1955 Age: 69 year old   Date of Visit: 02/20/2025  Primary care provider: Shannan Benson          Assessment and Plan:     1.  Inflammatory polyarthropathy    Patient presents today for an initial visit.  She is accompanied today by family member.  Professional  is present for the visit via iPad.  Patient was diagnosed with an inflammatory arthropathy in 2008.  At that time she had a negative CCP antibody but positive rheumatoid factor.  She does have chronic hepatitis B which can trigger rheumatoid factor production.  She was put on Plaquenil due to her comorbidities.  Unfortunately the Plaquenil was not effective.  She was trialed on sulfasalazine but had an allergic reaction to it.  In the past she has refused treatment and biopsy for her hepatitis.  At that time there were no other significant options available for her treatment and she felt like things were stable.  Due to her uncontrolled diabetes prednisone was not recommended.  Currently her hands are what affect her the most.  Physical examination today does show active synovitis over her MCPs bilaterally.  Decreased fist formation.  Chronic deformity of the left second digit due to an injury many years ago.    Patient states that if the treatment recommendation is more medication she is not interested in this.  At this juncture given her comorbidities we are quite limited on what treatment options would be available.  If we were to consider medication options would have her meet with our [facility] pharmacist prior to initiating any treatment.  We could consider trialing hydroxychloroquine again, but again would get the recommendation of the [facility] pharmacist as well.  We will recheck her rheumatoid factor, CCP antibody, CRP and sed rate to see where those are at.  Also get x-rays of her hands and of her wrist today.  As the patient  is not currently interested in treatment follow-up with rheumatology will be as needed.  Patient is aware of the risks associated with not treating the inflammatory arthritis.    Plan:     Schedule follow-up with Zari Strickland PA-C as needed  Imaging: xray hands and wrists today  Labs: CCP antibody, Rheumatoid factor, CRP and Sed rate with your next lab draw  Medication recommendations:   Would recommend having you meet with our [facility] pharmacist prior to starting medications. If you did start a medication, I would want to meet with you 3 months after starting.     SOSA Rosario  Rheumatology         History of Present Illness:   Omid Adorno presents for evaluation of inflammatory arthritis. Pmhx includes Hepatitis B (untreated), DM, Chronic kidney disease, stage 4 systolic heart murmur, morbid obesity, glaucoma, dyspepsia, hypercholesterolemia, hypertension, and vitamin D deficiency    Rheumatological history:  Provider(s): Dr. Luis  Last office visit: 2012  Pertinent lab history: elevated RF in 2008  Previous medications tried: Plaquenil (not effective), Sulfasalazine (allergic reaction-coughing)  Current medications: none    She reports having pain in her joints. She has pain in her hands. She has pain all the time. When there is more tingling, the hand will feel more tight. No skin rashes. She had watery eyes before having cataracts removed, this is better now. No dry mouth. No fevers. No shortness of breath. No other joints affected. Sometimes she does have some fatigue.     She does have untreated Hep B. She saw GI a long time ago.     No known family history of autoimmune disorders.          Review of Systems:     Constitutional: negative  Skin: negative  Eyes: negative  Ears/Nose/Throat: negative  Respiratory: No shortness of breath, dyspnea on exertion, cough, or hemoptysis  Cardiovascular: negative  Gastrointestinal: negative  Genitourinary: negative  Musculoskeletal: as above  Neurologic:  negative  Psychiatric: negative  Hematologic/Lymphatic/Immunologic: negative  Endocrine: negative         Active Problem List:     Patient Active Problem List    Diagnosis Date Noted    Heart murmur, systolic 12/12/2024     Priority: Medium     Normal Echo 2019 no valve abnormalities, normal EF, no wal lmotion abnormalities      CKD (chronic kidney disease) stage 4, GFR 15-29 ml/min (H) 08/14/2024     Priority: Medium    Positive FIT (fecal immunochemical test) 02/20/2023     Priority: Medium    Sinus bradycardia 05/17/2022     Priority: Medium    Morbid obesity (H) 11/10/2020     Priority: Medium    Type 2 diabetes mellitus with both eyes affected by mild nonproliferative retinopathy without macular edema, with long-term current use of insulin (H) 11/30/2016     Priority: Medium     ASCVD risk: elevated unable to calculate: on atorvastatin 40    A1c>9  On victoza, invokana, lantus and glipizide: next visit increase invokana if needed and nl renal fxn  1/9/2017          Other lymphedema 05/23/2014     Priority: Medium    Glaucoma of both eyes associated with underlying disease 10/24/2013     Priority: Medium    Dyspepsia 05/23/2013     Priority: Medium    Hepatitis B carrier (H) 12/20/2012     Priority: Medium     Needs Labs yearly and q 6 month ultrasound   AFP and liver ultrasound to screen for HCC every 6 months ((January and July)      Stage 3b chronic kidney disease (H) 12/20/2012     Priority: Medium    Pure hypercholesterolemia 12/20/2012     Priority: Medium    Essential hypertension 12/20/2012     Priority: Medium     Problem list name updated by automated process. Provider to review      Vitamin D deficiency 12/20/2012     Priority: Medium     Problem list name updated by automated process. 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 12/19/2019    1993 Treated at [place]: monitored by [facility] for 6 months -repeated cxr and sputums   exposure to TB 2017.  Declined repeat TB treatment and on CXR monitoring.  Reason for call: FYI: The patient was re expose 12/5/18, recommend 2 years of active monitoring that consist of every 6 month being evaluated by a doctor with a  Symptoms screen and chest xray. The Patient came to their     Major depressive disorder, recurrent episode, moderate (H)     Major depressive disorder, recurrent episode, unspecified 12/20/2012    Obesity, unspecified 12/20/2012    Pure hypercholesterolemia 12/20/2012    Rheumatoid arthritis involving both hands with positive rheumatoid factor (H) 02/24/2016    Rheumatoid arthritis(714.0) 12/20/2012    Type II or unspecified type diabetes mellitus with unspecified complication, uncontrolled 12/20/2012    Onset date: 1990     Unspecified essential hypertension 12/20/2012    Unspecified glaucoma(365.9) 12/20/2012    Walking troubles      Past Surgical History:   Procedure Laterality Date    COLONOSCOPY N/A 6/8/2023    Procedure: COLONOSCOPY WITH POLYPECTOMY;  Surgeon: Manuel Gross MD;  Location: [facility]    NO HISTORY OF SURGERY              Social History:     Social History     Socioeconomic History    Marital status:      Spouse name: Not on file    Number of children: 8    Years of education: Not on file    Highest education level: Not on file   Occupational History    Occupation: Homemaker   Tobacco Use    Smoking status: Never     Passive exposure: Never    Smokeless tobacco: Never     Tobacco comments:     no exposure at home per pt   Vaping Use    Vaping status: Never Used   Substance and Sexual Activity    Alcohol use: No     Alcohol/week: 0.0 standard drinks of alcohol    Drug use: No    Sexual activity: Not on file   Other Topics Concern    Parent/sibling w/ CABG, MI or angioplasty before 65F 55M? Not Asked   Social History Narrative    Lives with adult children        Children: May (1979), Morgan (1981), Anaid (1981), Juanjo (1983), Jewell (1984), Cristhian (1986), Jeff (1987), and Kash Adorno (1993).     Social Drivers of Health     Financial Resource Strain: Low Risk  (10/12/2023)    Financial Resource Strain     Within the past 12 months, have you or your family members you live with been unable to get utilities (heat, electricity) when it was really needed?: No   Food Insecurity: Low Risk  (10/12/2023)    Food Insecurity     Within the past 12 months, did you worry that your food would run out before you got money to buy more?: No     Within the past 12 months, did the food you bought just not last and you didn t have money to get more?: No   Transportation Needs: Low Risk  (10/12/2023)    Transportation Needs     Within the past 12 months, has lack of transportation kept you from medical appointments, getting your medicines, non-medical meetings or appointments, work, or from getting things that you need?: No   Physical Activity: Not on file   Stress: Not on file   Social Connections: Not on file   Interpersonal Safety: Low Risk  (11/26/2024)    Interpersonal Safety     Do you feel physically and emotionally safe where you currently live?: Yes     Within the past 12 months, have you been hit, slapped, kicked or otherwise physically hurt by someone?: No     Within the past 12 months, have you been humiliated or emotionally abused in other ways by your partner or ex-partner?: No   Housing Stability: Low Risk  (10/12/2023)    Housing Stability     Do you have housing? : Yes     Are you worried about  losing your housing?: No          Family History:     Family History   Problem Relation Age of Onset    No Known Problems Mother     No Known Problems Father     No Known Problems Sister     No Known Problems Daughter     No Known Problems Maternal Grandmother     No Known Problems Maternal Grandfather     No Known Problems Paternal Grandmother     No Known Problems Paternal Grandfather     No Known Problems Maternal Aunt     No Known Problems Paternal Aunt     Cancer No family hx of     Diabetes No family hx of     Cardiovascular No family hx of     Coronary Artery Disease No family hx of     Cerebrovascular Disease No family hx of     Breast Cancer No family hx of     Colon Cancer No family hx of     Other Cancer No family hx of     Prostate Cancer No family hx of     Hypertension No family hx of     Asthma No family hx of     Hereditary Breast and Ovarian Cancer Syndrome No family hx of     Endometrial Cancer No family hx of     Ovarian Cancer No family hx of             Allergies:     Allergies   Allergen Reactions    Lisinopril Cough            Medications:     Current Outpatient Medications   Medication Sig Dispense Refill    acetaminophen (TYLENOL) 325 MG tablet Take 1-2 tablets (325-650 mg) by mouth every 6 hours as needed for mild pain. 100 tablet 3    amLODIPine (NORVASC) 10 MG tablet Take 1 tablet (10 mg) by mouth every morning. 90 tablet 3    ammonium lactate (AMLACTIN) 12 % external cream Apply topically 2 times daily 140 g 3    aspirin (ASPIRIN LOW DOSE) 81 MG EC tablet TAKE 1 TABLET(81 MG) BY MOUTH DAILY 90 tablet 3    blood glucose (ACCU-CHEK CHIKA) test strip 360 strips by In Vitro route 4 times daily (before meals and nightly). Use to test blood sugar 3 times daily or as directed. 300 strip 4    blood glucose monitoring (ACCU-CHEK FASTCLIX) lancets Use to test blood sugar 3 times daily or as directed. 102 each 5    calcium carbonate (OS-ROOSEVELT) 1500 (600 Ca) MG tablet Take 1 tablet (600 mg) by mouth 2  "times daily (with meals). 180 tablet 1    capsaicin (ZOSTRIX) 0.025 % external cream APPLY 2GRAMS TO BOTH FEET EVERY DAY AS NEEDED FOR PAIN      diclofenac (VOLTAREN) 1 % topical gel Apply 2 g topically 3 times daily as needed for moderate pain.      empagliflozin (JARDIANCE) 10 MG TABS tablet Take 1 tablet (10 mg) by mouth daily 90 tablet 3    glucose (BD GLUCOSE) 4 g chewable tablet Take 1 tablet by mouth every hour as needed for low blood sugar 10 tablet 4    hydrochlorothiazide (HYDRODIURIL) 25 MG tablet Take 1 tablet (25 mg) by mouth daily. 90 tablet 3    Insulin Aspart FlexPen 100 UNIT/ML SOPN Inject 18-20 Units subcutaneously 3 times daily (with meals). 45 mL 3    insulin glargine (LANTUS PEN) 100 UNIT/ML pen Inject 40 Units subcutaneously at bedtime. 45 mL 3    insulin pen needle (B-D U/F) 31G X 8 MM miscellaneous USE FOR INSULIN INJECT FOUR TIMES DAILY. 180 each 3    liraglutide (VICTOZA) 18 MG/3ML solution Inject 1.8 mg subcutaneously daily. 27 mL 3    losartan (COZAAR) 100 MG tablet TAKE 1 TABLET(100 MG) BY MOUTH DAILY 90 tablet 3    omeprazole (PRILOSEC) 20 MG DR capsule Take 1 capsule (20 mg) by mouth daily 90 capsule 3    rosuvastatin (CRESTOR) 10 MG tablet TAKE 1 TABLET(10 MG) BY MOUTH DAILY 90 tablet 3    sodium bicarbonate 650 MG tablet Take 2 tablets by mouth 2 times daily.      VITAMIN D3 25 MCG (1000 UT) tablet TAKE 1 TABLET BY MOUTH EVERY DAY 90 tablet 3    fluticasone (FLONASE) 50 MCG/ACT nasal spray SHAKE LIQUID AND USE 1 SPRAY IN EACH NOSTRIL DAILY (Patient not taking: Reported on 2/20/2025) 16 g 3    latanoprost (XALATAN) 0.005 % ophthalmic solution INSTILL 1 DROP INTO THE AFFECTED EYES ONCE DAILY AS DIRECTED. (Patient not taking: Reported on 2/20/2025) 5 mL 11    order for DME Equipment being ordered:     Massage tube, freeze and use to massage heel twice daily 1 Device 0            Physical Exam:   Blood pressure (!) 148/48, pulse 56, height 1.397 m (4' 7\"), weight 71.4 kg (157 lb 8 oz), " SpO2 98%.  Wt Readings from Last 6 Encounters:   02/20/25 71.4 kg (157 lb 8 oz)   01/30/25 69.9 kg (154 lb 1.3 oz)   12/12/24 69.4 kg (153 lb)   11/26/24 69.9 kg (154 lb)   09/12/24 70.8 kg (156 lb)   08/13/24 70.3 kg (155 lb)     Constitutional: well-developed, appearing stated age; cooperative  Eyes: nl conjunctiva, sclera  ENT: nl external ears, hearing,   Neck: no mass or thyroid enlargement  Resp: No shortness of breath with normal conversation  MS: Active synovitis over the pes.  Chronic deformity of the left second digit.  Decreased fist formation.  Skin: no nail pitting, alopecia, rash, nodules or lesions.   Psych: nl judgement, orientation, memory, affect.           Data:   Imaging:  MRI left shoulder 08/08/2020  IMPRESSION:  1.  Severe supraspinatus tendinopathy within the anterior half with some nonlinear appearing reparative granulation tissue suggesting there is some underlying intrasubstance partial-thickness tearing but there is no full-thickness tearing or retraction.      There is minimal atrophy of the supraspinatus muscle belly.  2.  Mild subscapularis tendinopathy without evidence of tearing.  3.  Mild tendinopathy within the infraspinatus without evidence of tearing.  4.  Tiny enthesophyte at the acromial tip.  5.  Trace fluid in the subacromial subdeltoid bursa.  6.  Hypertrophic change of the acromioclavicular joint.  7.  Degenerative signal abnormality within the superior labrum but no evidence for detached tearing.  8.  Tiny shoulder joint effusion.    Laboratory:  Creatinine 1.88, GFR 20  Urine positive for protein  Hemoglobin A1c 7.9  Hemoglobin 11.5

## 2025-02-20 NOTE — TELEPHONE ENCOUNTER
----- Message from Laura TOLLIVER sent at 2/20/2025  1:58 PM CST -----  Can you call the pt for me please?  ----- Message -----  From: Zari Strickland PA-C  Sent: 2/20/2025  11:36 AM CST  To: Rheumatology Support Pool    Please let patient know that her xrays are showing severe erosive changes in her left second finger. I would recommend meeting with MTM to review potential medication options. Can place referral if she would like to meet with them. If she is not interested in medications, that damage could progress/get worse    Zari Strickland, [facility]

## 2025-02-20 NOTE — PATIENT INSTRUCTIONS
After Visit Instructions:     Thank you for coming to [facility] for your care. It is my goal to partner with you to help you reach your optimal state of health.       Plan:     Schedule follow-up with Zari Strickland PA-C as needed  Imaging: xray hands and wrists today  Labs: CCP antibody, Rheumatoid factor, CRP and Sed rate with your next lab draw  Medication recommendations:   Would recommend having you meet with our [facility] pharmacist prior to starting medications. If you did start a medication, I would want to meet with you 3 months after starting.       Zari Strickland PA-C  [facility]  [facility]    Contact information: [facility]  Clinic Number:  [phone]  Please call or send a [employer] message with any questions about your care

## 2025-02-20 NOTE — TELEPHONE ENCOUNTER
Called and spoke to patient and relayed the message below to her.     Per patient said that finger has been going on like that for a long time and before she was told to do surgery but refused.   Per patient are taking too much medications right now and don't want the MTM referral now. Per patient now there is no pain, so in the future if patient change her mind, she will inform us again.     No further question.     ANDREINA Hameed.

## 2025-03-03 NOTE — PROGRESS NOTES
Assessment & Plan     (E11.3293,  Z79.4) Type 2 diabetes mellitus with both eyes affected by mild nonproliferative retinopathy without macular edema, with long-term current use of insulin (H)  (primary encounter diagnosis)  Comment: sugars are at goal, BP not at goal, plans for repeat eye check  Plan: Hemoglobin A1c, Lipid Panel (LabDAQ), insulin         glargine (LANTUS PEN) 100 UNIT/ML pen        No changes to current medications    (N18.4) CKD (chronic kidney disease) stage 4, GFR 15-29 ml/min (H)  Comment: following and will change BP meds slightly  Plan: Basic metabolic panel, Albumin Random Urine         Quantitative with Creat Ratio,         amLODIPine-valsartan (EXFORGE)  MG tablet        Discussed stop losartan 100 and amlodipine 10, instead switch to this combined pill once daily,   -awaiting BMp    (Z68.35) Body mass index (BMI) 35, adult (H)  Comment: on victoza and slight decline  No changes to current meds    (B18.1) Hepatitis B carrier (H)  Comment: has not seen GI hepatology since 2019   Plan: Hep B Virus DNA Quant Real Time PCR, US Abdomen        Limited, AFP tumor marker        Declines referral but would consider again, continue monitoring due to high viral load    (M85.852) Osteopenia of neck of left femur  Comment: states current calcium pill too large  Plan: calcium carbonate 750 MG CHEW        Trial of chewable see if improved    (E11.42,  Z79.4) Type 2 diabetes mellitus with diabetic polyneuropathy, with long-term current use of insulin (H)  Comment: see above controlled sugars  Plan: insulin glargine (LANTUS PEN) 100 UNIT/ML pen        No changes    (M06.4) Inflammatory polyarthropathy (H)  Comment: reviewed xrays from rheumatology  Plan: labs drawn, possibly all traumatic, denies aches/pains in joints, denies any other joint problems    (I10) Essential hypertension  Comment: see above CKD: uncontrolled  Plan: amLODIPine-valsartan (EXFORGE)  MG tablet        Change med and home  "BP phone follow up in 2 weeks.      The longitudinal plan of care for the diagnosis(es)/condition(s) as documented were addressed during this visit. Due to the added complexity in care, I will continue to support Omid in the subsequent management and with ongoing continuity of care.      51 minutes spent by me on the date of the encounter doing chart review, history and exam, documentation and further activities per the note    BMI  Estimated body mass index is 35.34 kg/m  as calculated from the following:    Height as of this encounter: 1.415 m (4' 7.71\").    Weight as of this encounter: 70.8 kg (156 lb).             No follow-ups on file.    Cornelia Anen is a 69 year old, presenting for the following health issues:  Diabetes    HPI        Diabetes Follow-up  Lantus 44 units at bedtime, Novolog 18-20 with meals, Victoza 1.8 daily    How often are you checking your blood sugar? Before breakfast 130-140s, afternoon 180s  What concerns do you have today about your diabetes? None   Do you have any of these symptoms? (Select all that apply)  No numbness or tingling in feet.  No redness, sores or blisters on feet.  No complaints of excessive thirst.  No reports of blurry vision.  No significant changes to weight.  Have you had a diabetic eye exam in the last 12 months? No  -goes to [facility] on [address] and will go again, had cataract last year so thought that counted      BP Readings from Last 2 Encounters:   03/04/25 (!) 149/63   02/20/25 (!) 148/48     Hemoglobin A1C (%)   Date Value   03/04/2025 7.4 (H)   11/26/2024 7.9 (H)   06/03/2021 6.8 (H)   03/02/2021 7.3 (H)     LDL Cholesterol Calculated (mg/dL)   Date Value   03/04/2025 58   03/19/2024 52   07/24/2012 83   11/08/2011 98     LDL Cholesterol Direct (mg/dL)   Date Value   10/02/2020 52.0   03/08/2019 49.0             Chronic Kidney Disease Follow-up    Do you take any over the counter pain medicine?: No    HTN: Losartan 100 and hydrochlorothiazide 25 and " "amlodipine 10  Not checking home BP  Took medications this AM  How many days per week do you miss taking your medication? 0            Objective    BP (!) 149/63   Pulse 62   Temp 98.3  F (36.8  C) (Oral)   Resp 20   Ht 1.415 m (4' 7.71\")   Wt 70.8 kg (156 lb)   SpO2 95%   BMI 35.34 kg/m    Body mass index is 35.34 kg/m .  Physical Exam   GENERAL: alert and no distress  RESP: lungs clear to auscultation - no rales, rhonchi or wheezes  CV: regular rate and rhythm, normal S1 S2, no S3 or S4, no murmur, click or rub, no peripheral edema  MS: mild 1+ edema  Left hand with single traumatic deformed left PIP joint (young adult infection in [facility])    Results for orders placed or performed in visit on 03/04/25 (from the past 24 hours)   Lipid Panel (LabDAQ)   Result Value Ref Range    Cholesterol 132 <200 mg/dL    Triglycerides 120 <150 mg/dL    Direct Measure HDL 50 >=50 mg/dL    LDL Cholesterol Calculated 58 <100 mg/dL    Non HDL Cholesterol 82 <130 mg/dL    Patient Fasting > 8hrs? Unknown     Narrative    Cholesterol  Desirable: < 200 mg/dL  Borderline High: 200 - 239 mg/dL  High: >= 240 mg/dL    Triglycerides  Normal: < 150 mg/dL  Borderline High: 150 - 199 mg/dL  High: 200-499 mg/dL  Very High: >= 500 mg/dL    Direct Measure HDL  Female: >= 50 mg/dL   Male: >= 40 mg/dL    LDL Cholesterol  Desirable: < 100 mg/dL  Above Desirable: 100 - 129 mg/dL   Borderline High: 130 - 159 mg/dL   High:  160 - 189 mg/dL   Very High: >= 190 mg/dL    Non HDL Cholesterol  Desirable: < 130 mg/dL  Above Desirable: 130 - 159 mg/dL  Borderline High: 160 - 189 mg/dL  High: 190 - 219 mg/dL  Very High: >= 220 mg/dL   Hemoglobin A1c   Result Value Ref Range    Estimated Average Glucose 166 (H) <117 mg/dL    Hemoglobin A1C 7.4 (H) 0.0 - 5.6 %   Basic metabolic panel   Result Value Ref Range    Sodium 138 135 - 145 mmol/L    Potassium 4.6 3.4 - 5.3 mmol/L    Chloride 108 (H) 98 - 107 mmol/L    Carbon Dioxide (CO2) 20 (L) 22 - 29 mmol/L    " Anion Gap 10 7 - 15 mmol/L    Urea Nitrogen 62.8 (H) 8.0 - 23.0 mg/dL    Creatinine 1.90 (H) 0.51 - 0.95 mg/dL    GFR Estimate 28 (L) >60 mL/min/1.73m2    Calcium 8.9 8.8 - 10.4 mg/dL    Glucose 227 (H) 70 - 99 mg/dL    Patient Fasting > 8hrs? Unknown    Albumin Random Urine Quantitative with Creat Ratio   Result Value Ref Range    Creatinine Urine mg/dL 41.7 mg/dL    Albumin Urine mg/L 264.0 mg/L    Albumin Urine mg/g Cr 633.09 (H) 0.00 - 25.00 mg/g Cr   CRP inflammation   Result Value Ref Range    CRP Inflammation <3.00 <5.00 mg/L   Erythrocyte sedimentation rate auto   Result Value Ref Range    Erythrocyte Sedimentation Rate 89 (H) 0 - 30 mm/hr   Rheumatoid factor   Result Value Ref Range    Rheumatoid Factor <10 <14 IU/mL   AFP tumor marker   Result Value Ref Range    AFP tumor marker 2.4 <=8.3 ng/mL    Narrative    This result is obtained using the Roche Elecsys AFP method on  the curtis e801 immunoassay analyzer. Results obtained with different assay methods or kits cannot be used interchangeably.  Reference ranges apply to non-pregnant females only.           Signed Electronically by: Shannan Benson MD

## 2025-03-04 ENCOUNTER — OFFICE VISIT (OUTPATIENT)
Dept: FAMILY MEDICINE | Facility: CLINIC | Age: 70
End: 2025-03-04
Payer: COMMERCIAL

## 2025-03-04 VITALS
HEART RATE: 62 BPM | DIASTOLIC BLOOD PRESSURE: 63 MMHG | HEIGHT: 56 IN | BODY MASS INDEX: 35.09 KG/M2 | TEMPERATURE: 98.3 F | WEIGHT: 156 LBS | OXYGEN SATURATION: 95 % | SYSTOLIC BLOOD PRESSURE: 149 MMHG | RESPIRATION RATE: 20 BRPM

## 2025-03-04 DIAGNOSIS — E11.3293 TYPE 2 DIABETES MELLITUS WITH BOTH EYES AFFECTED BY MILD NONPROLIFERATIVE RETINOPATHY WITHOUT MACULAR EDEMA, WITH LONG-TERM CURRENT USE OF INSULIN (H): Primary | ICD-10-CM

## 2025-03-04 DIAGNOSIS — Z79.4 TYPE 2 DIABETES MELLITUS WITH DIABETIC POLYNEUROPATHY, WITH LONG-TERM CURRENT USE OF INSULIN (H): ICD-10-CM

## 2025-03-04 DIAGNOSIS — Z79.4 TYPE 2 DIABETES MELLITUS WITH BOTH EYES AFFECTED BY MILD NONPROLIFERATIVE RETINOPATHY WITHOUT MACULAR EDEMA, WITH LONG-TERM CURRENT USE OF INSULIN (H): Primary | ICD-10-CM

## 2025-03-04 DIAGNOSIS — B18.1 HEPATITIS B CARRIER (H): ICD-10-CM

## 2025-03-04 DIAGNOSIS — I10 ESSENTIAL HYPERTENSION: ICD-10-CM

## 2025-03-04 DIAGNOSIS — M85.852 OSTEOPENIA OF NECK OF LEFT FEMUR: ICD-10-CM

## 2025-03-04 DIAGNOSIS — M06.4 INFLAMMATORY POLYARTHROPATHY (H): ICD-10-CM

## 2025-03-04 DIAGNOSIS — E11.42 TYPE 2 DIABETES MELLITUS WITH DIABETIC POLYNEUROPATHY, WITH LONG-TERM CURRENT USE OF INSULIN (H): ICD-10-CM

## 2025-03-04 DIAGNOSIS — N18.4 CKD (CHRONIC KIDNEY DISEASE) STAGE 4, GFR 15-29 ML/MIN (H): ICD-10-CM

## 2025-03-04 LAB
AFP SERPL-MCNC: 2.4 NG/ML
ANION GAP SERPL CALCULATED.3IONS-SCNC: 10 MMOL/L (ref 7–15)
BUN SERPL-MCNC: 62.8 MG/DL (ref 8–23)
CALCIUM SERPL-MCNC: 8.9 MG/DL (ref 8.8–10.4)
CHLORIDE SERPL-SCNC: 108 MMOL/L (ref 98–107)
CHOLEST SERPL-MCNC: 132 MG/DL
CREAT SERPL-MCNC: 1.9 MG/DL (ref 0.51–0.95)
CREAT UR-MCNC: 41.7 MG/DL
CRP SERPL-MCNC: <3 MG/L
EGFRCR SERPLBLD CKD-EPI 2021: 28 ML/MIN/1.73M2
ERYTHROCYTE [SEDIMENTATION RATE] IN BLOOD BY WESTERGREN METHOD: 89 MM/HR (ref 0–30)
EST. AVERAGE GLUCOSE BLD GHB EST-MCNC: 166 MG/DL
FASTING STATUS PATIENT QL REPORTED: ABNORMAL
FASTING STATUS PATIENT QL REPORTED: NORMAL
GLUCOSE SERPL-MCNC: 227 MG/DL (ref 70–99)
HBA1C MFR BLD: 7.4 % (ref 0–5.6)
HCO3 SERPL-SCNC: 20 MMOL/L (ref 22–29)
HDLC SERPL-MCNC: 50 MG/DL
LDLC SERPL CALC-MCNC: 58 MG/DL
MICROALBUMIN UR-MCNC: 264 MG/L
MICROALBUMIN/CREAT UR: 633.09 MG/G CR (ref 0–25)
NONHDLC SERPL-MCNC: 82 MG/DL
POTASSIUM SERPL-SCNC: 4.6 MMOL/L (ref 3.4–5.3)
RHEUMATOID FACT SERPL-ACNC: <10 IU/ML
SODIUM SERPL-SCNC: 138 MMOL/L (ref 135–145)
TRIGL SERPL-MCNC: 120 MG/DL

## 2025-03-04 PROCEDURE — 82570 ASSAY OF URINE CREATININE: CPT | Performed by: FAMILY MEDICINE

## 2025-03-04 PROCEDURE — G2211 COMPLEX E/M VISIT ADD ON: HCPCS | Performed by: FAMILY MEDICINE

## 2025-03-04 PROCEDURE — 80061 LIPID PANEL: CPT | Performed by: FAMILY MEDICINE

## 2025-03-04 PROCEDURE — 3077F SYST BP >= 140 MM HG: CPT | Performed by: FAMILY MEDICINE

## 2025-03-04 PROCEDURE — 82105 ALPHA-FETOPROTEIN SERUM: CPT | Performed by: FAMILY MEDICINE

## 2025-03-04 PROCEDURE — 83036 HEMOGLOBIN GLYCOSYLATED A1C: CPT | Performed by: FAMILY MEDICINE

## 2025-03-04 PROCEDURE — 99215 OFFICE O/P EST HI 40 MIN: CPT | Performed by: FAMILY MEDICINE

## 2025-03-04 PROCEDURE — 3078F DIAST BP <80 MM HG: CPT | Performed by: FAMILY MEDICINE

## 2025-03-04 PROCEDURE — 82043 UR ALBUMIN QUANTITATIVE: CPT | Performed by: FAMILY MEDICINE

## 2025-03-04 PROCEDURE — 36415 COLL VENOUS BLD VENIPUNCTURE: CPT | Performed by: FAMILY MEDICINE

## 2025-03-04 PROCEDURE — 80048 BASIC METABOLIC PNL TOTAL CA: CPT | Performed by: FAMILY MEDICINE

## 2025-03-04 RX ORDER — AMLODIPINE AND VALSARTAN 10; 320 MG/1; MG/1
1 TABLET ORAL DAILY
Qty: 90 TABLET | Refills: 1 | Status: SHIPPED | OUTPATIENT
Start: 2025-03-04

## 2025-03-05 ENCOUNTER — APPOINTMENT (OUTPATIENT)
Dept: INTERPRETER SERVICES | Facility: CLINIC | Age: 70
End: 2025-03-05
Payer: COMMERCIAL

## 2025-03-05 LAB
CCP AB SER IA-ACNC: 1.5 U/ML
HBV DNA SERPL NAA+PROBE-ACNC: ABNORMAL IU/ML
HBV DNA SERPL NAA+PROBE-LOG IU: 5.9 {LOG_IU}/ML

## 2025-03-05 NOTE — RESULT ENCOUNTER NOTE
Call patient:  1. Kidney function remains stable (stage 4)  2. Urine shows similar protein levels.  3. Liver test is reassuring (AFP).  4. Cholesterol levels in range.    No further changes to medications.  Continue with regular follow ups.

## 2025-03-05 NOTE — RESULT ENCOUNTER NOTE
Call patient:  hepatitis B is still detectable in the blood and about the same level as 2020.  At the next visit can discuss after ultrasound of liver.

## 2025-03-12 ENCOUNTER — TELEPHONE (OUTPATIENT)
Dept: FAMILY MEDICINE | Facility: CLINIC | Age: 70
End: 2025-03-12
Payer: COMMERCIAL

## 2025-03-12 DIAGNOSIS — M85.852 OSTEOPENIA OF NECK OF LEFT FEMUR: ICD-10-CM

## 2025-03-12 NOTE — TELEPHONE ENCOUNTER
[facility] phone call message- medication clarification/question:    Full Medication Name: calcium carbonate 750 MG CHEW        Question: patient's daughter called in requesting a full prescription on the above medication -- sommer is tolerating well and likes the chews-- please send to below pharmacy     Pharmacy confirmed as    [id number] - [place], [place] - [address] AT [address]  Yes    OK to leave a message on voice mail? Yes    Primary language: Hmong      needed? Yes    Call taken on March 12, 2025 at 3:24 PM by Lois Orellana

## 2025-03-19 DIAGNOSIS — E11.3293 TYPE 2 DIABETES MELLITUS WITH BOTH EYES AFFECTED BY MILD NONPROLIFERATIVE RETINOPATHY WITHOUT MACULAR EDEMA, WITH LONG-TERM CURRENT USE OF INSULIN (H): ICD-10-CM

## 2025-03-19 DIAGNOSIS — Z79.4 TYPE 2 DIABETES MELLITUS WITH BOTH EYES AFFECTED BY MILD NONPROLIFERATIVE RETINOPATHY WITHOUT MACULAR EDEMA, WITH LONG-TERM CURRENT USE OF INSULIN (H): ICD-10-CM

## 2025-03-20 ENCOUNTER — PATIENT OUTREACH (OUTPATIENT)
Dept: FAMILY MEDICINE | Facility: CLINIC | Age: 70
End: 2025-03-20
Payer: COMMERCIAL

## 2025-03-20 RX ORDER — EMPAGLIFLOZIN 10 MG/1
10 TABLET, FILM COATED ORAL DAILY
Qty: 90 TABLET | Refills: 3 | Status: SHIPPED | OUTPATIENT
Start: 2025-03-20

## 2025-03-20 NOTE — TELEPHONE ENCOUNTER
Patient Quality Outreach    Patient is due for the following:   Diabetes -  A1C    Action(s) Taken:   Patient has upcoming appointment, these items will be addressed at that time.    Type of outreach:    Chart review performed, no outreach needed.    Questions for provider review:    None         Cisco Gil CMA  Chart routed to Care Team.

## 2025-03-26 ENCOUNTER — PATIENT OUTREACH (OUTPATIENT)
Dept: CARE COORDINATION | Facility: CLINIC | Age: 70
End: 2025-03-26
Payer: COMMERCIAL

## 2025-04-01 ENCOUNTER — TRANSFERRED RECORDS (OUTPATIENT)
Dept: MULTI SPECIALTY CLINIC | Facility: CLINIC | Age: 70
End: 2025-04-01
Payer: COMMERCIAL

## 2025-04-01 LAB — RETINOPATHY: NORMAL

## 2025-04-07 DIAGNOSIS — Z79.4 TYPE 2 DIABETES MELLITUS WITH DIABETIC POLYNEUROPATHY, WITH LONG-TERM CURRENT USE OF INSULIN (H): ICD-10-CM

## 2025-04-07 DIAGNOSIS — E11.42 TYPE 2 DIABETES MELLITUS WITH DIABETIC POLYNEUROPATHY, WITH LONG-TERM CURRENT USE OF INSULIN (H): ICD-10-CM

## 2025-04-07 RX ORDER — INSULIN ASPART 100 [IU]/ML
18-20 INJECTION, SOLUTION INTRAVENOUS; SUBCUTANEOUS
Qty: 45 ML | Refills: 0 | Status: SHIPPED | OUTPATIENT
Start: 2025-04-07

## 2025-04-07 NOTE — TELEPHONE ENCOUNTER
Patient insulin has been on back order for 3 weeks with [facility]. Would like it sent to [place] pharmacy. Thank you

## 2025-04-07 NOTE — TELEPHONE ENCOUNTER
Writer chart reviewed, no evidence of insulin pump. Filling per RN standing orders. Navid FELICIANO

## 2025-04-08 NOTE — TELEPHONE ENCOUNTER
I called [facility] and [facility] for Daughter Jamar to call [place] pharmacy - prescription was sent. Thank you

## 2025-04-14 DIAGNOSIS — K21.9 GASTROESOPHAGEAL REFLUX DISEASE WITHOUT ESOPHAGITIS: ICD-10-CM

## 2025-04-14 RX ORDER — OMEPRAZOLE 20 MG/1
20 CAPSULE, DELAYED RELEASE ORAL DAILY
Qty: 90 CAPSULE | Refills: 3 | Status: SHIPPED | OUTPATIENT
Start: 2025-04-14

## 2025-04-21 NOTE — PROGRESS NOTES
"  Assessment & Plan     (E11.0763,  Z79.4) Type 2 diabetes mellitus with both eyes affected by mild nonproliferative retinopathy without macular edema, with long-term current use of insulin (H)  (primary encounter diagnosis)  Comment: refilled supplies and medications  Plan: do not see the jardiance with her, patient unsure but will check with daughter    (N18.4) CKD (chronic kidney disease) stage 4, GFR 15-29 ml/min (H)  Comment: no changes in asymptomatic or urine output  Plan: Hemoglobin, Hemoglobin, amLODIPine-valsartan         (EXFORGE)  MG tablet        Confirmed ARB, stable hemoglobin    (Z68.41) Body mass index (BMI) 40.0-44.9, adult (H)  Comment: patient has slowly lost weight    Plan: down 20# since 2020, working on healthy habits    (I10) Essential hypertension  Comment: not at goal but missing hydrochlorothiazide in the room and jardiance  Plan: amLODIPine-valsartan (EXFORGE)  MG         tablet, hydrochlorothiazide (HYDRODIURIL) 25 MG        tablet        Recommend patient restart these, she will ask her daughter about setting up and phone visit follow-up in 2 weeks    (E11.42,  Z79.4) Type 2 diabetes mellitus with diabetic polyneuropathy, with long-term current use of insulin (H)  Comment: refilled  Plan: aspirin (ASPIRIN LOW DOSE) 81 MG EC tablet            (M05.741,  M05.742) Rheumatoid arthritis 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    Weight as of this encounter: 71.3 kg (157 lb 1.9 oz).       The longitudinal plan of care for the diagnosis(es)/condition(s) as documented were addressed during this visit. Due to the added complexity in care, I will continue to support Omid in the subsequent management and with ongoing continuity of care.      Subjective   Anne " "is a 69 year old, presenting for the following health issues:  Hypertension (BP Follow Up- no other concerns to present ) and Refill Request (Please go over medication list with patient does not know which ones need to be refilled )    HPI        Hypertension Follow-up    Do you check your blood pressure regularly outside of the clinic? Yes   Are you following a low salt diet? Yes  Are your blood pressures ever more than 140 on the top number (systolic) OR more   than 90 on the bottom number (diastolic), for example 140/90? No    All readings at home are 120s/60s    BP Readings from Last 2 Encounters:   04/22/25 (!) 152/69   03/04/25 (!) 149/63                 Objective    BP (!) 152/69 (BP Location: Right arm, Patient Position: Sitting, Cuff Size: Adult Large)   Pulse (!) 48   Temp 98.4  F (36.9  C) (Oral)   Resp 20   Ht 1.42 m (4' 7.91\")   Wt 71.3 kg (157 lb 1.9 oz)   SpO2 97%   BMI 35.34 kg/m    Body mass index is 35.34 kg/m .  Physical Exam   GENERAL: alert and no distress  RESP: lungs clear to auscultation - no rales, rhonchi or wheezes  CV: regular rate and rhythm, normal S1 S2, no S3 or S4, no murmur, click or rub, no peripheral edema  MS: no edema    Results for orders placed or performed in visit on 04/22/25 (from the past 24 hours)   Hemoglobin   Result Value Ref Range    Hemoglobin 11.0 (L) 11.7 - 15.7 g/dL           Signed Electronically by: Shannan Benson MD    "

## 2025-04-22 ENCOUNTER — OFFICE VISIT (OUTPATIENT)
Dept: FAMILY MEDICINE | Facility: CLINIC | Age: 70
End: 2025-04-22
Payer: COMMERCIAL

## 2025-04-22 VITALS
DIASTOLIC BLOOD PRESSURE: 69 MMHG | OXYGEN SATURATION: 97 % | HEART RATE: 48 BPM | WEIGHT: 157.12 LBS | BODY MASS INDEX: 35.35 KG/M2 | SYSTOLIC BLOOD PRESSURE: 152 MMHG | TEMPERATURE: 98.4 F | HEIGHT: 56 IN | RESPIRATION RATE: 20 BRPM

## 2025-04-22 DIAGNOSIS — M05.741 RHEUMATOID ARTHRITIS INVOLVING BOTH HANDS WITH POSITIVE RHEUMATOID FACTOR (H): ICD-10-CM

## 2025-04-22 DIAGNOSIS — E11.42 TYPE 2 DIABETES MELLITUS WITH DIABETIC POLYNEUROPATHY, WITH LONG-TERM CURRENT USE OF INSULIN (H): ICD-10-CM

## 2025-04-22 DIAGNOSIS — Z79.4 TYPE 2 DIABETES MELLITUS WITH BOTH EYES AFFECTED BY MILD NONPROLIFERATIVE RETINOPATHY WITHOUT MACULAR EDEMA, WITH LONG-TERM CURRENT USE OF INSULIN (H): Primary | ICD-10-CM

## 2025-04-22 DIAGNOSIS — Z79.4 TYPE 2 DIABETES MELLITUS WITH DIABETIC POLYNEUROPATHY, WITH LONG-TERM CURRENT USE OF INSULIN (H): ICD-10-CM

## 2025-04-22 DIAGNOSIS — I10 ESSENTIAL HYPERTENSION: ICD-10-CM

## 2025-04-22 DIAGNOSIS — E11.3293 TYPE 2 DIABETES MELLITUS WITH BOTH EYES AFFECTED BY MILD NONPROLIFERATIVE RETINOPATHY WITHOUT MACULAR EDEMA, WITH LONG-TERM CURRENT USE OF INSULIN (H): Primary | ICD-10-CM

## 2025-04-22 DIAGNOSIS — N18.4 CKD (CHRONIC KIDNEY DISEASE) STAGE 4, GFR 15-29 ML/MIN (H): ICD-10-CM

## 2025-04-22 DIAGNOSIS — M05.742 RHEUMATOID ARTHRITIS INVOLVING BOTH HANDS WITH POSITIVE RHEUMATOID FACTOR (H): ICD-10-CM

## 2025-04-22 LAB — HGB BLD-MCNC: 11 G/DL (ref 11.7–15.7)

## 2025-04-22 RX ORDER — HYDROCHLOROTHIAZIDE 25 MG/1
25 TABLET ORAL DAILY
Qty: 90 TABLET | Refills: 0 | Status: SHIPPED | OUTPATIENT
Start: 2025-04-22

## 2025-04-22 RX ORDER — ASPIRIN 81 MG/1
81 TABLET, COATED ORAL DAILY
Qty: 90 TABLET | Refills: 3 | Status: SHIPPED | OUTPATIENT
Start: 2025-04-22

## 2025-04-22 RX ORDER — ACETAMINOPHEN 325 MG/1
325-650 TABLET ORAL EVERY 6 HOURS PRN
Qty: 100 TABLET | Refills: 3 | Status: SHIPPED | OUTPATIENT
Start: 2025-04-22

## 2025-04-22 RX ORDER — AMLODIPINE AND VALSARTAN 10; 320 MG/1; MG/1
1 TABLET ORAL DAILY
Qty: 90 TABLET | Refills: 1 | Status: SHIPPED | OUTPATIENT
Start: 2025-04-22

## 2025-04-22 NOTE — PATIENT INSTRUCTIONS
Make sure that patient has these two medications:    Jardiance 10 mg daily (helps for sugar control, kidney health and blood pressure)    Hydrochlorothiazide 25 mg daily (a water pill that helps blood pressure)

## 2025-05-06 ENCOUNTER — TELEPHONE (OUTPATIENT)
Dept: FAMILY MEDICINE | Facility: CLINIC | Age: 70
End: 2025-05-06
Payer: COMMERCIAL

## 2025-05-06 NOTE — TELEPHONE ENCOUNTER
Patient Quality Outreach    Patient is due for the following:   Diabetes -  A1C  Hypertension -  Hypertension follow-up visit    Action(s) Taken:   Patient has upcoming appointment, these items will be addressed at that time.    Type of outreach:    Chart review performed, no outreach needed.    Questions for provider review:    None         Cisco Gil CMA  Chart routed to None.

## 2025-05-20 ENCOUNTER — VIRTUAL VISIT (OUTPATIENT)
Dept: FAMILY MEDICINE | Facility: CLINIC | Age: 70
End: 2025-05-20
Payer: COMMERCIAL

## 2025-05-20 DIAGNOSIS — N18.4 CKD (CHRONIC KIDNEY DISEASE) STAGE 4, GFR 15-29 ML/MIN (H): ICD-10-CM

## 2025-05-20 DIAGNOSIS — I10 ESSENTIAL HYPERTENSION: Primary | ICD-10-CM

## 2025-05-20 RX ORDER — SODIUM BICARBONATE 650 MG/1
650 TABLET ORAL 2 TIMES DAILY
Qty: 180 TABLET | Refills: 3 | Status: SHIPPED | OUTPATIENT
Start: 2025-05-20

## 2025-05-20 NOTE — PROGRESS NOTES
Family Medicine Telephone Visit Note      Chief Complaint   Patient presents with    Hypertension     Follow up     Due to patient being non-English speaking/uses sign language, an  was used for this visit. Only for face-to-face interpretation by an external agency, date and length of interpretation can be found on the scanned worksheet.     name:   Agency: [employer] - telephone  Language: Bobby   Telephone number: [phone]  Type of interpretation: Telephone, spoken         HPI   Patients name: Omid  Appointment start time:  4:26 PM  Omid is a 69 year old who is being evaluated via a billable telephone visit.      Originating Location (pt. Location): Home    Distant Location (provider location):  On-site  Telephone visit completed due to the patient did not have access to video, while the distant provider did.  Phone call duration: 20 minutes      Hypertension Follow-up    Outpatient blood pressures are being checked at home.  Results are 120-140 systolic  Chest Pain? :No   Low Salt Diet: low salt  Daily NSAID Use?No   Did patient take their HTN pills today/last night as usual?  Yes    Last Basic Metabolic Panel:  Lab Results   Component Value Date     03/04/2025    .0 03/02/2021      Lab Results   Component Value Date    POTASSIUM 4.6 03/04/2025    POTASSIUM 4.3 05/03/2022    POTASSIUM 4.6 03/02/2021     Lab Results   Component Value Date    CHLORIDE 108 03/04/2025    CHLORIDE 113 05/03/2022    CHLORIDE 109.0 03/02/2021     Lab Results   Component Value Date    ROOSEVELT 8.9 03/04/2025    ROOSEVELT 9.5 03/02/2021     Lab Results   Component Value Date    CO2 20 03/04/2025    CO2 20 05/03/2022    CO2 23.0 03/02/2021     Lab Results   Component Value Date    BUN 62.8 03/04/2025    BUN 54 05/03/2022    BUN 43.0 03/02/2021     Lab Results   Component Value Date    CR 1.90 03/04/2025    CR 1.5 03/02/2021     Lab Results   Component Value Date     03/04/2025      06/08/2023    GLC 72 05/03/2022    .0 03/02/2021       Adherence and Exercise  Medication side effects: no  How often is a medication missed? Never    Current Outpatient Medications   Medication Sig Dispense Refill    sodium bicarbonate 650 MG tablet Take 1 tablet (650 mg) by mouth 2 times daily. 180 tablet 3    acetaminophen (TYLENOL) 325 MG tablet Take 1-2 tablets (325-650 mg) by mouth every 6 hours as needed for mild pain. 100 tablet 3    amLODIPine-valsartan (EXFORGE)  MG tablet Take 1 tablet by mouth daily. 90 tablet 1    ammonium lactate (AMLACTIN) 12 % external cream Apply topically 2 times daily 140 g 3    aspirin (ASPIRIN LOW DOSE) 81 MG EC tablet Take 1 tablet (81 mg) by mouth daily. 90 tablet 3    blood glucose (ACCU-CHEK CHIKA) test strip 360 strips by In Vitro route 4 times daily (before meals and nightly). Use to test blood sugar 3 times daily or as directed. 300 strip 4    blood glucose monitoring (ACCU-CHEK FASTCLIX) lancets Use to test blood sugar 3 times daily or as directed. 102 each 5    calcium carbonate (OS-ROOSEVELT) 1500 (600 Ca) MG tablet Take 1 tablet (600 mg) by mouth 2 times daily (with meals). 180 tablet 1    calcium carbonate 750 MG CHEW Take 1 tablet (750 mg) by mouth daily. 90 tablet 3    capsaicin (ZOSTRIX) 0.025 % external cream APPLY 2GRAMS TO BOTH FEET EVERY DAY AS NEEDED FOR PAIN      diclofenac (VOLTAREN) 1 % topical gel Apply 2 g topically 3 times daily as needed for moderate pain.      glucose (BD GLUCOSE) 4 g chewable tablet Take 1 tablet by mouth every hour as needed for low blood sugar 10 tablet 4    hydrochlorothiazide (HYDRODIURIL) 25 MG tablet Take 1 tablet (25 mg) by mouth daily. 90 tablet 0    Insulin Aspart FlexPen 100 UNIT/ML SOPN Inject 18-20 Units subcutaneously 3 times daily (with meals). 45 mL 0    insulin glargine (LANTUS PEN) 100 UNIT/ML pen Inject 44 Units subcutaneously at bedtime. 45 mL 3    insulin pen needle (B-D U/F) 31G X 8 MM miscellaneous USE  "FOR INSULIN INJECT FOUR TIMES DAILY. 180 each 3    JARDIANCE 10 MG TABS tablet TAKE 1 TABLET(10 MG) BY MOUTH DAILY 90 tablet 3    latanoprost (XALATAN) 0.005 % ophthalmic solution INSTILL 1 DROP INTO THE AFFECTED EYES ONCE DAILY AS DIRECTED. (Patient not taking: Reported on 2/20/2025) 5 mL 11    liraglutide (VICTOZA) 18 MG/3ML solution Inject 1.8 mg subcutaneously daily. 27 mL 3    omeprazole (PRILOSEC) 20 MG DR capsule TAKE 1 CAPSULE(20 MG) BY MOUTH DAILY 90 capsule 3    order for DME Equipment being ordered:     Massage tube, freeze and use to massage heel twice daily 1 Device 0    rosuvastatin (CRESTOR) 10 MG tablet TAKE 1 TABLET(10 MG) BY MOUTH DAILY 90 tablet 3    VITAMIN D3 25 MCG (1000 UT) tablet TAKE 1 TABLET BY MOUTH EVERY DAY 90 tablet 3     Allergies   Allergen Reactions    Lisinopril Cough              Review of Systems:              Physical Exam:     There were no vitals taken for this visit.  Estimated body mass index is 35.34 kg/m  as calculated from the following:    Height as of 4/22/25: 1.42 m (4' 7.91\").    Weight as of 4/22/25: 71.3 kg (157 lb 1.9 oz).  .bp      5/20/2025     4:15 PM 5/20/2025     4:43 PM 5/20/2025     4:53 PM   Vitals - Patient Reported   Height (Patient Reported) --     Weight (Patient Reported) --     Systolic (Patient Reported) -- 136 136   Diastolic (Patient Reported)  55 55           Constitutional: healthy, alert, and no distress  Psychiatric: mentation appears normal and affect normal/bright            Assessment and Plan   Omid was seen today for hypertension.    Diagnoses and all orders for this visit:    Essential hypertension  Well controlled based on home reading, no changes    CKD (chronic kidney disease) stage 4, GFR 15-29 ml/min (H)  -     sodium bicarbonate 650 MG tablet; Take 1 tablet (650 mg) by mouth 2 times daily.    Due for A1c in June.      Refilled medications that would be required in the next 3 months.     After Visit Information:  Patient declined AVS "     No follow-ups on file.    Appointment end time: 5:00 PM  This is a telephone visit that took 34 minutes.      Clinician location:  [facility]     Shannan Benson MD

## 2025-06-12 ENCOUNTER — RESULTS FOLLOW-UP (OUTPATIENT)
Dept: FAMILY MEDICINE | Facility: CLINIC | Age: 70
End: 2025-06-12

## 2025-07-01 DIAGNOSIS — Z79.4 TYPE 2 DIABETES MELLITUS WITH DIABETIC POLYNEUROPATHY, WITH LONG-TERM CURRENT USE OF INSULIN (H): ICD-10-CM

## 2025-07-01 DIAGNOSIS — E11.42 TYPE 2 DIABETES MELLITUS WITH DIABETIC POLYNEUROPATHY, WITH LONG-TERM CURRENT USE OF INSULIN (H): ICD-10-CM

## 2025-07-01 NOTE — TELEPHONE ENCOUNTER
Sig does not match. States to take 18-20 units 3x/day with meals on med list. Current Sig on request states inject 16-18 units with meals, 10 unit with late or small meals. Outside of RN standing orders. Routing to PCP for review and refill. Rex Adorno RN

## 2025-07-02 RX ORDER — INSULIN ASPART 100 [IU]/ML
18-20 INJECTION, SOLUTION INTRAVENOUS; SUBCUTANEOUS
Qty: 45 ML | Refills: 0 | Status: SHIPPED | OUTPATIENT
Start: 2025-07-02

## 2025-08-01 ENCOUNTER — TRANSFERRED RECORDS (OUTPATIENT)
Dept: HEALTH INFORMATION MANAGEMENT | Facility: CLINIC | Age: 70
End: 2025-08-01